# Patient Record
Sex: FEMALE | Race: WHITE | Employment: OTHER | URBAN - METROPOLITAN AREA
[De-identification: names, ages, dates, MRNs, and addresses within clinical notes are randomized per-mention and may not be internally consistent; named-entity substitution may affect disease eponyms.]

---

## 2017-01-06 ENCOUNTER — TELEPHONE (OUTPATIENT)
Dept: FAMILY MEDICINE CLINIC | Age: 77
End: 2017-01-06

## 2017-01-06 ENCOUNTER — OFFICE VISIT (OUTPATIENT)
Dept: FAMILY MEDICINE CLINIC | Age: 77
End: 2017-01-06

## 2017-01-06 VITALS
RESPIRATION RATE: 16 BRPM | HEIGHT: 66 IN | SYSTOLIC BLOOD PRESSURE: 124 MMHG | TEMPERATURE: 98 F | BODY MASS INDEX: 26.68 KG/M2 | DIASTOLIC BLOOD PRESSURE: 70 MMHG | WEIGHT: 166 LBS

## 2017-01-06 DIAGNOSIS — J01.90 ACUTE SINUSITIS, RECURRENCE NOT SPECIFIED, UNSPECIFIED LOCATION: ICD-10-CM

## 2017-01-06 DIAGNOSIS — J04.0 LARYNGITIS: ICD-10-CM

## 2017-01-06 DIAGNOSIS — E11.9 TYPE 2 DIABETES MELLITUS WITHOUT COMPLICATION, WITHOUT LONG-TERM CURRENT USE OF INSULIN (HCC): ICD-10-CM

## 2017-01-06 DIAGNOSIS — M17.12 PRIMARY OSTEOARTHRITIS OF LEFT KNEE: ICD-10-CM

## 2017-01-06 DIAGNOSIS — E11.9 TYPE 2 DIABETES MELLITUS WITHOUT COMPLICATION, WITHOUT LONG-TERM CURRENT USE OF INSULIN (HCC): Primary | ICD-10-CM

## 2017-01-06 LAB
ANION GAP SERPL CALCULATED.3IONS-SCNC: 18 MMOL/L (ref 3–16)
BUN BLDV-MCNC: 26 MG/DL (ref 7–20)
CALCIUM SERPL-MCNC: 10.2 MG/DL (ref 8.3–10.6)
CHLORIDE BLD-SCNC: 101 MMOL/L (ref 99–110)
CO2: 26 MMOL/L (ref 21–32)
CREAT SERPL-MCNC: 0.9 MG/DL (ref 0.6–1.2)
GFR AFRICAN AMERICAN: >60
GFR NON-AFRICAN AMERICAN: >60
GLUCOSE BLD-MCNC: 128 MG/DL (ref 70–99)
POTASSIUM SERPL-SCNC: 4.3 MMOL/L (ref 3.5–5.1)
SODIUM BLD-SCNC: 145 MMOL/L (ref 136–145)

## 2017-01-06 PROCEDURE — 99214 OFFICE O/P EST MOD 30 MIN: CPT | Performed by: FAMILY MEDICINE

## 2017-01-06 RX ORDER — PREDNISONE 20 MG/1
20 TABLET ORAL 2 TIMES DAILY
Qty: 10 TABLET | Refills: 0 | Status: SHIPPED | OUTPATIENT
Start: 2017-01-06 | End: 2017-01-11

## 2017-01-06 RX ORDER — AZITHROMYCIN 250 MG/1
TABLET, FILM COATED ORAL
Qty: 1 PACKET | Refills: 0 | Status: SHIPPED | OUTPATIENT
Start: 2017-01-06 | End: 2017-01-11

## 2017-01-07 LAB
ESTIMATED AVERAGE GLUCOSE: 151.3 MG/DL
HBA1C MFR BLD: 6.9 %

## 2017-01-11 RX ORDER — ATORVASTATIN CALCIUM 80 MG/1
80 TABLET, FILM COATED ORAL DAILY
Qty: 90 TABLET | Refills: 1 | Status: SHIPPED | OUTPATIENT
Start: 2017-01-11 | End: 2017-07-15 | Stop reason: SDUPTHER

## 2017-01-20 RX ORDER — METFORMIN HYDROCHLORIDE 500 MG/1
500 TABLET, EXTENDED RELEASE ORAL 2 TIMES DAILY
Qty: 180 TABLET | Refills: 0 | Status: SHIPPED | OUTPATIENT
Start: 2017-01-20 | End: 2017-04-14 | Stop reason: SDUPTHER

## 2017-02-22 ENCOUNTER — OFFICE VISIT (OUTPATIENT)
Dept: ENT CLINIC | Age: 77
End: 2017-02-22

## 2017-02-22 DIAGNOSIS — H91.90 HEARING LOSS, UNSPECIFIED LATERALITY: Primary | ICD-10-CM

## 2017-03-27 ENCOUNTER — PATIENT MESSAGE (OUTPATIENT)
Dept: FAMILY MEDICINE CLINIC | Age: 77
End: 2017-03-27

## 2017-03-28 ENCOUNTER — PATIENT MESSAGE (OUTPATIENT)
Dept: FAMILY MEDICINE CLINIC | Age: 77
End: 2017-03-28

## 2017-03-28 DIAGNOSIS — N39.3 STRESS INCONTINENCE IN FEMALE: ICD-10-CM

## 2017-03-28 RX ORDER — SOLIFENACIN SUCCINATE 10 MG/1
10 TABLET, FILM COATED ORAL DAILY
Qty: 90 TABLET | Refills: 3 | Status: SHIPPED | OUTPATIENT
Start: 2017-03-28 | End: 2017-07-15 | Stop reason: SDUPTHER

## 2017-04-05 ENCOUNTER — HOSPITAL ENCOUNTER (OUTPATIENT)
Dept: WOMENS IMAGING | Age: 77
Discharge: HOME | End: 2017-04-05
Attending: FAMILY MEDICINE | Admitting: FAMILY MEDICINE

## 2017-04-05 DIAGNOSIS — Z12.31 VISIT FOR SCREENING MAMMOGRAM: ICD-10-CM

## 2017-04-10 DIAGNOSIS — R92.8 ABNORMAL MAMMOGRAM OF LEFT BREAST: Primary | ICD-10-CM

## 2017-04-11 ENCOUNTER — HOSPITAL ENCOUNTER (OUTPATIENT)
Dept: ULTRASOUND IMAGING | Age: 77
Discharge: HOME | End: 2017-04-11
Attending: FAMILY MEDICINE | Admitting: FAMILY MEDICINE

## 2017-04-11 DIAGNOSIS — R92.8 OTHER ABNORMAL AND INCONCLUSIVE FINDINGS ON DIAGNOSTIC IMAGING OF BREAST: ICD-10-CM

## 2017-04-11 DIAGNOSIS — R92.8 ABNORMAL MAMMOGRAM: ICD-10-CM

## 2017-04-15 RX ORDER — METFORMIN HYDROCHLORIDE 500 MG/1
TABLET, EXTENDED RELEASE ORAL
Qty: 180 TABLET | Refills: 0 | Status: SHIPPED | OUTPATIENT
Start: 2017-04-15 | End: 2017-08-03 | Stop reason: SDUPTHER

## 2017-04-19 ENCOUNTER — OFFICE VISIT (OUTPATIENT)
Dept: PULMONOLOGY | Age: 77
End: 2017-04-19

## 2017-04-19 VITALS
TEMPERATURE: 98.3 F | SYSTOLIC BLOOD PRESSURE: 120 MMHG | HEART RATE: 63 BPM | BODY MASS INDEX: 26.52 KG/M2 | OXYGEN SATURATION: 97 % | WEIGHT: 165 LBS | HEIGHT: 66 IN | DIASTOLIC BLOOD PRESSURE: 70 MMHG | RESPIRATION RATE: 16 BRPM

## 2017-04-19 DIAGNOSIS — Z99.89 OSA ON CPAP: Primary | ICD-10-CM

## 2017-04-19 DIAGNOSIS — G47.33 OSA ON CPAP: Primary | ICD-10-CM

## 2017-04-19 PROCEDURE — 99214 OFFICE O/P EST MOD 30 MIN: CPT | Performed by: INTERNAL MEDICINE

## 2017-04-19 ASSESSMENT — SLEEP AND FATIGUE QUESTIONNAIRES
ESS TOTAL SCORE: 4
HOW LIKELY ARE YOU TO NOD OFF OR FALL ASLEEP WHILE SITTING INACTIVE IN A PUBLIC PLACE: 0
HOW LIKELY ARE YOU TO NOD OFF OR FALL ASLEEP WHILE WATCHING TV: 1
HOW LIKELY ARE YOU TO NOD OFF OR FALL ASLEEP IN A CAR, WHILE STOPPED FOR A FEW MINUTES IN TRAFFIC: 0
HOW LIKELY ARE YOU TO NOD OFF OR FALL ASLEEP WHILE LYING DOWN TO REST IN THE AFTERNOON WHEN CIRCUMSTANCES PERMIT: 1
HOW LIKELY ARE YOU TO NOD OFF OR FALL ASLEEP WHILE SITTING AND READING: 2
HOW LIKELY ARE YOU TO NOD OFF OR FALL ASLEEP WHILE SITTING AND TALKING TO SOMEONE: 0
HOW LIKELY ARE YOU TO NOD OFF OR FALL ASLEEP WHILE SITTING QUIETLY AFTER LUNCH WITHOUT ALCOHOL: 0
HOW LIKELY ARE YOU TO NOD OFF OR FALL ASLEEP WHEN YOU ARE A PASSENGER IN A CAR FOR AN HOUR WITHOUT A BREAK: 0

## 2017-05-17 ENCOUNTER — OFFICE VISIT (OUTPATIENT)
Dept: FAMILY MEDICINE CLINIC | Age: 77
End: 2017-05-17

## 2017-05-17 VITALS
HEART RATE: 54 BPM | DIASTOLIC BLOOD PRESSURE: 70 MMHG | BODY MASS INDEX: 26.36 KG/M2 | HEIGHT: 66 IN | TEMPERATURE: 98.6 F | WEIGHT: 164 LBS | SYSTOLIC BLOOD PRESSURE: 120 MMHG | RESPIRATION RATE: 16 BRPM

## 2017-05-17 DIAGNOSIS — N30.00 ACUTE CYSTITIS WITHOUT HEMATURIA: ICD-10-CM

## 2017-05-17 DIAGNOSIS — M17.12 PRIMARY OSTEOARTHRITIS OF LEFT KNEE: Primary | ICD-10-CM

## 2017-05-17 DIAGNOSIS — E11.9 TYPE 2 DIABETES MELLITUS WITHOUT COMPLICATION, WITHOUT LONG-TERM CURRENT USE OF INSULIN (HCC): ICD-10-CM

## 2017-05-17 LAB
BILIRUBIN, POC: NEGATIVE
BLOOD URINE, POC: NEGATIVE
CLARITY, POC: CLEAR
COLOR, POC: YELLOW
GLUCOSE URINE, POC: NEGATIVE
HBA1C MFR BLD: 6.4 %
KETONES, POC: ABNORMAL
LEUKOCYTE EST, POC: ABNORMAL
NITRITE, POC: NEGATIVE
PH, POC: 5.5
PROTEIN, POC: NEGATIVE
SPECIFIC GRAVITY, POC: 1.02
UROBILINOGEN, POC: ABNORMAL

## 2017-05-17 PROCEDURE — 81002 URINALYSIS NONAUTO W/O SCOPE: CPT | Performed by: FAMILY MEDICINE

## 2017-05-17 PROCEDURE — 99214 OFFICE O/P EST MOD 30 MIN: CPT | Performed by: FAMILY MEDICINE

## 2017-05-17 PROCEDURE — 83036 HEMOGLOBIN GLYCOSYLATED A1C: CPT | Performed by: FAMILY MEDICINE

## 2017-05-17 RX ORDER — SULFAMETHOXAZOLE AND TRIMETHOPRIM 800; 160 MG/1; MG/1
1 TABLET ORAL 2 TIMES DAILY
Qty: 10 TABLET | Refills: 0 | Status: SHIPPED | OUTPATIENT
Start: 2017-05-17 | End: 2017-05-22

## 2017-05-20 LAB
ORGANISM: ABNORMAL
URINE CULTURE, ROUTINE: ABNORMAL
URINE CULTURE, ROUTINE: ABNORMAL

## 2017-05-22 DIAGNOSIS — N30.00 ACUTE CYSTITIS WITHOUT HEMATURIA: Primary | ICD-10-CM

## 2017-05-22 RX ORDER — CIPROFLOXACIN 500 MG/1
500 TABLET, FILM COATED ORAL 2 TIMES DAILY
Qty: 20 TABLET | Refills: 0 | Status: SHIPPED | OUTPATIENT
Start: 2017-05-22 | End: 2017-06-01

## 2017-07-15 DIAGNOSIS — N39.3 STRESS INCONTINENCE IN FEMALE: ICD-10-CM

## 2017-07-15 RX ORDER — ATORVASTATIN CALCIUM 80 MG/1
80 TABLET, FILM COATED ORAL DAILY
Qty: 90 TABLET | Refills: 1 | Status: SHIPPED | OUTPATIENT
Start: 2017-07-15 | End: 2018-01-09 | Stop reason: SDUPTHER

## 2017-07-15 RX ORDER — SOLIFENACIN SUCCINATE 10 MG/1
10 TABLET, FILM COATED ORAL DAILY
Qty: 90 TABLET | Refills: 3 | Status: SHIPPED | OUTPATIENT
Start: 2017-07-15 | End: 2018-01-09 | Stop reason: SDUPTHER

## 2017-08-03 RX ORDER — METFORMIN HYDROCHLORIDE 500 MG/1
500 TABLET, EXTENDED RELEASE ORAL 2 TIMES DAILY
Qty: 180 TABLET | Refills: 0 | Status: SHIPPED | OUTPATIENT
Start: 2017-08-03 | End: 2017-10-30 | Stop reason: SDUPTHER

## 2017-08-23 ENCOUNTER — OFFICE VISIT (OUTPATIENT)
Dept: FAMILY MEDICINE CLINIC | Age: 77
End: 2017-08-23

## 2017-08-23 VITALS
TEMPERATURE: 98.2 F | BODY MASS INDEX: 25.07 KG/M2 | RESPIRATION RATE: 16 BRPM | SYSTOLIC BLOOD PRESSURE: 124 MMHG | HEART RATE: 68 BPM | WEIGHT: 156 LBS | HEIGHT: 66 IN | DIASTOLIC BLOOD PRESSURE: 78 MMHG

## 2017-08-23 DIAGNOSIS — E11.9 TYPE 2 DIABETES MELLITUS WITHOUT COMPLICATION, WITHOUT LONG-TERM CURRENT USE OF INSULIN (HCC): Primary | ICD-10-CM

## 2017-08-23 DIAGNOSIS — E78.2 MIXED HYPERLIPIDEMIA: ICD-10-CM

## 2017-08-23 LAB — HBA1C MFR BLD: 6.8 %

## 2017-08-23 PROCEDURE — 83036 HEMOGLOBIN GLYCOSYLATED A1C: CPT | Performed by: FAMILY MEDICINE

## 2017-08-23 PROCEDURE — 99213 OFFICE O/P EST LOW 20 MIN: CPT | Performed by: FAMILY MEDICINE

## 2017-08-23 ASSESSMENT — PATIENT HEALTH QUESTIONNAIRE - PHQ9
SUM OF ALL RESPONSES TO PHQ9 QUESTIONS 1 & 2: 0
1. LITTLE INTEREST OR PLEASURE IN DOING THINGS: 0
2. FEELING DOWN, DEPRESSED OR HOPELESS: 0
SUM OF ALL RESPONSES TO PHQ QUESTIONS 1-9: 0

## 2017-10-12 ENCOUNTER — TELEPHONE (OUTPATIENT)
Dept: PULMONOLOGY | Age: 77
End: 2017-10-12

## 2017-10-30 RX ORDER — METFORMIN HYDROCHLORIDE 500 MG/1
500 TABLET, EXTENDED RELEASE ORAL 2 TIMES DAILY
Qty: 180 TABLET | Refills: 0 | Status: SHIPPED | OUTPATIENT
Start: 2017-10-30 | End: 2018-01-29 | Stop reason: SDUPTHER

## 2017-10-30 NOTE — TELEPHONE ENCOUNTER
From: Lynn Abebe  Sent: 10/29/2017 1:26 PM EDT  Subject: Medication Renewal Request    [place] Anast[place].  Jose Byrd would like a refill of the following medications:  metFORMIN (GLUCOPHAGE-XR) 500 MG extended release tablet Ramirez Merlos MD]    Preferred pharmacy: [facility] [address], [address] [place] [address] [phone] - F [phone]    Comment:

## 2017-12-29 ENCOUNTER — OFFICE VISIT (OUTPATIENT)
Dept: FAMILY MEDICINE CLINIC | Age: 77
End: 2017-12-29

## 2017-12-29 VITALS
HEIGHT: 66 IN | RESPIRATION RATE: 16 BRPM | DIASTOLIC BLOOD PRESSURE: 70 MMHG | WEIGHT: 157 LBS | HEART RATE: 80 BPM | TEMPERATURE: 97.9 F | BODY MASS INDEX: 25.23 KG/M2 | SYSTOLIC BLOOD PRESSURE: 116 MMHG

## 2017-12-29 DIAGNOSIS — E78.2 MIXED HYPERLIPIDEMIA: ICD-10-CM

## 2017-12-29 DIAGNOSIS — Z00.00 MEDICARE ANNUAL WELLNESS VISIT, SUBSEQUENT: Primary | ICD-10-CM

## 2017-12-29 DIAGNOSIS — E11.9 TYPE 2 DIABETES MELLITUS WITHOUT COMPLICATION, WITHOUT LONG-TERM CURRENT USE OF INSULIN (HCC): ICD-10-CM

## 2017-12-29 DIAGNOSIS — M15.9 PRIMARY OSTEOARTHRITIS INVOLVING MULTIPLE JOINTS: ICD-10-CM

## 2017-12-29 LAB
A/G RATIO: 1.9 (ref 1.1–2.2)
ALBUMIN SERPL-MCNC: 4.2 G/DL (ref 3.4–5)
ALP BLD-CCNC: 112 U/L (ref 40–129)
ALT SERPL-CCNC: 22 U/L (ref 10–40)
ANION GAP SERPL CALCULATED.3IONS-SCNC: 13 MMOL/L (ref 3–16)
AST SERPL-CCNC: 18 U/L (ref 15–37)
BILIRUB SERPL-MCNC: 1.3 MG/DL (ref 0–1)
BUN BLDV-MCNC: 23 MG/DL (ref 7–20)
CALCIUM SERPL-MCNC: 9.4 MG/DL (ref 8.3–10.6)
CHLORIDE BLD-SCNC: 104 MMOL/L (ref 99–110)
CHOLESTEROL, TOTAL: 197 MG/DL (ref 0–199)
CO2: 26 MMOL/L (ref 21–32)
CREAT SERPL-MCNC: 0.7 MG/DL (ref 0.6–1.2)
GFR AFRICAN AMERICAN: >60
GFR NON-AFRICAN AMERICAN: >60
GLOBULIN: 2.2 G/DL
GLUCOSE BLD-MCNC: 156 MG/DL (ref 70–99)
HDLC SERPL-MCNC: 106 MG/DL (ref 40–60)
LDL CHOLESTEROL CALCULATED: 72 MG/DL
POTASSIUM SERPL-SCNC: 4.1 MMOL/L (ref 3.5–5.1)
SODIUM BLD-SCNC: 143 MMOL/L (ref 136–145)
TOTAL PROTEIN: 6.4 G/DL (ref 6.4–8.2)
TRIGL SERPL-MCNC: 93 MG/DL (ref 0–150)
VLDLC SERPL CALC-MCNC: 19 MG/DL

## 2017-12-29 PROCEDURE — G0439 PPPS, SUBSEQ VISIT: HCPCS | Performed by: FAMILY MEDICINE

## 2017-12-29 PROCEDURE — 99214 OFFICE O/P EST MOD 30 MIN: CPT | Performed by: FAMILY MEDICINE

## 2017-12-29 ASSESSMENT — ANXIETY QUESTIONNAIRES: GAD7 TOTAL SCORE: 0

## 2017-12-29 ASSESSMENT — LIFESTYLE VARIABLES
HOW MANY STANDARD DRINKS CONTAINING ALCOHOL DO YOU HAVE ON A TYPICAL DAY: 0
HAS A RELATIVE, FRIEND, DOCTOR, OR ANOTHER HEALTH PROFESSIONAL EXPRESSED CONCERN ABOUT YOUR DRINKING OR SUGGESTED YOU CUT DOWN: 0
HOW OFTEN DO YOU HAVE A DRINK CONTAINING ALCOHOL: 4
HOW OFTEN DO YOU HAVE SIX OR MORE DRINKS ON ONE OCCASION: 0
HOW OFTEN DURING THE LAST YEAR HAVE YOU NEEDED AN ALCOHOLIC DRINK FIRST THING IN THE MORNING TO GET YOURSELF GOING AFTER A NIGHT OF HEAVY DRINKING: 0
AUDIT TOTAL SCORE: 4
HOW OFTEN DURING THE LAST YEAR HAVE YOU FOUND THAT YOU WERE NOT ABLE TO STOP DRINKING ONCE YOU HAD STARTED: 0
HOW OFTEN DURING THE LAST YEAR HAVE YOU FAILED TO DO WHAT WAS NORMALLY EXPECTED FROM YOU BECAUSE OF DRINKING: 0
AUDIT-C TOTAL SCORE: 4
HOW OFTEN DURING THE LAST YEAR HAVE YOU BEEN UNABLE TO REMEMBER WHAT HAPPENED THE NIGHT BEFORE BECAUSE YOU HAD BEEN DRINKING: 0
HAVE YOU OR SOMEONE ELSE BEEN INJURED AS A RESULT OF YOUR DRINKING: 0
HOW OFTEN DURING THE LAST YEAR HAVE YOU HAD A FEELING OF GUILT OR REMORSE AFTER DRINKING: 0

## 2017-12-29 ASSESSMENT — PATIENT HEALTH QUESTIONNAIRE - PHQ9: SUM OF ALL RESPONSES TO PHQ QUESTIONS 1-9: 0

## 2017-12-29 NOTE — PROGRESS NOTES
Medicare Annual Wellness Visit  Name: Elizabeth Santo  YOB: 1940  Age: 65 y.o. Sex: female  MRN: [id number]     Date of Service:  12/29/2017    Patient Active Problem List   Diagnosis    Diabetes mellitus, type II ([address]    Hyperlipidemia    Insomnia    Osteopenia- DXA nl 6/12    Ganglion of joint    Stress incontinence in female    Osteoarthritis    Allergic rhinitis    Spondylosis    Sleep apnea-CPAP    Colon cancer screening- neg colon 2/2010    Screening mammogram, encounter for    Screening for cardiovascular condition- 5/11 mild left carotid art dis, nl TOPHER    Elbow pain, left    Hip pain, right    Lumbar disc disease with L 5 radiculopathy    Retrolisthesis L5-S1    GERD (gastroesophageal reflux disease)    Medicare annual wellness visit, subsequent    Hematoma dorsal right foot 8/2013    Neuropathy right ankle S/P surgery    Tear of meniscus of knee    Chondromalacia patella, left    Nephrolithiasis    Lumbosacral radiculopathy at L5 right    S/P lumbar spinal fusion    Actinic dermatitis    Primary osteoarthritis of left knee     Health Maintenance   Topic Date Due    Diabetic foot exam  09/22/2017    Lipid screen  09/22/2017    Diabetic hemoglobin A1C test  02/23/2018    Diabetic retinal exam  12/21/2018    DTaP/Tdap/Td vaccine (3 - Td) 06/11/2026    Zostavax vaccine  Addressed    DEXA (modify frequency per FRAX score)  Completed    Flu vaccine  Completed    Pneumococcal low/med risk  Completed      Chief Complaint:   Elizabeth Santo is a 65 y.o. female who presents for Medicare Annual Wellness Visit and check-up for:       Type 2 diabetes mellitus without complication, without long-term current use of insulin (HCC)     Mixed hyperlipidemia     Primary osteoarthritis involving multiple joints      HPI  Had spine injections 2 weeks ago, ?  Help but going [address] ROS: negative for - fever or night sweats  Ophthalmic ROS: negative for - eye pain or loss of vision  ENT ROS: negative for - hearing change or sore throat  Hematological and Lymphatic ROS: negative for - bruising or weight loss  Endocrine ROS: negative for - malaise/lethargy or unexpected weight changes  Respiratory ROS: no cough, shortness of breath, or wheezing  Cardiovascular ROS: no chest pain or dyspnea on exertion  Gastrointestinal ROS: no abdominal pain, change in bowel habits, or black or bloody stools  Genito-Urinary ROS: no dysuria, trouble voiding, or hematuria  Dermatological ROS: negative for - rash or skin lesion changes    Screenings for behavioral, psychosocial and functional/safety risks, and cognitive dysfunction are all negative except as indicated below. These results, as well as other patient data from the [address] form, are documented in Flowsheets linked to this Encounter. Allergies   Allergen Reactions    Simvastatin       Prior to Visit Medications    Medication Sig Taking? Authorizing Provider   metFORMIN (GLUCOPHAGE-XR) 500 MG extended release tablet Take 1 tablet by mouth 2 times daily Yes Lily Reagan MD   atorvastatin (LIPITOR) 80 MG tablet Take 1 tablet by mouth daily Yes Valarie Cleary MD   solifenacin (VESICARE) 10 MG tablet Take 1 tablet by mouth daily Yes Valarie Cleary MD   naproxen sodium (ALEVE) 220 MG tablet Take 550 mg by mouth 2 times daily (with meals) Yes Historical Provider, MD   CPAP Machine MISC by Does not apply route Yes Historical Provider, MD   glucose blood VI test strips (ONE TOUCH ULTRA TEST) strip Daily. DM 2   250.00 Yes Valarie Cleary MD   aspirin 81 MG EC tablet Take 81 mg by mouth daily. Yes Historical Provider, MD   solifenacin (VESICARE) 10 MG tablet Take 1 tablet by mouth daily.   Valarie Cleary MD     Past Medical History:   Diagnosis Date    Cataract     Chronic kidney disease     Chronic SI joint pain     Complication of anesthesia     oversedation    Depression · Home safety tips provided  Depression Interventions:  · None indicated  Anxiety Interventions:  · None indicated  Cognitive Impairment Interventions:  · None indicated     Substance Abuse Interventions:  · None indicated  Social History     Social History    Marital status:      Spouse name: Taj Fishman Number of children: N/A    Years of education: N/A     Social History Main Topics    Smoking status: Never Smoker    Smokeless tobacco: Never Used      Comment: Advised not to start.  Alcohol use 0.0 oz/week      Comment: light    Drug use: No    Sexual activity: Yes     Partners: Male      Comment:      Other Topics Concern    None     Social History Narrative    Self-breast exams: yes. Exercise: [employer] sometimes, walk sometimes. Seatbelt use: Always. Living will: yes, copy requested.        Health Risk Assessment:   Please see Screenings under MA note  General Health Risk Interventions:  · None indicated     Wt Readings from Last 5 Encounters:   12/29/17 157 lb (71.2 kg)   08/23/17 156 lb (70.8 kg)   05/17/17 164 lb (74.4 kg)   04/19/17 165 lb (74.8 kg)   01/06/17 166 lb (75.3 kg)      Body mass index is 25.34 kg/m².    Health Habits/Nutrition Interventions:  · Inadequate physical activity:  patient agrees to exercise for at least 150 minutes/week     Hearing/Vision Interventions:  · Hearing concerns:  new hearing aides on way     Safety Interventions:  · Home safety tips provided     ADL Interventions:  · None indicated    Personalized Preventive Plan   Current Health Maintenance Status  Immunization History   Administered Date(s) Administered    Influenza Virus Vaccine 01/28/2010    Influenza, High Dose 10/28/2014, 10/19/2015, 08/22/2016, 10/30/2017    Pneumococcal 13-valent Conjugate ([id number]) 10/19/2015    Pneumococcal Conjugate 7-valent 09/13/2007    Pneumococcal Polysaccharide ([id number]) 09/13/2007    Tdap (Boostrix, Adacel) 11/17/2005, 06/11/2016     Health Maintenance   Topic Date Due    Diabetic foot exam  09/22/2017    Lipid screen  09/22/2017    Diabetic hemoglobin A1C test  02/23/2018    Diabetic retinal exam  12/21/2018    DTaP/Tdap/Td vaccine (3 - Td) 06/11/2026    Zostavax vaccine  Addressed    DEXA (modify frequency per FRAX score)  Completed    Flu vaccine  Completed    Pneumococcal low/med risk  Completed     Recommendations for Preventive Services Due: see orders.   Recommended screening schedule for the next 5-10 years is provided to the patient in written form: see Patient Instructions/AVS.    LAST LABS   Lab Results   Component Value Date    GLUCOSE 128 (H) 01/06/2017     Lab Results   Component Value Date     01/06/2017    K 4.3 01/06/2017    CREATININE 0.9 01/06/2017     Cholesterol, Total   Date Value Ref Range Status   09/22/2016 193 0 - 199 mg/dL Final     LDL Calculated   Date Value Ref Range Status   09/22/2016 83 <100 mg/dL Final     HDL   Date Value Ref Range Status   09/22/2016 96 (H) 40 - 60 mg/dL Final   10/19/2011 72 (H) 40 - 60 mg/dl Final     Triglycerides   Date Value Ref Range Status   09/22/2016 69 0 - 150 mg/dL Final     Lab Results   Component Value Date    ALT 9 (L) 09/22/2016    AST 12 (L) 09/22/2016    ALKPHOS 102 09/22/2016    BILITOT 0.7 09/22/2016      Lab Results   Component Value Date    WBC 5.7 10/16/2015    HGB 12.0 10/16/2015    HCT 36.3 10/16/2015    MCV 92.7 10/16/2015     10/16/2015     TSH (uIU/ml)   Date Value   01/30/2012 1.13     Lab Results   Component Value Date    LABA1C 6.8 08/23/2017     The 10-year ASCVD risk score (Anny Calero et al., 2013) is: 34.7%    Values used to calculate the score:      Age: 65 years      Sex: Female      Is Non- : No      Diabetic: Yes      Tobacco smoker: No      Systolic Blood Pressure: 049 mmHg      Is BP treated: No      HDL Cholesterol: 96 mg/dL      Total Cholesterol: 193 mg/dL  PHYSICAL EXAM:  VITALS:  Blood pressure is Excellent. BP Readings from Last 5 Encounters:   12/29/17 116/70   08/23/17 124/78   05/17/17 120/70   04/19/17 120/70   01/06/17 124/70     Weight is unchanged. Wt Readings from Last 5 Encounters:   12/29/17 157 lb (71.2 kg)   08/23/17 156 lb (70.8 kg)   05/17/17 164 lb (74.4 kg)   04/19/17 165 lb (74.8 kg)   01/06/17 166 lb (75.3 kg)      Vitals:    12/29/17 0924   BP: 116/70   Site: Right Arm   Position: Sitting   Cuff Size: Large Adult   Pulse: 80   Resp: 16   Temp: 97.9 °F (36.6 °C)   TempSrc: Oral   Weight: 157 lb (71.2 kg)   Height: 5' 6\" (1.676 m)     Body mass index is 25.34 kg/m². GENERAL: well-developed, well-nourished, alert, no distress, calm   EYES: negative findings: lids and lashes normal and conjunctivae and sclerae normal  ENT: normal TM's and external ear canals both ears  · External nose and ears appear normal  · Pharynx: normal. Exudates: None  · Lips, mucosa, and tongue normal and teeth normal  · Hearing grossly normal.     NECK: No adenopathy, supple, symmetrical, trachea midline  · Thyroid not enlarged, symmetric, no tenderness/mass/nodules  · no cervical nodes, no supraclavicular nodes  LUNGS:  Breathing unlabored  · clear to auscultation bilaterally and good air movement  CARDIOVASC: regular rate and rhythm, S1, S2 normal, no murmur, click, rub or gallop  LEGS:  Lower extremity edema: none    · No carotid bruits  ABDOMEN: Soft, non-tender, no masses  · No hepatosplenomegaly  · No hernias noted. Exam limited by N/A  SKIN: warm and dry  · No rashes or suspicious lesions  PSYCH:  Alert and oriented  · Normal reasoning, insight good  · Facial expressions full, mood appropriate  · No memory disturbance noted  MUSCULOSKEL:  No significant finger or nail findings  · Spine symmetric, no deformities, no kyphosis   GAIT: UP and Go test: <30 seconds with gait: normal.  Speed Normal.  No significant balance checks. No extra steps on turn around.  Assistive device: none      Assessment and Plan: 1. Medicare annual wellness visit, subsequent     2. Type 2 diabetes mellitus without complication, without long-term current use of insulin (HCC)  well controlled, no significant medication side effects noted. Medication: no change. A1c today. Hemoglobin A1C    Comprehensive Metabolic Panel     DIABETES FOOT EXAM   3. Mixed hyperlipidemia  Last test showed control to be well controlled. No significant medication side effects noted. Continue current therapy. Labs as below. Lipid Panel    Comprehensive Metabolic Panel   4. Primary osteoarthritis involving multiple joints  Stable with current medications. Diagnosis Risk: Moderate Risk    FYI: While Medicare provides you with a FREE ANNUAL PREVENTIVE PHYSICAL, this visit does NOT include management of chronic medical problems or physical examination. Dr. Yoel Webber usually does a combination visit if you have other medical problems so you don't have to come back for another visit. However, this means that there will be a co-pay. INSTRUCTIONS  NEXT APPOINTMENT: Please schedule check-up in 3 months. PLEASE GET BLOODWORK DRAWN TODAY ON FIRST FLOOR in 170. Take orders with you. RESULTS- most blood tests back in couple days. We will call you if any problems. If bloodwork good, you will get letter in mail or notified thru [address] (if signed up) within 2 weeks. If you do not, please call office.      Personalized Preventive Plan  Health Maintenance Due   Topic Date Due    Diabetic foot exam  09/22/2017    Lipid screen  09/22/2017     Other Preventive Recommendations:  · A preventive eye exam performed by an eye specialist is recommended every 1-2 years to screen for glaucoma; cataracts, macular degeneration, and other eye disorders  · A preventive dental visit is recommended every 6 months  · Try to get at least 150 minutes of exercise per week or 10,000 steps per day on a pedometer  · Order FREE \"Exercise and Physical Activity\" book from the [facility]

## 2017-12-29 NOTE — PATIENT INSTRUCTIONS
FYI: While Medicare provides you with a FREE ANNUAL PREVENTIVE PHYSICAL, this visit does NOT include management of chronic medical problems or physical examination. Dr. Jillian Tran usually does a combination visit if you have other medical problems so you don't have to come back for another visit. However, this means that there will be a co-pay. INSTRUCTIONS  NEXT APPOINTMENT: Please schedule check-up in 3 months. PLEASE GET BLOODWORK DRAWN TODAY ON FIRST FLOOR in 170. Take orders with you. RESULTS- most blood tests back in couple days. We will call you if any problems. If bloodwork good, you will get letter in mail or notified thru [address] (if signed up) within 2 weeks. If you do not, please call office.      Patient Education       Personalized Preventive Plan  Health Maintenance Due   Topic Date Due    Diabetic foot exam  2017    Lipid screen  2017     Other Preventive Recommendations:  · A preventive eye exam performed by an eye specialist is recommended every 1-2 years to screen for glaucoma; cataracts, macular degeneration, and other eye disorders  · A preventive dental visit is recommended every 6 months  · Try to get at least 150 minutes of exercise per week or 10,000 steps per day on a pedometer  · Order FREE \"Exercise and Physical Activity\" book from the [facility] on Agin1-709.103.4421 or https://order.julisa.nih.gov/health/publication/order/  · You need 8936-6680 mg of calcium and 6951-4665 IU of vitamin D per day- it is possible to meet your calcium requirement with diet alone, but a vitamin D supplement is usually necessary to meet this goal  · When exposed to the sun, use a sunscreen that protects against both UVA and UVB radiation with an SPF of 30 or greater- reapply every 2 to 3 hours or after sweating, drying off with a towel, or swimming  · Always wear a seat belt when traveling in a car, and a helmet when riding a bicycle or motorcycle    EXERCISE AND again.    When should I call my doctor? If your muscles or joints are sore the day after exercising, you may have done too much. Next time, exercise at a lower intensity. If the pain or discomfort persists, you should talk to your doctor. You should also talk to your doctor if you have any of the following symptoms while exercising:  Chest pain or pressure   Trouble breathing or excessive shortness of breath   Light-headedness or dizziness   Difficulty with balance   Nausea    What are some specific exercises I can do? The following page shows some simple strength exercises that you can do at home. Each exercise should be done 8 to 10 times for 2 sets. Remember to:  Complete all movements in a slow, controlled fashion. Don't hold your breath. Stop if you feel pain. Stretch each muscle after your workout. Disclaimer: The drawings and animations of exercises displayed below are provided only to illustrate the exercises     Wall Pushups  Place hands flat against the wall. Slowly lower body to the wall. Push body away from wall to return to starting position. Chair Squats  Begin by sitting in the chair. Lean slightly forward and stand up from the chair. Try not to favor one side or use your hands to help you. Bicep Curl  Hold a weight in each hand with your arms at your sides. Bending your arms at the elbows, lift the weights to your shoulders and then lower them to your sides. Shoulder Shrugs  Hold a weight in each hand with your arms at your side. Shrug your shoulders up toward your ears and then lower them back down. FALLS:  HOW TO LOWER YOUR RISK     Who is at high risk of falling? Anyone can fall, although the risk is higher in older people. This increased risk of falling may be the result of changes that come with aging, and certain medical conditions, such as arthritis, cataracts or hip problems. What can I do to lower my risk of falling?   Most falls happen in the of the bed for a few minutes before standing up. Your blood pressure takes some time to adjust when you sit up. It may be too low if you get up quickly. This can make you dizzy, and you might lose your balance and fall. Home Safety: How Well Does Your Home Meet Your Needs? Steps/Stairways/Walkways YesNo    Are they in good shape? Do they have a smooth, safe surface? Are there handrails on both sides of the stairway? How about light switches at the top and bottom of the stairs? Is there grasping space for both knuckles and fingers on railings? Are the stair treads deep enough for your whole foot? Would a ramp be feasible in any of these areas if it became necessary? Floor Surfaces YesNo    Is the surface safe? [place]? Any throw rugs or doormats that might slip underfoot? Is carpeting loose or torn? Are there changes in floor levels? If so, are they obvious or well marked? Do you have to step over any electric, telephone, or extension cords? Carolyne and Beth Quezada    Is there always space to park? Is it convenient to the entrance? Does the garage door open automatically? Windows & Doors [place]    Are windows and doors easy to open and close? Are locks sturdy and easy to operate? Do doorways accommodate a walker or wheelchair? Can you walk through the doorways easily? Is there space to maneuver while opening and closing doors? Does the front door have a view panel or peephole at the right height? Appliances/Kitchen/Bath [place]    Is the room arranged safely and conveniently? Do the oven and fridge open easily? Are stove controls clearly marked and easy to use? Is the counter the right height and depth? Can you work sitting down? Are cabinet doorknobs easy to use? Are faucets easy to use? Do you have a hand-held shower head? Are the items you use often on high shelves? Do you have a step stool with handles?     Can you

## 2017-12-30 LAB
ESTIMATED AVERAGE GLUCOSE: 180 MG/DL
HBA1C MFR BLD: 7.9 %

## 2018-01-09 DIAGNOSIS — N39.3 STRESS INCONTINENCE IN FEMALE: ICD-10-CM

## 2018-01-10 RX ORDER — ATORVASTATIN CALCIUM 80 MG/1
80 TABLET, FILM COATED ORAL DAILY
Qty: 90 TABLET | Refills: 1 | Status: SHIPPED | OUTPATIENT
Start: 2018-01-10 | End: 2018-07-16 | Stop reason: SDUPTHER

## 2018-01-10 RX ORDER — SOLIFENACIN SUCCINATE 10 MG/1
10 TABLET, FILM COATED ORAL DAILY
Qty: 90 TABLET | Refills: 3 | Status: SHIPPED | OUTPATIENT
Start: 2018-01-10 | End: 2018-12-10

## 2018-01-29 RX ORDER — METFORMIN HYDROCHLORIDE 500 MG/1
500 TABLET, EXTENDED RELEASE ORAL 2 TIMES DAILY
Qty: 180 TABLET | Refills: 0 | Status: SHIPPED | OUTPATIENT
Start: 2018-01-29 | End: 2018-05-07 | Stop reason: SDUPTHER

## 2018-01-29 NOTE — TELEPHONE ENCOUNTER
From: Fransisco Martin  Sent: 1/29/2018 10:13 AM EST  Subject: Medication Renewal Request    Cheri Fine.  Phyllis Prasad would like a refill of the following medications:  metFORMIN (GLUCOPHAGE-XR) 500 MG extended release tablet Rogelio Reagan MD]    Preferred pharmacy: [facility] [address], [address] [place] [address] -  [phone]    Comment:

## 2018-03-05 ENCOUNTER — PATIENT MESSAGE (OUTPATIENT)
Dept: FAMILY MEDICINE CLINIC | Age: 78
End: 2018-03-05

## 2018-03-13 ENCOUNTER — TELEPHONE (OUTPATIENT)
Dept: FAMILY MEDICINE CLINIC | Age: 78
End: 2018-03-13

## 2018-03-13 DIAGNOSIS — L82.0 INFLAMED SEBORRHEIC KERATOSIS: ICD-10-CM

## 2018-03-13 DIAGNOSIS — D23.5 DYSPLASTIC NEVUS OF TRUNK: Primary | ICD-10-CM

## 2018-03-14 PROBLEM — L82.0 INFLAMED SEBORRHEIC KERATOSIS: Status: ACTIVE | Noted: 2018-03-14

## 2018-03-14 PROBLEM — D23.5 DYSPLASTIC NEVUS OF TRUNK: Status: ACTIVE | Noted: 2018-03-14

## 2018-05-03 ENCOUNTER — TELEPHONE (OUTPATIENT)
Dept: FAMILY MEDICINE CLINIC | Age: 78
End: 2018-05-03

## 2018-05-03 DIAGNOSIS — E11.9 TYPE 2 DIABETES MELLITUS WITHOUT COMPLICATION, WITHOUT LONG-TERM CURRENT USE OF INSULIN (HCC): Primary | ICD-10-CM

## 2018-05-03 RX ORDER — MELOXICAM 15 MG/1
15 TABLET ORAL DAILY
COMMUNITY
Start: 2018-04-11 | End: 2018-08-22 | Stop reason: ALTCHOICE

## 2018-05-03 RX ORDER — FERROUS SULFATE 325(65) MG
325 TABLET ORAL
COMMUNITY
End: 2019-03-07

## 2018-05-04 ENCOUNTER — HOSPITAL ENCOUNTER (OUTPATIENT)
Dept: WOMENS IMAGING | Age: 78
Discharge: HOME | End: 2018-05-04
Attending: FAMILY MEDICINE | Admitting: FAMILY MEDICINE

## 2018-05-04 DIAGNOSIS — Z12.31 VISIT FOR SCREENING MAMMOGRAM: ICD-10-CM

## 2018-05-07 ENCOUNTER — OFFICE VISIT (OUTPATIENT)
Dept: FAMILY MEDICINE CLINIC | Age: 78
End: 2018-05-07

## 2018-05-07 VITALS
RESPIRATION RATE: 16 BRPM | HEIGHT: 66 IN | HEART RATE: 68 BPM | SYSTOLIC BLOOD PRESSURE: 120 MMHG | DIASTOLIC BLOOD PRESSURE: 70 MMHG | WEIGHT: 163 LBS | TEMPERATURE: 98.2 F | BODY MASS INDEX: 26.2 KG/M2

## 2018-05-07 DIAGNOSIS — N39.3 STRESS INCONTINENCE IN FEMALE: ICD-10-CM

## 2018-05-07 DIAGNOSIS — E11.9 TYPE 2 DIABETES MELLITUS WITHOUT COMPLICATION, WITHOUT LONG-TERM CURRENT USE OF INSULIN (HCC): Primary | ICD-10-CM

## 2018-05-07 DIAGNOSIS — E78.2 MIXED HYPERLIPIDEMIA: ICD-10-CM

## 2018-05-07 LAB — HBA1C MFR BLD: 7.2 %

## 2018-05-07 PROCEDURE — 83036 HEMOGLOBIN GLYCOSYLATED A1C: CPT | Performed by: FAMILY MEDICINE

## 2018-05-07 PROCEDURE — 99214 OFFICE O/P EST MOD 30 MIN: CPT | Performed by: FAMILY MEDICINE

## 2018-05-07 RX ORDER — METFORMIN HYDROCHLORIDE 500 MG/1
500 TABLET, EXTENDED RELEASE ORAL 2 TIMES DAILY
Qty: 180 TABLET | Refills: 0 | Status: SHIPPED | OUTPATIENT
Start: 2018-05-07 | End: 2018-05-07 | Stop reason: SDUPTHER

## 2018-05-07 RX ORDER — METFORMIN HYDROCHLORIDE 500 MG/1
500 TABLET, EXTENDED RELEASE ORAL 2 TIMES DAILY
Qty: 180 TABLET | Refills: 0 | Status: SHIPPED | OUTPATIENT
Start: 2018-05-07 | End: 2018-07-16 | Stop reason: SDUPTHER

## 2018-07-16 DIAGNOSIS — E11.9 TYPE 2 DIABETES MELLITUS WITHOUT COMPLICATION, WITHOUT LONG-TERM CURRENT USE OF INSULIN (HCC): ICD-10-CM

## 2018-07-17 RX ORDER — ATORVASTATIN CALCIUM 80 MG/1
80 TABLET, FILM COATED ORAL DAILY
Qty: 90 TABLET | Refills: 1 | Status: SHIPPED | OUTPATIENT
Start: 2018-07-17 | End: 2019-01-08 | Stop reason: SDUPTHER

## 2018-07-17 RX ORDER — METFORMIN HYDROCHLORIDE 500 MG/1
500 TABLET, EXTENDED RELEASE ORAL 2 TIMES DAILY
Qty: 180 TABLET | Refills: 0 | Status: SHIPPED | OUTPATIENT
Start: 2018-07-17 | End: 2018-11-01 | Stop reason: SDUPTHER

## 2018-08-22 ENCOUNTER — OFFICE VISIT (OUTPATIENT)
Dept: PULMONOLOGY | Age: 78
End: 2018-08-22

## 2018-08-22 VITALS
DIASTOLIC BLOOD PRESSURE: 68 MMHG | WEIGHT: 167 LBS | RESPIRATION RATE: 16 BRPM | HEART RATE: 74 BPM | TEMPERATURE: 98 F | HEIGHT: 66 IN | BODY MASS INDEX: 26.84 KG/M2 | SYSTOLIC BLOOD PRESSURE: 150 MMHG | OXYGEN SATURATION: 98 %

## 2018-08-22 DIAGNOSIS — G47.33 OSA ON CPAP: Primary | ICD-10-CM

## 2018-08-22 DIAGNOSIS — Z99.89 OSA ON CPAP: Primary | ICD-10-CM

## 2018-08-22 PROCEDURE — 99213 OFFICE O/P EST LOW 20 MIN: CPT | Performed by: INTERNAL MEDICINE

## 2018-08-22 ASSESSMENT — SLEEP AND FATIGUE QUESTIONNAIRES
HOW LIKELY ARE YOU TO NOD OFF OR FALL ASLEEP WHILE SITTING AND TALKING TO SOMEONE: 0
HOW LIKELY ARE YOU TO NOD OFF OR FALL ASLEEP WHILE LYING DOWN TO REST IN THE AFTERNOON WHEN CIRCUMSTANCES PERMIT: 1
ESS TOTAL SCORE: 4
HOW LIKELY ARE YOU TO NOD OFF OR FALL ASLEEP WHILE SITTING QUIETLY AFTER LUNCH WITHOUT ALCOHOL: 0
HOW LIKELY ARE YOU TO NOD OFF OR FALL ASLEEP WHEN YOU ARE A PASSENGER IN A CAR FOR AN HOUR WITHOUT A BREAK: 0
HOW LIKELY ARE YOU TO NOD OFF OR FALL ASLEEP WHILE SITTING AND READING: 2
HOW LIKELY ARE YOU TO NOD OFF OR FALL ASLEEP WHILE WATCHING TV: 1
HOW LIKELY ARE YOU TO NOD OFF OR FALL ASLEEP WHILE SITTING INACTIVE IN A PUBLIC PLACE: 0
HOW LIKELY ARE YOU TO NOD OFF OR FALL ASLEEP IN A CAR, WHILE STOPPED FOR A FEW MINUTES IN TRAFFIC: 0
NECK CIRCUMFERENCE (INCHES): 13.75

## 2018-08-24 ENCOUNTER — OFFICE VISIT (OUTPATIENT)
Dept: FAMILY MEDICINE CLINIC | Age: 78
End: 2018-08-24

## 2018-08-24 VITALS
SYSTOLIC BLOOD PRESSURE: 152 MMHG | BODY MASS INDEX: 26.52 KG/M2 | HEART RATE: 68 BPM | WEIGHT: 165 LBS | RESPIRATION RATE: 16 BRPM | TEMPERATURE: 98.1 F | DIASTOLIC BLOOD PRESSURE: 70 MMHG | HEIGHT: 66 IN

## 2018-08-24 DIAGNOSIS — R03.0 ELEVATED BP WITHOUT DIAGNOSIS OF HYPERTENSION: ICD-10-CM

## 2018-08-24 DIAGNOSIS — Z23 NEED FOR SHINGLES VACCINE: ICD-10-CM

## 2018-08-24 DIAGNOSIS — E11.9 TYPE 2 DIABETES MELLITUS WITHOUT COMPLICATION, WITHOUT LONG-TERM CURRENT USE OF INSULIN (HCC): ICD-10-CM

## 2018-08-24 DIAGNOSIS — J30.9 ALLERGIC RHINITIS, UNSPECIFIED SEASONALITY, UNSPECIFIED TRIGGER: ICD-10-CM

## 2018-08-24 DIAGNOSIS — M15.9 PRIMARY OSTEOARTHRITIS INVOLVING MULTIPLE JOINTS: ICD-10-CM

## 2018-08-24 DIAGNOSIS — G47.33 OBSTRUCTIVE SLEEP APNEA SYNDROME: ICD-10-CM

## 2018-08-24 DIAGNOSIS — K21.9 GASTROESOPHAGEAL REFLUX DISEASE WITHOUT ESOPHAGITIS: ICD-10-CM

## 2018-08-24 DIAGNOSIS — E11.9 TYPE 2 DIABETES MELLITUS WITHOUT COMPLICATION, WITHOUT LONG-TERM CURRENT USE OF INSULIN (HCC): Primary | ICD-10-CM

## 2018-08-24 DIAGNOSIS — E78.2 MIXED HYPERLIPIDEMIA: ICD-10-CM

## 2018-08-24 LAB
ANION GAP SERPL CALCULATED.3IONS-SCNC: 17 MMOL/L (ref 3–16)
BUN BLDV-MCNC: 15 MG/DL (ref 7–20)
CALCIUM SERPL-MCNC: 10 MG/DL (ref 8.3–10.6)
CHLORIDE BLD-SCNC: 103 MMOL/L (ref 99–110)
CO2: 23 MMOL/L (ref 21–32)
CREAT SERPL-MCNC: 0.7 MG/DL (ref 0.6–1.2)
GFR AFRICAN AMERICAN: >60
GFR NON-AFRICAN AMERICAN: >60
GLUCOSE BLD-MCNC: 160 MG/DL (ref 70–99)
HBA1C MFR BLD: 6.8 %
POTASSIUM SERPL-SCNC: 4.6 MMOL/L (ref 3.5–5.1)
SODIUM BLD-SCNC: 143 MMOL/L (ref 136–145)

## 2018-08-24 PROCEDURE — 83036 HEMOGLOBIN GLYCOSYLATED A1C: CPT | Performed by: FAMILY MEDICINE

## 2018-08-24 PROCEDURE — 99214 OFFICE O/P EST MOD 30 MIN: CPT | Performed by: FAMILY MEDICINE

## 2018-08-24 NOTE — PATIENT INSTRUCTIONS
MA: Please recheck blood pressure. INSTRUCTIONS  · NEXT APPOINTMENT:Please schedule check-up in 3 months. · PLEASE TAKE THIS FORM TO CHECK-OUT WINDOW TO SCHEDULE NEXT VISIT. · PLEASE GET BLOODWORK DRAWN TODAY ON FIRST FLOOR in 170. Take orders with you. RESULTS- most blood tests back in couple days. We will call you if any problems. If bloodwork good, you will get letter in mail or notified thru [address] (if signed up) within 2 weeks. If you do not, please call office. · Please get flu vaccine when available in fall. Can get either at this office or at stores such as [email or web address]. · Please get annual dilated eye exam to screen for diabetic retinopathy which can lead to vision loss. Ask for report to be faxed to [phone]. · Medicare part D patients:  Please take Rx for Shingrix to pharmacy (such as [facility] or [facility]) to get shingles vaccine. Need second dose in 2-6 months. · Please drop in to see medical assistant to repeat blood pressure in 2 weeks. May need to start lisinopril HCT  10/12. 5.

## 2018-08-24 NOTE — PROGRESS NOTES
current medications. POCT glycosylated hemoglobin (Hb A1C)    Basic Metabolic Panel   3. Mixed hyperlipidemia  Stable with current medications. 4. Need for shingles vaccine  zoster recombinant adjuvanted vaccine (SHINGRIX) 50 MCG SUSR injection   5. Obstructive sleep apnea syndrome  Good with CPAP   6. Primary osteoarthritis involving multiple joints  stable   7. Allergic rhinitis, unspecified seasonality, unspecified trigger  syable   8. Gastroesophageal reflux disease without esophagitis  Stable with current medications. 9. Elevated BP without diagnosis of hypertension  NEW  Basic Metabolic Panel   Previous labs reviewed as above.     MA: Please recheck blood pressure. INSTRUCTIONS  NEXT APPOINTMENT: Please schedule check-up in 3 months. · PLEASE TAKE THIS FORM TO CHECK-OUT WINDOW TO SCHEDULE NEXT VISIT. · PLEASE GET BLOODWORK DRAWN TODAY ON FIRST FLOOR in 170. Take orders with you. RESULTS- most blood tests back in couple days. We will call you if any problems. If bloodwork good, you will get letter in mail or notified thru [address] (if signed up) within 2 weeks. If you do not, please call office. · Please get flu vaccine when available in fall. Can get either at this office or at stores such as [employer]. · Please get annual dilated eye exam to screen for diabetic retinopathy which can lead to vision loss. Ask for report to be faxed to [phone]. · Medicare part D patients:  Please take Rx for Shingrix to pharmacy (such as [facility] or [facility]) to get shingles vaccine. Need second dose in 2-6 months. · Please drop in to see medical assistant to repeat blood pressure in 2 weeks. May need to start lisinopril HCT  10/12. 5.

## 2018-09-07 ENCOUNTER — NURSE ONLY (OUTPATIENT)
Dept: FAMILY MEDICINE CLINIC | Age: 78
End: 2018-09-07

## 2018-09-07 VITALS — SYSTOLIC BLOOD PRESSURE: 122 MMHG | DIASTOLIC BLOOD PRESSURE: 64 MMHG

## 2018-09-07 DIAGNOSIS — Z01.30 BLOOD PRESSURE CHECK: Primary | ICD-10-CM

## 2018-10-05 ENCOUNTER — OFFICE VISIT (OUTPATIENT)
Dept: FAMILY MEDICINE CLINIC | Age: 78
End: 2018-10-05
Payer: MEDICARE

## 2018-10-05 VITALS
HEIGHT: 66 IN | SYSTOLIC BLOOD PRESSURE: 124 MMHG | HEART RATE: 66 BPM | RESPIRATION RATE: 16 BRPM | WEIGHT: 166 LBS | BODY MASS INDEX: 26.68 KG/M2 | DIASTOLIC BLOOD PRESSURE: 80 MMHG | TEMPERATURE: 98.7 F

## 2018-10-05 DIAGNOSIS — R20.2 PARESTHESIA OF SKIN: Primary | ICD-10-CM

## 2018-10-05 PROCEDURE — 99213 OFFICE O/P EST LOW 20 MIN: CPT | Performed by: FAMILY MEDICINE

## 2018-10-05 RX ORDER — GABAPENTIN 100 MG/1
100 CAPSULE ORAL 3 TIMES DAILY
COMMUNITY
End: 2019-03-07

## 2018-10-05 ASSESSMENT — PATIENT HEALTH QUESTIONNAIRE - PHQ9
SUM OF ALL RESPONSES TO PHQ9 QUESTIONS 1 & 2: 0
2. FEELING DOWN, DEPRESSED OR HOPELESS: 0
1. LITTLE INTEREST OR PLEASURE IN DOING THINGS: 0
SUM OF ALL RESPONSES TO PHQ QUESTIONS 1-9: 0
SUM OF ALL RESPONSES TO PHQ QUESTIONS 1-9: 0

## 2018-10-05 NOTE — PROGRESS NOTES
Scribe documentation: Sheryle Setter , am scribing for Tati Fajardo MD. Electronically signed by Jeremy Castle  on 10/5/2018 at 10:07 AM  MD Attestation: Yuliya Salinasine,  personally performed the services described in this documentation, as scribed by the user listed above, and it is both accurate and complete. CHART REVIEW  Health Maintenance   Topic Date Due    Shingles Vaccine (1 of 2 - 2 Dose Series) 03/29/1989    Diabetic retinal exam  12/21/2018    Diabetic foot exam  12/29/2018    Lipid screen  12/29/2018    A1C test (Diabetic or Prediabetic)  02/24/2019    DTaP/Tdap/Td vaccine (3 - Td) 06/11/2026    DEXA (modify frequency per FRAX score)  Completed    Flu vaccine  Completed    Pneumococcal low/med risk  Completed     Social History     Social History    Marital status:      Spouse name: Lanny Viera Number of children: N/A    Years of education: N/A     Social History Main Topics    Smoking status: Never Smoker    Smokeless tobacco: Never Used      Comment: Advised not to start.  Alcohol use 0.0 oz/week      Comment: light    Drug use: No    Sexual activity: Yes     Partners: Male      Comment:      Other Topics Concern    None     Social History Narrative    Self-breast exams: yes. Exercise: [employer] sometimes, walk sometimes. Seatbelt use: Always. Living will: yes, copy requested.        The ASCVD Risk score ([place]., et al., 2013) failed to calculate for the following reasons: The valid HDL cholesterol range is 20 to 100 mg/dL  Prior to Visit Medications    Medication Sig Taking? Authorizing Provider   gabapentin (NEURONTIN) 100 MG capsule Take 100 mg by mouth 3 times daily. . Yes Historical Provider, MD   atorvastatin (LIPITOR) 80 MG tablet Take 1 tablet by mouth daily Yes Ayah Venegas MD   metFORMIN (GLUCOPHAGE-XR) 500 MG extended release tablet Take 1 tablet by mouth 2 times daily Yes Ayah Venegas MD   ferrous sulfate 325 (65 Fe) MG tablet Take 325 LABA1C 6.8 08/24/2018     Current Outpatient Prescriptions   Medication Sig Dispense Refill    gabapentin (NEURONTIN) 100 MG capsule Take 100 mg by mouth 3 times daily. Cammie Mauro atorvastatin (LIPITOR) 80 MG tablet Take 1 tablet by mouth daily 90 tablet 1    metFORMIN (GLUCOPHAGE-XR) 500 MG extended release tablet Take 1 tablet by mouth 2 times daily 180 tablet 0    ferrous sulfate 325 (65 Fe) MG tablet Take 325 mg by mouth daily (with breakfast) 5/03/18--Bought OTC recently when blood counts came [facility]      solifenacin (VESICARE) 10 MG tablet Take 1 tablet by mouth daily 90 tablet 3    naproxen sodium (ALEVE) 220 MG tablet Take 550 mg by mouth 2 times daily (with meals)      CPAP Machine MISC by Does not apply route      glucose blood VI test strips (ONE TOUCH ULTRA TEST) strip Daily. DM 2   250.00 100 strip 3    aspirin 81 MG EC tablet Take 81 mg by mouth daily. No current facility-administered medications for this visit. Review of Systems   General ROS: fever? No,    Night sweats? No  Endocrine ROS:malaise/lethargy? No   Unexpected weight changes? No  Respiratory ROS: cough? No   Shortness of breath? No  Cardiovascular ROS:chest pain? No   Shortness of breath with exertion? No  Gastrointestinal ROS: abdominal pain? No   Change in stools? No  Genito-Urinary ROS: painful urination? No   Trouble voiding? No  Neurological ROS: TIA or stroke symptoms? No   Numbness/tingling in feet? No     Objective:    PHYSICAL EXAM   /80 (Site: Left Upper Arm, Position: Sitting, Cuff Size: Large Adult)   Pulse 66   Temp 98.7 °F (37.1 °C) (Oral)   Resp 16   Ht 5' 6\" (1.676 m)   Wt 166 lb (75.3 kg)   BMI 26.79 kg/m²   Blood pressure is Good. BP Readings from Last 5 Encounters:   10/05/18 124/80   09/07/18 122/64   08/24/18 (!) 152/70   08/22/18 (!) 150/68   05/07/18 120/70     Weight is unchanged.    Wt Readings from Last 5 Encounters:   10/05/18 166 lb (75.3 kg)

## 2018-10-06 ENCOUNTER — PATIENT MESSAGE (OUTPATIENT)
Dept: FAMILY MEDICINE CLINIC | Age: 78
End: 2018-10-06

## 2018-10-09 RX ORDER — TOLTERODINE 4 MG/1
4 CAPSULE, EXTENDED RELEASE ORAL DAILY
Qty: 30 CAPSULE | Refills: 3 | Status: SHIPPED | OUTPATIENT
Start: 2018-10-09 | End: 2019-02-04 | Stop reason: SDUPTHER

## 2018-11-01 DIAGNOSIS — E11.9 TYPE 2 DIABETES MELLITUS WITHOUT COMPLICATION, WITHOUT LONG-TERM CURRENT USE OF INSULIN (HCC): ICD-10-CM

## 2018-11-01 RX ORDER — METFORMIN HYDROCHLORIDE 500 MG/1
500 TABLET, EXTENDED RELEASE ORAL 2 TIMES DAILY
Qty: 180 TABLET | Refills: 0 | Status: SHIPPED | OUTPATIENT
Start: 2018-11-01 | End: 2019-02-04 | Stop reason: SDUPTHER

## 2018-12-10 ENCOUNTER — OFFICE VISIT (OUTPATIENT)
Dept: FAMILY MEDICINE CLINIC | Age: 78
End: 2018-12-10
Payer: MEDICARE

## 2018-12-10 VITALS
DIASTOLIC BLOOD PRESSURE: 82 MMHG | BODY MASS INDEX: 27.64 KG/M2 | HEIGHT: 66 IN | SYSTOLIC BLOOD PRESSURE: 136 MMHG | OXYGEN SATURATION: 97 % | HEART RATE: 65 BPM | WEIGHT: 172 LBS

## 2018-12-10 DIAGNOSIS — E78.2 MIXED HYPERLIPIDEMIA: ICD-10-CM

## 2018-12-10 DIAGNOSIS — N39.3 STRESS INCONTINENCE IN FEMALE: ICD-10-CM

## 2018-12-10 DIAGNOSIS — E11.9 TYPE 2 DIABETES MELLITUS WITHOUT COMPLICATION, WITHOUT LONG-TERM CURRENT USE OF INSULIN (HCC): Primary | ICD-10-CM

## 2018-12-10 DIAGNOSIS — E11.9 TYPE 2 DIABETES MELLITUS WITHOUT COMPLICATION, WITHOUT LONG-TERM CURRENT USE OF INSULIN (HCC): ICD-10-CM

## 2018-12-10 LAB
A/G RATIO: 1.9 (ref 1.1–2.2)
ALBUMIN SERPL-MCNC: 4.3 G/DL (ref 3.4–5)
ALP BLD-CCNC: 131 U/L (ref 40–129)
ALT SERPL-CCNC: 23 U/L (ref 10–40)
ANION GAP SERPL CALCULATED.3IONS-SCNC: 12 MMOL/L (ref 3–16)
AST SERPL-CCNC: 22 U/L (ref 15–37)
BILIRUB SERPL-MCNC: 1.3 MG/DL (ref 0–1)
BUN BLDV-MCNC: 22 MG/DL (ref 7–20)
CALCIUM SERPL-MCNC: 9.5 MG/DL (ref 8.3–10.6)
CHLORIDE BLD-SCNC: 106 MMOL/L (ref 99–110)
CHOLESTEROL, TOTAL: 181 MG/DL (ref 0–199)
CO2: 25 MMOL/L (ref 21–32)
CREAT SERPL-MCNC: 0.7 MG/DL (ref 0.6–1.2)
GFR AFRICAN AMERICAN: >60
GFR NON-AFRICAN AMERICAN: >60
GLOBULIN: 2.3 G/DL
GLUCOSE BLD-MCNC: 168 MG/DL (ref 70–99)
HBA1C MFR BLD: 7.1 %
HDLC SERPL-MCNC: 90 MG/DL (ref 40–60)
LDL CHOLESTEROL CALCULATED: 75 MG/DL
POTASSIUM SERPL-SCNC: 4.5 MMOL/L (ref 3.5–5.1)
SODIUM BLD-SCNC: 143 MMOL/L (ref 136–145)
TOTAL PROTEIN: 6.6 G/DL (ref 6.4–8.2)
TRIGL SERPL-MCNC: 81 MG/DL (ref 0–150)
VLDLC SERPL CALC-MCNC: 16 MG/DL

## 2018-12-10 PROCEDURE — 99214 OFFICE O/P EST MOD 30 MIN: CPT | Performed by: FAMILY MEDICINE

## 2018-12-10 PROCEDURE — 83036 HEMOGLOBIN GLYCOSYLATED A1C: CPT | Performed by: FAMILY MEDICINE

## 2018-12-10 NOTE — PROGRESS NOTES
smokeless tobacco.    Review of Systems   General ROS: fever? No,    Night sweats? No  Ophthalmic ROS: change in vision? No  Endocrine ROS: fatigue? No   Unexpected weight changes? No  Respiratory ROS: shortness of breath? No  Cardiovascular ROS: chest pain? No  Gastrointestinal ROS: abdominal pain? No  Genito-Urinary ROS: painful urination? No   Trouble urinating? No  Neurological ROS: TIA or stroke symptoms? No   Numbness/tingling? No  Dermatological ROS: rash or sores on feet? No     HISTORY:  Patient's medications, allergies, past medical, and social histories were reviewed and updated as appropriate (See above). Objective:   PHYSICAL EXAM   /82 (Site: Right Upper Arm, Position: Sitting, Cuff Size: Large Adult)   Pulse 65   Ht 5' 6\" (1.676 m)   Wt 172 lb (78 kg)   SpO2 97%   BMI 27.76 kg/m²   Blood pressure is fair. BP Readings from Last 5 Encounters:   12/10/18 136/82   10/05/18 124/80   09/07/18 122/64   08/24/18 (!) 152/70   08/22/18 (!) 150/68     Weight is increased. Wt Readings from Last 5 Encounters:   12/10/18 172 lb (78 kg)   10/05/18 166 lb (75.3 kg)   08/24/18 165 lb (74.8 kg)   08/22/18 167 lb (75.8 kg)   05/07/18 163 lb (73.9 kg)      GENERAL:   · well-developed, well-nourished, alert, no distress.      EYES: glasses  · R upper lid pink with mild edema, no lumps  LUNGS:    · Breathing unlabored  · clear to auscultation bilaterally and good air movement  CARDIOVASC:   · regular rate and rhythm, S1, S2 normal. No murmur, click, rub or gallop  · Apical impulse normal  · LEGS:  Lower extremity edema: none    SKIN: warm and dry  PSYCH:    · Alert and oriented  · Normal reasoning, insight good  · Facial expressions full, mood appropriate  · No memory disturbance noted  MUSCULOSKEL:    · No significant finger or nail findings  NEURO:   · CN 2-12 intact  Diabetic Foot Exam  · Foot exam reveals normal cap refill  · Feet normal skin color, no callouses, no ulcers, no venous stasis  · Feet- no deformities  · sensation grossly normal to light touch in feet. FEET:Monofilament Sensation:  normal      Assessment and Plan:      Diagnosis Orders   1. Type 2 diabetes mellitus without complication, without long-term current use of insulin ([facility])  well controlled, no significant medication side effects noted. Medication: no change. A1c today.  DIABETES FOOT EXAM    Comprehensive Metabolic Panel    POCT glycosylated hemoglobin (Hb A1C)   2. Mixed hyperlipidemia  Last test showed control to be well controlled. No significant medication side effects noted. Continue current therapy. Labs as below. Comprehensive Metabolic Panel    Lipid Panel   3. Stress incontinence in female  Stable with current medications. Previous labs reviewed as above.     INSTRUCTIONS  NEXT APPOINTMENT: Please schedule annual complete physical (30 minutes) in 3 months. · PLEASE TAKE THIS FORM TO CHECK-OUT WINDOW TO SCHEDULE NEXT VISIT. · PLEASE GET BLOODWORK DRAWN TODAY ON FIRST FLOOR in 170. Take orders with you. RESULTS- most blood tests back in couple days. We will call you if any problems. If bloodwork good, you will get letter in mail or notified thru [address] (if signed up) within 2 weeks. If you do not, please call office. · Make better food choices.

## 2019-01-08 RX ORDER — ATORVASTATIN CALCIUM 80 MG/1
80 TABLET, FILM COATED ORAL DAILY
Qty: 90 TABLET | Refills: 1 | Status: SHIPPED | OUTPATIENT
Start: 2019-01-08 | End: 2019-05-01 | Stop reason: SDUPTHER

## 2019-02-04 DIAGNOSIS — E11.9 TYPE 2 DIABETES MELLITUS WITHOUT COMPLICATION, WITHOUT LONG-TERM CURRENT USE OF INSULIN (HCC): ICD-10-CM

## 2019-02-04 RX ORDER — TOLTERODINE 4 MG/1
4 CAPSULE, EXTENDED RELEASE ORAL DAILY
Qty: 30 CAPSULE | Refills: 2 | Status: SHIPPED | OUTPATIENT
Start: 2019-02-04 | End: 2019-05-01 | Stop reason: SDUPTHER

## 2019-02-04 RX ORDER — METFORMIN HYDROCHLORIDE 500 MG/1
500 TABLET, EXTENDED RELEASE ORAL 2 TIMES DAILY
Qty: 180 TABLET | Refills: 0 | Status: SHIPPED | OUTPATIENT
Start: 2019-02-04 | End: 2019-03-07 | Stop reason: SDUPTHER

## 2019-03-07 ENCOUNTER — OFFICE VISIT (OUTPATIENT)
Dept: FAMILY MEDICINE CLINIC | Age: 79
End: 2019-03-07
Payer: MEDICARE

## 2019-03-07 VITALS
DIASTOLIC BLOOD PRESSURE: 70 MMHG | HEART RATE: 67 BPM | HEIGHT: 66 IN | WEIGHT: 174 LBS | SYSTOLIC BLOOD PRESSURE: 118 MMHG | BODY MASS INDEX: 27.97 KG/M2 | TEMPERATURE: 97.6 F | RESPIRATION RATE: 16 BRPM

## 2019-03-07 DIAGNOSIS — R39.15 URINARY URGENCY: Primary | ICD-10-CM

## 2019-03-07 DIAGNOSIS — E11.00 TYPE 2 DIABETES MELLITUS WITH HYPEROSMOLARITY WITHOUT COMA, WITHOUT LONG-TERM CURRENT USE OF INSULIN (HCC): ICD-10-CM

## 2019-03-07 DIAGNOSIS — E11.9 TYPE 2 DIABETES MELLITUS WITHOUT COMPLICATION, WITHOUT LONG-TERM CURRENT USE OF INSULIN (HCC): ICD-10-CM

## 2019-03-07 LAB
BILIRUBIN, POC: NEGATIVE
BLOOD URINE, POC: NEGATIVE
CLARITY, POC: CLEAR
COLOR, POC: ABNORMAL
GLUCOSE BLD-MCNC: 277 MG/DL
GLUCOSE URINE, POC: ABNORMAL
HBA1C MFR BLD: 7.2 %
KETONES, POC: NEGATIVE
LEUKOCYTE EST, POC: ABNORMAL
NITRITE, POC: NEGATIVE
PH, POC: 6
PROTEIN, POC: ABNORMAL
SPECIFIC GRAVITY, POC: 1.02
UROBILINOGEN, POC: ABNORMAL

## 2019-03-07 PROCEDURE — 82962 GLUCOSE BLOOD TEST: CPT | Performed by: FAMILY MEDICINE

## 2019-03-07 PROCEDURE — 99213 OFFICE O/P EST LOW 20 MIN: CPT | Performed by: FAMILY MEDICINE

## 2019-03-07 PROCEDURE — 83036 HEMOGLOBIN GLYCOSYLATED A1C: CPT | Performed by: FAMILY MEDICINE

## 2019-03-07 PROCEDURE — 81002 URINALYSIS NONAUTO W/O SCOPE: CPT | Performed by: FAMILY MEDICINE

## 2019-03-07 RX ORDER — PHENAZOPYRIDINE HYDROCHLORIDE 100 MG/1
100 TABLET, FILM COATED ORAL 3 TIMES DAILY PRN
Qty: 9 TABLET | Refills: 0 | Status: SHIPPED | OUTPATIENT
Start: 2019-03-07 | End: 2019-03-10

## 2019-03-07 RX ORDER — METFORMIN HYDROCHLORIDE 500 MG/1
1000 TABLET, EXTENDED RELEASE ORAL 2 TIMES DAILY
Qty: 360 TABLET | Refills: 0 | Status: SHIPPED | OUTPATIENT
Start: 2019-03-07 | End: 2019-05-01 | Stop reason: SDUPTHER

## 2019-03-07 RX ORDER — SULFAMETHOXAZOLE AND TRIMETHOPRIM 800; 160 MG/1; MG/1
1 TABLET ORAL 2 TIMES DAILY
Qty: 10 TABLET | Refills: 0 | Status: SHIPPED | OUTPATIENT
Start: 2019-03-07 | End: 2019-03-12

## 2019-03-07 ASSESSMENT — PATIENT HEALTH QUESTIONNAIRE - PHQ9
SUM OF ALL RESPONSES TO PHQ QUESTIONS 1-9: 0
1. LITTLE INTEREST OR PLEASURE IN DOING THINGS: 0
SUM OF ALL RESPONSES TO PHQ QUESTIONS 1-9: 0
2. FEELING DOWN, DEPRESSED OR HOPELESS: 0
SUM OF ALL RESPONSES TO PHQ9 QUESTIONS 1 & 2: 0

## 2019-03-10 LAB
ORGANISM: ABNORMAL
URINE CULTURE, ROUTINE: ABNORMAL
URINE CULTURE, ROUTINE: ABNORMAL

## 2019-03-11 ENCOUNTER — TELEPHONE (OUTPATIENT)
Dept: FAMILY MEDICINE CLINIC | Age: 79
End: 2019-03-11

## 2019-03-14 ENCOUNTER — TELEPHONE (OUTPATIENT)
Dept: FAMILY MEDICINE CLINIC | Age: 79
End: 2019-03-14

## 2019-05-01 ENCOUNTER — OFFICE VISIT (OUTPATIENT)
Dept: FAMILY MEDICINE CLINIC | Age: 79
End: 2019-05-01
Payer: MEDICARE

## 2019-05-01 VITALS
BODY MASS INDEX: 26.84 KG/M2 | HEART RATE: 67 BPM | RESPIRATION RATE: 18 BRPM | DIASTOLIC BLOOD PRESSURE: 74 MMHG | WEIGHT: 167 LBS | HEIGHT: 66 IN | SYSTOLIC BLOOD PRESSURE: 126 MMHG | TEMPERATURE: 98.5 F

## 2019-05-01 DIAGNOSIS — Z96.652 HX OF TOTAL KNEE REPLACEMENT, LEFT: ICD-10-CM

## 2019-05-01 DIAGNOSIS — E78.5 HYPERLIPIDEMIA LDL GOAL <100: ICD-10-CM

## 2019-05-01 DIAGNOSIS — E11.9 TYPE 2 DIABETES MELLITUS WITHOUT COMPLICATION, WITHOUT LONG-TERM CURRENT USE OF INSULIN (HCC): ICD-10-CM

## 2019-05-01 DIAGNOSIS — M15.9 GENERALIZED OSTEOARTHRITIS: ICD-10-CM

## 2019-05-01 DIAGNOSIS — Z00.00 MEDICARE ANNUAL WELLNESS VISIT, SUBSEQUENT: Primary | ICD-10-CM

## 2019-05-01 DIAGNOSIS — N39.3 STRESS INCONTINENCE IN FEMALE: ICD-10-CM

## 2019-05-01 DIAGNOSIS — G47.33 OBSTRUCTIVE SLEEP APNEA SYNDROME: ICD-10-CM

## 2019-05-01 PROBLEM — M17.12 PRIMARY OSTEOARTHRITIS OF LEFT KNEE: Status: RESOLVED | Noted: 2017-01-06 | Resolved: 2019-05-01

## 2019-05-01 PROBLEM — L82.0 INFLAMED SEBORRHEIC KERATOSIS: Status: RESOLVED | Noted: 2018-03-14 | Resolved: 2019-05-01

## 2019-05-01 PROBLEM — R03.0 ELEVATED BP WITHOUT DIAGNOSIS OF HYPERTENSION: Status: RESOLVED | Noted: 2018-08-24 | Resolved: 2019-05-01

## 2019-05-01 LAB — HBA1C MFR BLD: 6.9 %

## 2019-05-01 PROCEDURE — G0439 PPPS, SUBSEQ VISIT: HCPCS | Performed by: FAMILY MEDICINE

## 2019-05-01 PROCEDURE — 83036 HEMOGLOBIN GLYCOSYLATED A1C: CPT | Performed by: FAMILY MEDICINE

## 2019-05-01 RX ORDER — ATORVASTATIN CALCIUM 80 MG/1
80 TABLET, FILM COATED ORAL DAILY
Qty: 90 TABLET | Refills: 3 | Status: SHIPPED | OUTPATIENT
Start: 2019-05-01 | End: 2019-07-09 | Stop reason: SDUPTHER

## 2019-05-01 RX ORDER — METFORMIN HYDROCHLORIDE 500 MG/1
1000 TABLET, EXTENDED RELEASE ORAL 2 TIMES DAILY
Qty: 360 TABLET | Refills: 0 | Status: SHIPPED | OUTPATIENT
Start: 2019-05-01 | End: 2019-07-09 | Stop reason: SDUPTHER

## 2019-05-01 RX ORDER — TOLTERODINE 4 MG/1
4 CAPSULE, EXTENDED RELEASE ORAL DAILY
Qty: 90 CAPSULE | Refills: 3 | Status: SHIPPED | OUTPATIENT
Start: 2019-05-01 | End: 2019-09-25

## 2019-05-01 ASSESSMENT — LIFESTYLE VARIABLES
HOW OFTEN DURING THE LAST YEAR HAVE YOU NEEDED AN ALCOHOLIC DRINK FIRST THING IN THE MORNING TO GET YOURSELF GOING AFTER A NIGHT OF HEAVY DRINKING: 0
HOW OFTEN DURING THE LAST YEAR HAVE YOU HAD A FEELING OF GUILT OR REMORSE AFTER DRINKING: 0
AUDIT TOTAL SCORE: 5
HOW OFTEN DO YOU HAVE SIX OR MORE DRINKS ON ONE OCCASION: 1
HOW OFTEN DURING THE LAST YEAR HAVE YOU BEEN UNABLE TO REMEMBER WHAT HAPPENED THE NIGHT BEFORE BECAUSE YOU HAD BEEN DRINKING: 0
HOW MANY STANDARD DRINKS CONTAINING ALCOHOL DO YOU HAVE ON A TYPICAL DAY: 0
HOW OFTEN DO YOU HAVE A DRINK CONTAINING ALCOHOL: 4
HAVE YOU OR SOMEONE ELSE BEEN INJURED AS A RESULT OF YOUR DRINKING: 0
HAS A RELATIVE, FRIEND, DOCTOR, OR ANOTHER HEALTH PROFESSIONAL EXPRESSED CONCERN ABOUT YOUR DRINKING OR SUGGESTED YOU CUT DOWN: 0
HOW OFTEN DURING THE LAST YEAR HAVE YOU FOUND THAT YOU WERE NOT ABLE TO STOP DRINKING ONCE YOU HAD STARTED: 0
HOW OFTEN DURING THE LAST YEAR HAVE YOU FAILED TO DO WHAT WAS NORMALLY EXPECTED FROM YOU BECAUSE OF DRINKING: 0
AUDIT-C TOTAL SCORE: 5

## 2019-05-01 ASSESSMENT — ANXIETY QUESTIONNAIRES: GAD7 TOTAL SCORE: 0

## 2019-05-01 ASSESSMENT — PATIENT HEALTH QUESTIONNAIRE - PHQ9
SUM OF ALL RESPONSES TO PHQ QUESTIONS 1-9: 0

## 2019-05-01 NOTE — PROGRESS NOTES
Medicare Annual Wellness Visit  Name: Milind Candelario  YOB: 1940  Age: [de-identified] y.o. Sex: female  MRN: [id number]     Date of Service:  5/1/2019    Chief Complaint:   Milind Candelario is a [de-identified] y.o. female who presents for Medicare Annual Wellness Visit and check-up for:  1. Medicare annual wellness visit, subsequent    2. Type 2 diabetes mellitus without complication, without long-term current use of insulin (HCC)    3. Stress incontinence in female - Stable with current medications. 4. Hyperlipidemia LDL goal <100    5. Generalized osteoarthritis    6. Hx of total knee replacement, left    7. Sleep apnea- doping good with CPAP  HPI    Chief Complaint   Patient presents with    Medicare AWV   Complaints: should she continue Asprin, she seems to bleed more than usual, and she has woke up a couple mornings a week ago and was nauseous and once woke up and had bad runs and isn't sure what's going on. Review of Systems   General ROS: fever? No,    Night sweats? No  Ophthalmic ROS: change in vision? No  Endocrine ROS: fatigue? No   Unexpected weight changes? No  Hematological and Lymphatic ROS: easy bruising? No   Swollen lymph nodes? No  ENT ROS: headaches? No   Sore throat? No  Respiratory ROS: cough? No   Wheezing? No  Cardiovascular ROS: chest pain? No   Shortness of breath? No  Gastrointestinal ROS: abdominal pain? No   Change in stools? No  Genito-Urinary ROS: painful urination? No   Trouble urinating? No  Musculoskeletal ROS: trouble walking? Yes old problems    Joint pain? No  Neurological ROS: TIA or stroke symptoms? No   Numbness/tingling? Yes old problems   Dermatological ROS: rash? No   Changes in skin spots? No    * Documentation provided by medical assistant reviewed and updated by provider. HISTORY:  Patient's medications, allergies, past medical, and social histories were reviewed and updated as appropriate.      CHART REVIEW  Health Maintenance   Topic Date Due    Shingles Vaccine (2 of 2) 05/01/2020 (Originally 5/1/2019)    A1C test (Diabetic or Prediabetic)  09/07/2019    Diabetic retinal exam  12/03/2019    Diabetic foot exam  12/10/2019    Lipid screen  12/10/2019    DTaP/Tdap/Td vaccine (3 - Td) 06/11/2026    DEXA (modify frequency per FRAX score)  Completed    Flu vaccine  Completed    Pneumococcal 65+ years Vaccine  Completed     BLEEDING RISK  Hypertension (1 point)   Abnormal liver function (1 point)   Abnormal renal function (1 point)   Stroke (1 point)   Bleeding tendency or predisposition (1 point)   Labile INRs in patients taking warfarin (1 point)   Elderly: age greater than 72 years (1 point)   Drugs: Concomitant antiplatelet (1 point)   Drugs: Concomitant excess alcohol use (1 point)   0 points: 11.3% bleeds per 10 patient-years (0.45% cranial)  1 point:  10.2% bleeds per 10 patient-years (0.41% cranial)  2 points: 18.8% bleeds per 10 patient-years (0.75% cranial)  3 points: 37.4% bleeds per 10 patient-years (1.5% cranial)  4 points: 87.0% bleeds per 10 patient-years (3.5% cranial)    The ASCVD Risk score (Gilelina Rice., et al., 2013) failed to calculate for the following reasons: The 2013 ASCVD risk score is only valid for ages 36 to 78  Prior to Visit Medications    Medication Sig Taking? Authorizing Provider   metFORMIN (GLUCOPHAGE-XR) 500 MG extended release tablet Take 2 tablets by mouth 2 times daily Yes Will Powers MD   tolterodine (DETROL LA) 4 MG extended release capsule Take 1 capsule by mouth daily Yes Will Powers MD   atorvastatin (LIPITOR) 80 MG tablet Take 1 tablet by mouth daily Yes Will Powers MD   naproxen sodium (ALEVE) 220 MG tablet Take 550 mg by mouth 2 times daily (with meals) Yes Historical Provider, MD   CPAP Machine MISC by Does not apply route Yes Historical Provider, MD   glucose blood VI test strips (ONE TOUCH ULTRA TEST) strip Daily.        DM 2   250.00 Yes Will Powers MD   solifenacin (VESICARE) 10 MG tablet Take 1 tablet by mouth daily.  Colin Bonner MD      Family History   Problem Relation Age of Onset    Diabetes Mother     Cancer Mother         SCC, breast, colon polyp    Heart Disease Father     Heart Disease Brother     Cancer Brother         lung, liver     Social History     Tobacco Use    Smoking status: Never Smoker    Smokeless tobacco: Never Used    Tobacco comment: Advised not to start. Substance Use Topics    Alcohol use:  Yes     Alcohol/week: 0.0 oz     Comment: light    Drug use: No      Immunization History   Administered Date(s) Administered    Influenza Virus Vaccine 01/28/2010    Influenza, High Dose (Fluzone 65 yrs and older) 10/28/2014, 10/19/2015, 08/22/2016, 10/30/2017, 09/28/2018    Pneumococcal 13-valent Conjugate ([id number]) 10/19/2015    Pneumococcal Conjugate 7-valent 09/13/2007    Pneumococcal Polysaccharide ([id number]) 09/13/2007    Tdap (Boostrix, Adacel) 11/17/2005, 06/11/2016    Zoster Subunit (Shingrix) 03/06/2019     LAST LABS  Cholesterol, Total   Date Value Ref Range Status   12/10/2018 181 0 - 199 mg/dL Final     LDL Calculated   Date Value Ref Range Status   12/10/2018 75 <100 mg/dL Final     HDL   Date Value Ref Range Status   12/10/2018 90 (H) 40 - 60 mg/dL Final   10/19/2011 72 (H) 40 - 60 mg/dl Final     Triglycerides   Date Value Ref Range Status   12/10/2018 81 0 - 150 mg/dL Final     Lab Results   Component Value Date    GLUCOSE 277 03/07/2019     Lab Results   Component Value Date     12/10/2018    K 4.5 12/10/2018    CREATININE 0.7 12/10/2018     Lab Results   Component Value Date    WBC 5.7 10/16/2015    HGB 12.0 10/16/2015    HCT 36.3 10/16/2015    MCV 92.7 10/16/2015     10/16/2015     Lab Results   Component Value Date    ALT 23 12/10/2018    AST 22 12/10/2018    ALKPHOS 131 (H) 12/10/2018    BILITOT 1.3 (H) 12/10/2018     TSH (uIU/ml)   Date Value   01/30/2012 1.13     Lab Results   Component Value Date    LABA1C 7.2 03/07/2019      CareTeam (Including outside providers/suppliers regularly involved in providing care):   Patient Care Team:  Rachana Nash MD as PCP - General (Family Medicine)  Rachana Nash MD as PCP - [facility] Attributed Provider  Dion Avitia MD as Consulting Physician (Orthopedic Surgery)  Mela Young MD as Consulting Physician (Orthopedic Surgery)  Franki Meyer DO as Consulting Physician (Pulmonology)  Fouzia Park MD as Consulting Physician (Urology)  Ashwin Akers (Chiropractic Medicine)    The following problems were reviewed today and where indicated follow up appointments were made and/or referrals ordered. Positive Risk Factor Screenings with Interventions:     No Positive Risk Factors identified today. Current Health Maintenance Status  Recommendations for Preventive Services Due: see orders. Recommended screening schedule for the next 5-10 years is provided to the patient in written form: see Patient Instructions/AVS.    PHYSICAL EXAM:  VITALS:  /74 (Site: Left Upper Arm, Position: Sitting, Cuff Size: Large Adult)   Pulse 67   Temp 98.5 °F (36.9 °C) (Oral)   Resp 18   Ht 5' 6\" (1.676 m)   Wt 167 lb (75.8 kg)   BMI 26.95 kg/m²   BP Readings from Last 5 Encounters:   05/01/19 126/74   03/07/19 118/70   12/10/18 136/82   10/05/18 124/80   09/07/18 122/64     Wt Readings from Last 5 Encounters:   05/01/19 167 lb (75.8 kg)   03/07/19 174 lb (78.9 kg)   12/10/18 172 lb (78 kg)   10/05/18 166 lb (75.3 kg)   08/24/18 165 lb (74.8 kg)   Body mass index is 26.95 kg/m².   GENERAL: well-developed, well-nourished, alert, no distress, calm   EYES: negative findings: lids and lashes normal and conjunctivae and sclerae normal  ENT: normal TM's and external ear canals both ears  · External nose and ears appear normal  · Pharynx: normal. Exudates: None  · Lips, mucosa, and tongue normal  · Hearing grossly normal.    NECK: No adenopathy, supple, symmetrical, trachea midline  · Thyroid not enlarged, symmetric, no tenderness/mass/nodules  · no cervical nodes, no supraclavicular nodes  LUNGS:  Breathing unlabored  · clear to auscultation bilaterally and good air movement  CARDIOVASC: regular rate and rhythm, S1, S2 normal   LEGS:  Lower extremity edema: none     No carotid bruits  ABDOMEN: Soft, non-tender, no masses  · No hepatosplenomegaly  · No hernias noted. Exam limited by N/A  SKIN: warm and dry  · No rashes or suspicious lesions  PSYCH:  Alert and oriented  · Normal reasoning, insight good  · Facial expressions full, mood appropriate  · No memory disturbance noted  MUSCULOSKEL:  No significant finger or nail findings  · Spine symmetric, no deformities, no kyphosis   GAIT: UP and Go test: <30 seconds with gait: normal.  Speed Normal.  No significant balance checks. No extra steps on turn around. Assistive device: none        Assessment and Plan:      Diagnosis Orders   1. Medicare annual wellness visit, subsequent     2. Type 2 diabetes mellitus without complication, without long-term current use of insulin (HCC)  metFORMIN (GLUCOPHAGE-XR) 500 MG extended release tablet    POCT glycosylated hemoglobin (Hb A1C)   3. Stress incontinence in female  tolterodine (DETROL LA) 4 MG extended release capsule   4. Hyperlipidemia LDL goal <100  atorvastatin (LIPITOR) 80 MG tablet   5. Generalized osteoarthritis     6. Hx of total knee replacement, left     7. Obstructive sleep apnea syndrome     Stable. Plan as above and below. FYI: While Medicare provides you with a FREE ANNUAL PREVENTIVE PHYSICAL, this visit does NOT include management of chronic medical problems or physical examination. Dr. Jorgito Hardwick usually does a combination visit if you have other medical problems so you don't have to come back for another visit. However, this means that there will be a co-pay. INSTRUCTIONS  NEXT APPOINTMENT: Please schedule check-up in 3 months. · PLEASE TAKE THIS FORM TO CHECK-OUT WINDOW TO SCHEDULE NEXT VISIT.

## 2019-05-01 NOTE — PATIENT INSTRUCTIONS
INSTRUCTIONS  NEXT APPOINTMENT: Please schedule check-up in 3 months. · PLEASE TAKE THIS FORM TO CHECK-OUT WINDOW TO SCHEDULE NEXT VISIT. · Please get flu vaccine when available in fall. Can get either at this office or at stores such as [employer]. · STOP aspirin at this time. Patient Education   Personalized Preventive Plan for Klever Corea - 5/1/2019  Medicare offers a range of preventive health benefits. Some of the tests and screenings are paid in full while other may be subject to a deductible, co-insurance, and/or copay. Some of these benefits include a comprehensive review of your medical history including lifestyle, illnesses that may run in your family, and various assessments and screenings as appropriate. After reviewing your medical record and screening and assessments performed today your provider may have ordered immunizations, labs, imaging, and/or referrals for you. A list of these orders (if applicable) as well as your Preventive Care list are included within your After Visit Summary for your review. Other Preventive Recommendations:    · A preventive eye exam performed by an eye specialist is recommended every 1-2 years to screen for glaucoma; cataracts, macular degeneration, and other eye disorders. · A preventive dental visit is recommended every 6 months. · Try to get at least 150 minutes of exercise per week or 10,000 steps per day on a pedometer . · Order or download the FREE \"Exercise & Physical Activity: Your Everyday Guide\" from [employer]. Call 7-134.731.2793 or search [employer] online. · You need 9117-0433 mg of calcium and 8664-0090 IU of vitamin D per day.  It is possible to meet your calcium requirement with diet alone, but a vitamin D supplement is usually necessary to meet this goal.  · When exposed to the sun, use a sunscreen that protects against both UVA and UVB radiation with an SPF of 30 or

## 2019-06-04 RX ORDER — PREDNISONE 20 MG/1
20 TABLET ORAL 2 TIMES DAILY
Qty: 10 TABLET | Refills: 0 | Status: SHIPPED | OUTPATIENT
Start: 2019-06-04 | End: 2019-11-08 | Stop reason: SDUPTHER

## 2019-06-04 RX ORDER — TIZANIDINE 4 MG/1
4 TABLET ORAL 4 TIMES DAILY PRN
Qty: 40 TABLET | Refills: 0 | Status: SHIPPED | OUTPATIENT
Start: 2019-06-04 | End: 2019-11-08 | Stop reason: SDUPTHER

## 2019-06-06 ENCOUNTER — OFFICE VISIT (OUTPATIENT)
Dept: FAMILY MEDICINE CLINIC | Age: 79
End: 2019-06-06
Payer: MEDICARE

## 2019-06-06 VITALS
SYSTOLIC BLOOD PRESSURE: 136 MMHG | WEIGHT: 168 LBS | OXYGEN SATURATION: 98 % | HEART RATE: 64 BPM | RESPIRATION RATE: 14 BRPM | BODY MASS INDEX: 27.12 KG/M2 | DIASTOLIC BLOOD PRESSURE: 74 MMHG

## 2019-06-06 DIAGNOSIS — M54.32 SCIATICA OF LEFT SIDE: Primary | ICD-10-CM

## 2019-06-06 PROCEDURE — 99213 OFFICE O/P EST LOW 20 MIN: CPT | Performed by: FAMILY MEDICINE

## 2019-06-06 NOTE — PATIENT INSTRUCTIONS
INSTRUCTIONS  · NEXT APPOINTMENT: Please schedule check-up in 2 months. · PLEASE TAKE THIS FORM TO CHECK-OUT WINDOW TO SCHEDULE NEXT VISIT. · Finish prednisone  · See ortho as planned later this month. Patient Education               Piriformis Syndrome Rehabilitation Exercises     You may do all of these exercises right away. Piriformis stretch: Lying on your back with both knees bent, rest the ankle of your injured leg over the knee of your uninjured leg. Grasp the thigh of your uninjured leg and pull that knee toward your chest. You will feel a stretch along the buttocks and possibly along the outside of your hip on the injured side. Hold this for 15 to 30 seconds. Repeat 3 times. Standing hamstring stretch: Place the heel of your leg on a stool about 15 inches high. Keep your knee straight. Lean forward, bending at the hips until you feel a mild stretch in the back of your thigh. Make sure you do not roll your shoulders and bend at the waist when doing this or you will stretch your lower back instead. Hold the stretch for 15 to 30 seconds. Repeat 3 times. Pelvic tilt: Lie on your back with your knees bent and your feet flat on the floor. Tighten your abdominal muscles and push your lower back into the floor. Hold this position for 5 seconds, then relax. Do 3 sets of 10. Partial curl: Lie on your back with your knees bent and your feet flat on the floor. Tighten your stomach muscles and flatten your back against the floor. Tuck your chin to your chest. With your hands stretched out in front of you, curl your upper body forward until your shoulders clear the floor. Hold this position for 3 seconds. Don't hold your breath. It helps to breathe out as you lift your shoulders up. Relax. Repeat 10 times. Build to 3 sets of 10. To challenge yourself, clasp your hands behind your head and keep your elbows out to the side. Prone hip extension: Lie on your stomach with your legs straight out behind you.

## 2019-06-06 NOTE — PROGRESS NOTES
Wt 168 lb (76.2 kg)   SpO2 98%   BMI 27.12 kg/m²   BP Readings from Last 5 Encounters:   06/06/19 136/74   05/01/19 126/74   03/07/19 118/70   12/10/18 136/82   10/05/18 124/80     Wt Readings from Last 5 Encounters:   06/06/19 168 lb (76.2 kg)   05/01/19 167 lb (75.8 kg)   03/07/19 174 lb (78.9 kg)   12/10/18 172 lb (78 kg)   10/05/18 166 lb (75.3 kg)      GENERAL:   · well-developed, well-nourished, alert, no distress. MUSCULOSKEL:    · Gait normal, assistive device: none  · No significant finger or nail findings  · Spine symmetric, no deformities, kyphosis mild  · Normal ROM, non-tender to palpation, strength normal, no instability noted in left knee, left hip, spine     Assessment and Plan:      Diagnosis Orders   1. Sciatica of left side     Plan below. INSTRUCTIONS  · NEXT APPOINTMENT: Please schedule check-up in 2 months. · PLEASE TAKE THIS FORM TO CHECK-OUT WINDOW TO SCHEDULE NEXT VISIT. · Finish prednisone  · See ortho as planned later this month.

## 2019-07-09 DIAGNOSIS — E11.9 TYPE 2 DIABETES MELLITUS WITHOUT COMPLICATION, WITHOUT LONG-TERM CURRENT USE OF INSULIN (HCC): ICD-10-CM

## 2019-07-09 DIAGNOSIS — E78.5 HYPERLIPIDEMIA LDL GOAL <100: ICD-10-CM

## 2019-07-11 RX ORDER — ATORVASTATIN CALCIUM 80 MG/1
80 TABLET, FILM COATED ORAL DAILY
Qty: 90 TABLET | Refills: 3 | Status: SHIPPED | OUTPATIENT
Start: 2019-07-11 | End: 2019-12-27 | Stop reason: SDUPTHER

## 2019-07-11 RX ORDER — METFORMIN HYDROCHLORIDE 500 MG/1
1000 TABLET, EXTENDED RELEASE ORAL 2 TIMES DAILY
Qty: 360 TABLET | Refills: 0 | Status: SHIPPED | OUTPATIENT
Start: 2019-07-11 | End: 2019-10-10 | Stop reason: SDUPTHER

## 2019-09-25 ENCOUNTER — OFFICE VISIT (OUTPATIENT)
Dept: FAMILY MEDICINE CLINIC | Age: 79
End: 2019-09-25
Payer: MEDICARE

## 2019-09-25 VITALS
WEIGHT: 167 LBS | DIASTOLIC BLOOD PRESSURE: 74 MMHG | HEART RATE: 71 BPM | BODY MASS INDEX: 26.84 KG/M2 | HEIGHT: 66 IN | RESPIRATION RATE: 16 BRPM | SYSTOLIC BLOOD PRESSURE: 128 MMHG

## 2019-09-25 DIAGNOSIS — R19.7 DIARRHEA, UNSPECIFIED TYPE: ICD-10-CM

## 2019-09-25 DIAGNOSIS — E11.9 TYPE 2 DIABETES MELLITUS WITHOUT COMPLICATION, WITHOUT LONG-TERM CURRENT USE OF INSULIN (HCC): Primary | Chronic | ICD-10-CM

## 2019-09-25 DIAGNOSIS — Z23 NEEDS FLU SHOT: ICD-10-CM

## 2019-09-25 DIAGNOSIS — E78.5 HYPERLIPIDEMIA LDL GOAL <100: Chronic | ICD-10-CM

## 2019-09-25 LAB
A/G RATIO: 2 (ref 1.1–2.2)
ALBUMIN SERPL-MCNC: 4.5 G/DL (ref 3.4–5)
ALP BLD-CCNC: 122 U/L (ref 40–129)
ALT SERPL-CCNC: 14 U/L (ref 10–40)
ANION GAP SERPL CALCULATED.3IONS-SCNC: 15 MMOL/L (ref 3–16)
AST SERPL-CCNC: 19 U/L (ref 15–37)
BASOPHILS ABSOLUTE: 0.1 K/UL (ref 0–0.2)
BASOPHILS RELATIVE PERCENT: 0.9 %
BILIRUB SERPL-MCNC: 0.8 MG/DL (ref 0–1)
BUN BLDV-MCNC: 17 MG/DL (ref 7–20)
CALCIUM SERPL-MCNC: 9.9 MG/DL (ref 8.3–10.6)
CHLORIDE BLD-SCNC: 105 MMOL/L (ref 99–110)
CO2: 24 MMOL/L (ref 21–32)
CREAT SERPL-MCNC: 0.6 MG/DL (ref 0.6–1.2)
EOSINOPHILS ABSOLUTE: 0.3 K/UL (ref 0–0.6)
EOSINOPHILS RELATIVE PERCENT: 5.2 %
GFR AFRICAN AMERICAN: >60
GFR NON-AFRICAN AMERICAN: >60
GLOBULIN: 2.2 G/DL
GLUCOSE BLD-MCNC: 133 MG/DL (ref 70–99)
HBA1C MFR BLD: 7.1 %
HCT VFR BLD CALC: 35.6 % (ref 36–48)
HEMOGLOBIN: 11.6 G/DL (ref 12–16)
LIPASE: 57 U/L (ref 13–60)
LYMPHOCYTES ABSOLUTE: 0.9 K/UL (ref 1–5.1)
LYMPHOCYTES RELATIVE PERCENT: 16.1 %
MCH RBC QN AUTO: 27.4 PG (ref 26–34)
MCHC RBC AUTO-ENTMCNC: 32.5 G/DL (ref 31–36)
MCV RBC AUTO: 84.4 FL (ref 80–100)
MONOCYTES ABSOLUTE: 0.4 K/UL (ref 0–1.3)
MONOCYTES RELATIVE PERCENT: 7.4 %
NEUTROPHILS ABSOLUTE: 3.8 K/UL (ref 1.7–7.7)
NEUTROPHILS RELATIVE PERCENT: 70.4 %
PDW BLD-RTO: 15.3 % (ref 12.4–15.4)
PLATELET # BLD: 318 K/UL (ref 135–450)
PMV BLD AUTO: 8.7 FL (ref 5–10.5)
POTASSIUM SERPL-SCNC: 4.4 MMOL/L (ref 3.5–5.1)
RBC # BLD: 4.21 M/UL (ref 4–5.2)
SODIUM BLD-SCNC: 144 MMOL/L (ref 136–145)
TOTAL PROTEIN: 6.7 G/DL (ref 6.4–8.2)
WBC # BLD: 5.4 K/UL (ref 4–11)

## 2019-09-25 PROCEDURE — G0008 ADMIN INFLUENZA VIRUS VAC: HCPCS | Performed by: FAMILY MEDICINE

## 2019-09-25 PROCEDURE — 83036 HEMOGLOBIN GLYCOSYLATED A1C: CPT | Performed by: FAMILY MEDICINE

## 2019-09-25 PROCEDURE — 90653 IIV ADJUVANT VACCINE IM: CPT | Performed by: FAMILY MEDICINE

## 2019-09-25 PROCEDURE — 99999 PR OFFICE/OUTPT VISIT,PROCEDURE ONLY: CPT | Performed by: FAMILY MEDICINE

## 2019-09-25 NOTE — PROGRESS NOTES
03/07/2019    LABA1C 7.1 12/10/2018     LDL Calculated   Date Value Ref Range Status   12/10/2018 75 <100 mg/dL Final     Lab Results   Component Value Date    HDL 90 (H) 12/10/2018     Lab Results   Component Value Date    TRIG 81 12/10/2018     Lab Results   Component Value Date    GLUCOSE 277 03/07/2019     Lab Results   Component Value Date     12/10/2018    K 4.5 12/10/2018    CREATININE 0.7 12/10/2018     Lab Results   Component Value Date    WBC 5.7 10/16/2015    HGB 12.0 10/16/2015     10/16/2015     Lab Results   Component Value Date    ALT 23 12/10/2018    AST 22 12/10/2018    ALKPHOS 131 (H) 12/10/2018    BILITOT 1.3 (H) 12/10/2018     TSH (uIU/ml)   Date Value   01/30/2012 1.13     HISTORY:  Patient's medications, allergies, past medical, and social histories were reviewed and updated as appropriate. CHART REVIEW  Health Maintenance Due   Topic Date Due    Flu vaccine (1) 09/01/2019     Social History     Tobacco Use    Smoking status: Never Smoker    Smokeless tobacco: Never Used    Tobacco comment: Advised not to start. Substance Use Topics    Alcohol use: Yes     Alcohol/week: 0.0 standard drinks     Comment: light    Drug use: No      The ASCVD Risk score (Judy Ambrose., et al., 2013) failed to calculate for the following reasons: The 2013 ASCVD risk score is only valid for ages 36 to 78  Prior to Visit Medications    Medication Sig Taking?  Authorizing Provider   metFORMIN (GLUCOPHAGE-XR) 500 MG extended release tablet Take 2 tablets by mouth 2 times daily Yes Barry Nelson MD   atorvastatin (LIPITOR) 80 MG tablet Take 1 tablet by mouth daily Yes Barry Nelson MD   tiZANidine (ZANAFLEX) 4 MG tablet Take 1 tablet by mouth 4 times daily as needed (muscle spasms) Watch for sedation Yes Barry Nelson MD   naproxen sodium (ALEVE) 220 MG tablet Take 550 mg by mouth 2 times daily (with meals) Yes Historical Provider, MD   CPAP Machine MISC by Does not apply route Yes Historical Provider, MD   glucose blood VI test strips (ONE TOUCH ULTRA TEST) strip Daily. DM 2   250.00 Yes Eula Gómez MD   solifenacin (VESICARE) 10 MG tablet Take 1 tablet by mouth daily. Eula Gómez MD      Family History   Problem Relation Age of Onset    Diabetes Mother     Cancer Mother         SCC, breast, colon polyp    Heart Disease Father     Heart Disease Brother     Cancer Brother         lung, liver     Objective:   PHYSICAL EXAM   /74 (Site: Left Upper Arm, Position: Sitting, Cuff Size: Large Adult)   Pulse 71   Resp 16   Ht 5' 6\" (1.676 m)   Wt 167 lb (75.8 kg)   BMI 26.95 kg/m²   BP Readings from Last 5 Encounters:   09/25/19 128/74   06/06/19 136/74   05/01/19 126/74   03/07/19 118/70   12/10/18 136/82     Wt Readings from Last 5 Encounters:   09/25/19 167 lb (75.8 kg)   06/06/19 168 lb (76.2 kg)   05/01/19 167 lb (75.8 kg)   03/07/19 174 lb (78.9 kg)   12/10/18 172 lb (78 kg)      GENERAL:   · well-developed, well-nourished, alert, no distress. EYES:   · External findings: lids and lashes normal and conjunctivae and sclerae normal  LUNGS:    · Breathing unlabored  · clear to auscultation bilaterally and good air movement  CARDIOVASC:   · regular rate and rhythm  · LEGS:  Lower extremity edema: none    SKIN: warm and dry  · No suspicious lesion on felipe  PSYCH:    · Alert and oriented  · Normal reasoning, insight good  · Facial expressions full, mood appropriate  · No memory disturbance noted  MUSCULOSKEL:    · No significant finger or nail findings  NEURO:   · CN 2-12 intact  ABDOMEN:   · Soft, non-tender, no masses  · No hepatosplenomegaly  · No hernias noted. Assessment and Plan:      Diagnosis Orders   1. Type 2 diabetes mellitus without complication, without long-term current use of insulin (HCC)  POCT glycosylated hemoglobin (Hb A1C)   2. Needs flu shot  INFLUENZA, QUADV, 3 YRS AND OLDER, IM PF, PREFILL SYR OR SDV, 0.5ML (AFLURIA QUADV, PF)   3.  Hyperlipidemia LDL goal <100     4. Diarrhea, unspecified type  Comprehensive Metabolic Panel    CBC Auto Differential    Lipase   Stable. Continue current Tx plan. Any changes marked below. INSTRUCTIONS  NEXT APPOINTMENT: Please schedule check-up in 3 months. · PLEASE TAKE THIS FORM TO CHECK-OUT WINDOW TO SCHEDULE NEXT VISIT. · PLEASE GET BLOODWORK DRAWN TODAY ON FIRST FLOOR in 170. Take orders with you. RESULTS- most blood tests back in couple days. We will call you if any problems. If bloodwork good, you will get letter in mail or notified thru [address] (if signed up) within 2 weeks. If you do not, please call office. · OTC Culturelle/acidophilus/probiotic for a week or so to restore bacterial balance. · Call for fever or blood in stool. · For mild diarrhea, may take Kaopectate. For severe diarrhea, may take immodium.   · Will refer to GI if persists

## 2019-10-10 DIAGNOSIS — E11.9 TYPE 2 DIABETES MELLITUS WITHOUT COMPLICATION, WITHOUT LONG-TERM CURRENT USE OF INSULIN (HCC): ICD-10-CM

## 2019-10-11 RX ORDER — METFORMIN HYDROCHLORIDE 500 MG/1
1000 TABLET, EXTENDED RELEASE ORAL 2 TIMES DAILY
Qty: 360 TABLET | Refills: 0 | Status: SHIPPED | OUTPATIENT
Start: 2019-10-11 | End: 2020-01-10 | Stop reason: SDUPTHER

## 2019-11-08 ENCOUNTER — OFFICE VISIT (OUTPATIENT)
Dept: FAMILY MEDICINE CLINIC | Age: 79
End: 2019-11-08
Payer: MEDICARE

## 2019-11-08 VITALS
BODY MASS INDEX: 27.76 KG/M2 | SYSTOLIC BLOOD PRESSURE: 136 MMHG | WEIGHT: 172 LBS | RESPIRATION RATE: 12 BRPM | DIASTOLIC BLOOD PRESSURE: 94 MMHG | OXYGEN SATURATION: 98 % | HEART RATE: 76 BPM

## 2019-11-08 DIAGNOSIS — M54.50 CHRONIC BILATERAL LOW BACK PAIN WITHOUT SCIATICA: Primary | ICD-10-CM

## 2019-11-08 DIAGNOSIS — G89.29 CHRONIC BILATERAL LOW BACK PAIN WITHOUT SCIATICA: Primary | ICD-10-CM

## 2019-11-08 PROCEDURE — 99213 OFFICE O/P EST LOW 20 MIN: CPT | Performed by: NURSE PRACTITIONER

## 2019-11-08 RX ORDER — TIZANIDINE 4 MG/1
4 TABLET ORAL 4 TIMES DAILY PRN
Qty: 40 TABLET | Refills: 0 | Status: SHIPPED | OUTPATIENT
Start: 2019-11-08 | End: 2019-12-27

## 2019-11-08 RX ORDER — PREDNISONE 20 MG/1
20 TABLET ORAL 2 TIMES DAILY
Qty: 10 TABLET | Refills: 0 | Status: SHIPPED | OUTPATIENT
Start: 2019-11-08 | End: 2019-11-13

## 2019-11-08 RX ORDER — ACETAMINOPHEN 500 MG
1000 TABLET ORAL DAILY
COMMUNITY
End: 2020-08-03

## 2019-11-08 ASSESSMENT — ENCOUNTER SYMPTOMS: BACK PAIN: 1

## 2019-11-15 ENCOUNTER — TELEPHONE (OUTPATIENT)
Dept: FAMILY MEDICINE CLINIC | Age: 79
End: 2019-11-15

## 2019-11-15 RX ORDER — PREDNISONE 10 MG/1
10 TABLET ORAL 2 TIMES DAILY
Qty: 10 TABLET | Refills: 0 | Status: SHIPPED | OUTPATIENT
Start: 2019-11-15 | End: 2019-11-20

## 2019-11-18 ENCOUNTER — TELEPHONE (OUTPATIENT)
Dept: FAMILY MEDICINE CLINIC | Age: 79
End: 2019-11-18

## 2019-11-19 ENCOUNTER — OFFICE VISIT (OUTPATIENT)
Dept: FAMILY MEDICINE CLINIC | Age: 79
End: 2019-11-19
Payer: MEDICARE

## 2019-11-19 VITALS
OXYGEN SATURATION: 98 % | BODY MASS INDEX: 27.6 KG/M2 | WEIGHT: 171 LBS | SYSTOLIC BLOOD PRESSURE: 138 MMHG | HEART RATE: 70 BPM | DIASTOLIC BLOOD PRESSURE: 70 MMHG

## 2019-11-19 DIAGNOSIS — M54.42 CHRONIC BILATERAL LOW BACK PAIN WITH LEFT-SIDED SCIATICA: Primary | ICD-10-CM

## 2019-11-19 DIAGNOSIS — G89.29 CHRONIC BILATERAL LOW BACK PAIN WITH LEFT-SIDED SCIATICA: Primary | ICD-10-CM

## 2019-11-19 PROCEDURE — 99213 OFFICE O/P EST LOW 20 MIN: CPT | Performed by: NURSE PRACTITIONER

## 2019-11-19 RX ORDER — GABAPENTIN 300 MG/1
300 CAPSULE ORAL NIGHTLY
Qty: 30 CAPSULE | Refills: 0 | Status: SHIPPED | OUTPATIENT
Start: 2019-11-19 | End: 2019-12-27 | Stop reason: SDUPTHER

## 2019-11-19 ASSESSMENT — ENCOUNTER SYMPTOMS: BACK PAIN: 1

## 2019-12-27 ENCOUNTER — OFFICE VISIT (OUTPATIENT)
Dept: FAMILY MEDICINE CLINIC | Age: 79
End: 2019-12-27
Payer: MEDICARE

## 2019-12-27 VITALS
BODY MASS INDEX: 27.32 KG/M2 | SYSTOLIC BLOOD PRESSURE: 122 MMHG | RESPIRATION RATE: 16 BRPM | HEIGHT: 66 IN | WEIGHT: 170 LBS | HEART RATE: 71 BPM | DIASTOLIC BLOOD PRESSURE: 84 MMHG

## 2019-12-27 DIAGNOSIS — R51.9 UNILATERAL HEADACHE: ICD-10-CM

## 2019-12-27 DIAGNOSIS — N39.3 STRESS INCONTINENCE IN FEMALE: ICD-10-CM

## 2019-12-27 DIAGNOSIS — E78.5 HYPERLIPIDEMIA LDL GOAL <100: ICD-10-CM

## 2019-12-27 DIAGNOSIS — G89.29 CHRONIC BILATERAL LOW BACK PAIN WITH LEFT-SIDED SCIATICA: ICD-10-CM

## 2019-12-27 DIAGNOSIS — M54.42 CHRONIC BILATERAL LOW BACK PAIN WITH LEFT-SIDED SCIATICA: ICD-10-CM

## 2019-12-27 DIAGNOSIS — E11.9 TYPE 2 DIABETES MELLITUS WITHOUT COMPLICATION, WITHOUT LONG-TERM CURRENT USE OF INSULIN (HCC): Primary | Chronic | ICD-10-CM

## 2019-12-27 LAB — HBA1C MFR BLD: 7.8 %

## 2019-12-27 PROCEDURE — 83036 HEMOGLOBIN GLYCOSYLATED A1C: CPT | Performed by: FAMILY MEDICINE

## 2019-12-27 PROCEDURE — 99214 OFFICE O/P EST MOD 30 MIN: CPT | Performed by: FAMILY MEDICINE

## 2019-12-27 RX ORDER — SOLIFENACIN SUCCINATE 10 MG/1
10 TABLET, FILM COATED ORAL DAILY
Qty: 30 TABLET | Refills: 5 | Status: SHIPPED | OUTPATIENT
Start: 2019-12-27 | End: 2020-02-05

## 2019-12-27 RX ORDER — ATORVASTATIN CALCIUM 80 MG/1
80 TABLET, FILM COATED ORAL DAILY
Qty: 90 TABLET | Refills: 3 | Status: SHIPPED | OUTPATIENT
Start: 2019-12-27 | End: 2020-12-30 | Stop reason: SDUPTHER

## 2019-12-27 RX ORDER — GABAPENTIN 300 MG/1
300 CAPSULE ORAL NIGHTLY
Qty: 30 CAPSULE | Refills: 5 | Status: SHIPPED | OUTPATIENT
Start: 2019-12-27 | End: 2020-05-07 | Stop reason: ALTCHOICE

## 2019-12-30 DIAGNOSIS — E11.9 TYPE 2 DIABETES MELLITUS WITHOUT COMPLICATION, WITHOUT LONG-TERM CURRENT USE OF INSULIN (HCC): Chronic | ICD-10-CM

## 2019-12-30 DIAGNOSIS — E78.5 HYPERLIPIDEMIA LDL GOAL <100: ICD-10-CM

## 2019-12-30 LAB
A/G RATIO: 2.1 (ref 1.1–2.2)
ALBUMIN SERPL-MCNC: 4.1 G/DL (ref 3.4–5)
ALP BLD-CCNC: 93 U/L (ref 40–129)
ALT SERPL-CCNC: 15 U/L (ref 10–40)
ANION GAP SERPL CALCULATED.3IONS-SCNC: 16 MMOL/L (ref 3–16)
AST SERPL-CCNC: 16 U/L (ref 15–37)
BILIRUB SERPL-MCNC: 1 MG/DL (ref 0–1)
BUN BLDV-MCNC: 21 MG/DL (ref 7–20)
CALCIUM SERPL-MCNC: 9.7 MG/DL (ref 8.3–10.6)
CHLORIDE BLD-SCNC: 105 MMOL/L (ref 99–110)
CHOLESTEROL, TOTAL: 156 MG/DL (ref 0–199)
CO2: 23 MMOL/L (ref 21–32)
CREAT SERPL-MCNC: 0.7 MG/DL (ref 0.6–1.2)
GFR AFRICAN AMERICAN: >60
GFR NON-AFRICAN AMERICAN: >60
GLOBULIN: 2 G/DL
GLUCOSE BLD-MCNC: 150 MG/DL (ref 70–99)
HDLC SERPL-MCNC: 90 MG/DL (ref 40–60)
LDL CHOLESTEROL CALCULATED: 54 MG/DL
POTASSIUM SERPL-SCNC: 4.5 MMOL/L (ref 3.5–5.1)
SODIUM BLD-SCNC: 144 MMOL/L (ref 136–145)
TOTAL PROTEIN: 6.1 G/DL (ref 6.4–8.2)
TRIGL SERPL-MCNC: 58 MG/DL (ref 0–150)
VLDLC SERPL CALC-MCNC: 12 MG/DL

## 2020-01-10 RX ORDER — METFORMIN HYDROCHLORIDE 500 MG/1
1000 TABLET, EXTENDED RELEASE ORAL 2 TIMES DAILY
Qty: 360 TABLET | Refills: 0 | Status: SHIPPED | OUTPATIENT
Start: 2020-01-10 | End: 2020-04-08 | Stop reason: SDUPTHER

## 2020-02-03 ENCOUNTER — PATIENT MESSAGE (OUTPATIENT)
Dept: FAMILY MEDICINE CLINIC | Age: 80
End: 2020-02-03

## 2020-02-04 NOTE — TELEPHONE ENCOUNTER
From: Evan Pionn  To: Jayant Braun MD  Sent: 2/3/2020 6:23 PM EST  Subject: Prescription Question    [employer] notified me that solifenacin is not on their formulary BUT can try oxybutynin ir-er /Tovias/myrbetriq/vesicare/trospium ir/er. Hope I copied that right.

## 2020-02-05 RX ORDER — FESOTERODINE FUMARATE 8 MG/1
8 TABLET, EXTENDED RELEASE ORAL DAILY
Qty: 30 TABLET | Refills: 5 | Status: SHIPPED | OUTPATIENT
Start: 2020-02-05 | End: 2020-09-11 | Stop reason: SDUPTHER

## 2020-04-08 RX ORDER — METFORMIN HYDROCHLORIDE 500 MG/1
1000 TABLET, EXTENDED RELEASE ORAL 2 TIMES DAILY
Qty: 360 TABLET | Refills: 0 | Status: SHIPPED | OUTPATIENT
Start: 2020-04-08 | End: 2020-07-05 | Stop reason: SDUPTHER

## 2020-05-04 ENCOUNTER — VIRTUAL VISIT (OUTPATIENT)
Dept: PULMONOLOGY | Age: 80
End: 2020-05-04
Payer: MEDICARE

## 2020-05-04 PROCEDURE — 99213 OFFICE O/P EST LOW 20 MIN: CPT | Performed by: INTERNAL MEDICINE

## 2020-05-04 ASSESSMENT — SLEEP AND FATIGUE QUESTIONNAIRES
ESS TOTAL SCORE: 3
HOW LIKELY ARE YOU TO NOD OFF OR FALL ASLEEP IN A CAR, WHILE STOPPED FOR A FEW MINUTES IN TRAFFIC: 0
HOW LIKELY ARE YOU TO NOD OFF OR FALL ASLEEP WHILE SITTING AND READING: 2
HOW LIKELY ARE YOU TO NOD OFF OR FALL ASLEEP WHILE LYING DOWN TO REST IN THE AFTERNOON WHEN CIRCUMSTANCES PERMIT: 1
HOW LIKELY ARE YOU TO NOD OFF OR FALL ASLEEP WHILE SITTING QUIETLY AFTER LUNCH WITHOUT ALCOHOL: 0
HOW LIKELY ARE YOU TO NOD OFF OR FALL ASLEEP WHEN YOU ARE A PASSENGER IN A CAR FOR AN HOUR WITHOUT A BREAK: 0
HOW LIKELY ARE YOU TO NOD OFF OR FALL ASLEEP WHILE SITTING AND TALKING TO SOMEONE: 0
HOW LIKELY ARE YOU TO NOD OFF OR FALL ASLEEP WHILE WATCHING TV: 0
HOW LIKELY ARE YOU TO NOD OFF OR FALL ASLEEP WHILE SITTING INACTIVE IN A PUBLIC PLACE: 0

## 2020-07-06 RX ORDER — METFORMIN HYDROCHLORIDE 500 MG/1
1000 TABLET, EXTENDED RELEASE ORAL 2 TIMES DAILY
Qty: 360 TABLET | Refills: 0 | Status: SHIPPED | OUTPATIENT
Start: 2020-07-06 | End: 2020-10-05 | Stop reason: SDUPTHER

## 2020-08-03 ENCOUNTER — HOSPITAL ENCOUNTER (OUTPATIENT)
Dept: GENERAL RADIOLOGY | Age: 80
Discharge: HOME | End: 2020-08-03
Payer: MEDICARE

## 2020-08-03 ENCOUNTER — HOSPITAL ENCOUNTER (OUTPATIENT)
Age: 80
Discharge: HOME | End: 2020-08-03
Payer: MEDICARE

## 2020-08-03 ENCOUNTER — OFFICE VISIT (OUTPATIENT)
Dept: FAMILY MEDICINE CLINIC | Age: 80
End: 2020-08-03
Payer: MEDICARE

## 2020-08-03 VITALS
WEIGHT: 164 LBS | RESPIRATION RATE: 16 BRPM | HEIGHT: 66 IN | SYSTOLIC BLOOD PRESSURE: 120 MMHG | OXYGEN SATURATION: 98 % | DIASTOLIC BLOOD PRESSURE: 64 MMHG | BODY MASS INDEX: 26.36 KG/M2 | HEART RATE: 82 BPM | TEMPERATURE: 97.2 F

## 2020-08-03 LAB
ANION GAP SERPL CALCULATED.3IONS-SCNC: 17 MMOL/L (ref 3–16)
BASOPHILS ABSOLUTE: 0 K/UL (ref 0–0.2)
BASOPHILS RELATIVE PERCENT: 0.7 %
BUN BLDV-MCNC: 14 MG/DL (ref 7–20)
CALCIUM SERPL-MCNC: 9.4 MG/DL (ref 8.3–10.6)
CHLORIDE BLD-SCNC: 107 MMOL/L (ref 99–110)
CO2: 23 MMOL/L (ref 21–32)
CREAT SERPL-MCNC: 0.6 MG/DL (ref 0.6–1.2)
EOSINOPHILS ABSOLUTE: 0.2 K/UL (ref 0–0.6)
EOSINOPHILS RELATIVE PERCENT: 3.3 %
GFR AFRICAN AMERICAN: >60
GFR NON-AFRICAN AMERICAN: >60
GLUCOSE BLD-MCNC: 182 MG/DL (ref 70–99)
HCT VFR BLD CALC: 30.3 % (ref 36–48)
HEMOGLOBIN: 9.7 G/DL (ref 12–16)
LYMPHOCYTES ABSOLUTE: 0.7 K/UL (ref 1–5.1)
LYMPHOCYTES RELATIVE PERCENT: 15.4 %
MCH RBC QN AUTO: 26.5 PG (ref 26–34)
MCHC RBC AUTO-ENTMCNC: 32 G/DL (ref 31–36)
MCV RBC AUTO: 83 FL (ref 80–100)
MONOCYTES ABSOLUTE: 0.3 K/UL (ref 0–1.3)
MONOCYTES RELATIVE PERCENT: 7.5 %
NEUTROPHILS ABSOLUTE: 3.3 K/UL (ref 1.7–7.7)
NEUTROPHILS RELATIVE PERCENT: 73.1 %
PDW BLD-RTO: 18.7 % (ref 12.4–15.4)
PLATELET # BLD: 354 K/UL (ref 135–450)
PMV BLD AUTO: 7.7 FL (ref 5–10.5)
POTASSIUM SERPL-SCNC: 4.4 MMOL/L (ref 3.5–5.1)
RBC # BLD: 3.65 M/UL (ref 4–5.2)
SODIUM BLD-SCNC: 147 MMOL/L (ref 136–145)
TSH SERPL DL<=0.05 MIU/L-ACNC: 1.12 UIU/ML (ref 0.27–4.2)
WBC # BLD: 4.6 K/UL (ref 4–11)

## 2020-08-03 PROCEDURE — G8510 SCR DEP NEG, NO PLAN REQD: HCPCS | Performed by: FAMILY MEDICINE

## 2020-08-03 PROCEDURE — 93000 ELECTROCARDIOGRAM COMPLETE: CPT | Performed by: FAMILY MEDICINE

## 2020-08-03 PROCEDURE — 71046 X-RAY EXAM CHEST 2 VIEWS: CPT

## 2020-08-03 PROCEDURE — 99214 OFFICE O/P EST MOD 30 MIN: CPT | Performed by: FAMILY MEDICINE

## 2020-08-03 PROCEDURE — 3288F FALL RISK ASSESSMENT DOCD: CPT | Performed by: FAMILY MEDICINE

## 2020-08-04 LAB
ESTIMATED AVERAGE GLUCOSE: 148.5 MG/DL
HBA1C MFR BLD: 6.8 %

## 2020-08-11 DIAGNOSIS — D64.9 ANEMIA, UNSPECIFIED TYPE: ICD-10-CM

## 2020-08-11 LAB
FERRITIN: 7.9 NG/ML (ref 15–150)
FOLATE: 19.47 NG/ML (ref 4.78–24.2)
HCT VFR BLD CALC: 29.6 % (ref 36–48)
HEMOGLOBIN: 9.4 G/DL (ref 12–16)
IMMATURE RETIC FRACT: 0.47 (ref 0.21–0.37)
IRON SATURATION: 7 % (ref 15–50)
IRON: 28 UG/DL (ref 37–145)
RETICULOCYTE ABSOLUTE COUNT: 0.06 M/UL (ref 0.02–0.1)
RETICULOCYTE COUNT PCT: 1.57 % (ref 0.5–2.18)
TOTAL IRON BINDING CAPACITY: 421 UG/DL (ref 260–445)
VITAMIN B-12: 252 PG/ML (ref 211–911)

## 2020-08-12 ENCOUNTER — PATIENT MESSAGE (OUTPATIENT)
Dept: FAMILY MEDICINE CLINIC | Age: 80
End: 2020-08-12

## 2020-08-12 NOTE — TELEPHONE ENCOUNTER
From: Norma Moore  To: Ranulfo Tony MD  Sent: 8/12/2020 1:52 PM EDT  Subject: Non-Urgent Medical Question    In the past week or so I have noticed several dry brown patches on my torso both front and back. It has been some time since I saw a dermatologist and even then, each time was a different doctor. Do you haveed a suggestions? Also, what do the new blood test results mean?

## 2020-08-13 RX ORDER — PREDNISONE 20 MG/1
20 TABLET ORAL 2 TIMES DAILY
Qty: 10 TABLET | Refills: 0 | Status: SHIPPED | OUTPATIENT
Start: 2020-08-13 | End: 2020-08-18

## 2020-08-13 RX ORDER — FERROUS SULFATE 325(65) MG
325 TABLET ORAL 2 TIMES DAILY
Qty: 180 TABLET | Refills: 1 | Status: SHIPPED | OUTPATIENT
Start: 2020-08-13 | End: 2020-09-06 | Stop reason: SDUPTHER

## 2020-08-19 ENCOUNTER — PATIENT MESSAGE (OUTPATIENT)
Dept: FAMILY MEDICINE CLINIC | Age: 80
End: 2020-08-19

## 2020-08-19 NOTE — TELEPHONE ENCOUNTER
From: Anil Garcia  To: Ben Evans MD  Sent: 8/19/2020 10:36 AM EDT  Subject: Prescription Question    forgot to ask what the prednisone is for.

## 2020-09-04 ENCOUNTER — PATIENT MESSAGE (OUTPATIENT)
Dept: FAMILY MEDICINE CLINIC | Age: 80
End: 2020-09-04

## 2020-09-04 NOTE — TELEPHONE ENCOUNTER
From: Dino Officer  To: Dong Armas MD  Sent: 9/4/2020 9:19 AM EDT  Subject: Test Results Question    Since results from Dr. Sandra Pearce were normal including the biopsy what is our next step? Why OJ with the iron pills? I keep forgetting to take the second one!

## 2020-09-08 RX ORDER — FERROUS SULFATE 325(65) MG
325 TABLET ORAL 2 TIMES DAILY
Qty: 180 TABLET | Refills: 1 | Status: SHIPPED | OUTPATIENT
Start: 2020-09-08 | End: 2020-10-21

## 2020-09-22 ENCOUNTER — PATIENT MESSAGE (OUTPATIENT)
Dept: FAMILY MEDICINE CLINIC | Age: 80
End: 2020-09-22

## 2020-10-05 RX ORDER — METFORMIN HYDROCHLORIDE 500 MG/1
1000 TABLET, EXTENDED RELEASE ORAL 2 TIMES DAILY
Qty: 360 TABLET | Refills: 0 | Status: SHIPPED | OUTPATIENT
Start: 2020-10-05 | End: 2021-01-03 | Stop reason: SDUPTHER

## 2020-10-13 ENCOUNTER — PATIENT MESSAGE (OUTPATIENT)
Dept: FAMILY MEDICINE CLINIC | Age: 80
End: 2020-10-13

## 2020-10-13 NOTE — TELEPHONE ENCOUNTER
From: Reuben Kawasaki  To: Michelle Toure MD  Sent: 10/13/2020 11:34 AM EDT  Subject: Visit Follow-Up Question    I am so sorry I forgot yesterday's appointment. I had a really bad night Sunday and a problem to solve Monday morning. Since the main purpose of the visit was a follow up on the anemia can we do this. ...get a new blood draw from the lab and after viewing the results decide on a plan? I have been taking the iron prescribed (much stronger than the former OTC I had) with OJ in the morning and just water at night.

## 2020-10-15 DIAGNOSIS — D50.9 IRON DEFICIENCY ANEMIA, UNSPECIFIED IRON DEFICIENCY ANEMIA TYPE: ICD-10-CM

## 2020-10-15 LAB
BASOPHILS ABSOLUTE: 0 K/UL (ref 0–0.2)
BASOPHILS RELATIVE PERCENT: 0.8 %
EOSINOPHILS ABSOLUTE: 0.1 K/UL (ref 0–0.6)
EOSINOPHILS RELATIVE PERCENT: 2.6 %
HCT VFR BLD CALC: 39.3 % (ref 36–48)
HEMOGLOBIN: 12.6 G/DL (ref 12–16)
LYMPHOCYTES ABSOLUTE: 0.9 K/UL (ref 1–5.1)
LYMPHOCYTES RELATIVE PERCENT: 21 %
MCH RBC QN AUTO: 27.5 PG (ref 26–34)
MCHC RBC AUTO-ENTMCNC: 32 G/DL (ref 31–36)
MCV RBC AUTO: 85.7 FL (ref 80–100)
MONOCYTES ABSOLUTE: 0.3 K/UL (ref 0–1.3)
MONOCYTES RELATIVE PERCENT: 6.6 %
NEUTROPHILS ABSOLUTE: 3.1 K/UL (ref 1.7–7.7)
NEUTROPHILS RELATIVE PERCENT: 69 %
PDW BLD-RTO: 21.4 % (ref 12.4–15.4)
PLATELET # BLD: 260 K/UL (ref 135–450)
PMV BLD AUTO: 8.1 FL (ref 5–10.5)
RBC # BLD: 4.58 M/UL (ref 4–5.2)
WBC # BLD: 4.5 K/UL (ref 4–11)

## 2020-10-21 RX ORDER — FERROUS SULFATE 325(65) MG
325 TABLET ORAL
Qty: 180 TABLET | Refills: 1
Start: 2020-10-21 | End: 2021-10-25 | Stop reason: SDUPTHER

## 2020-11-07 ENCOUNTER — PATIENT MESSAGE (OUTPATIENT)
Dept: FAMILY MEDICINE CLINIC | Age: 80
End: 2020-11-07

## 2020-11-09 ENCOUNTER — VIRTUAL VISIT (OUTPATIENT)
Dept: FAMILY MEDICINE CLINIC | Age: 80
End: 2020-11-09
Payer: MEDICARE

## 2020-11-09 PROCEDURE — 99213 OFFICE O/P EST LOW 20 MIN: CPT | Performed by: FAMILY MEDICINE

## 2020-11-09 NOTE — PROGRESS NOTES
TELEHEALTH EVALUATION -- Audio/Visual (During XFODH-72 public health emergency)  VIDEO VISIT- patient and provider not at same location  Also present:none. PROBLEM VISIT NOTE     Subjective:     Chief Complaint   Patient presents with    Diarrhea     Eulogio Jones is a 79 y.o. female   who presents diarrhea, pt states last December she had this issue you said she should start using a probiotic,  It's off and on and she can go a couple of months and be fine but she said when it comes it is really really ugly and has no forewarning and it is completely out of control. No cramps nothing   Duration a while   Tried kaopectate     Been having these episodes every few months, no blood, no fever. No BM x 4 d. No pain. Had colon couple months ago. Patient's medications, allergies, past medical, and social histories were reviewed and updated as appropriate (see below). Review of Systems   General ROS: fever? No,    Night sweats? No  Endocrine ROS:malaise/lethargy? No   Unexpected weight changes? No  Respiratory ROS: cough? No   Shortness of breath? No  Cardiovascular ROS:chest pain? No   Shortness of breath with exertion? No  Gastrointestinal ROS: abdominal pain? No   Change in stools? Yes   Genito-Urinary ROS: painful urination? No   Trouble voiding? No  Neurological ROS: TIA or stroke symptoms? No   Numbness/tingling in feet?  No  Health Maintenance Due   Topic Date Due    Annual Wellness Visit (AWV)  05/29/2019     *Chief complaint, HPI, History and ROS provided by the medical assistant has been reviewed and verified by provider- Oscar Feliciano MD    CHART REVIEW  Health Maintenance   Topic Date Due    Annual Wellness Visit (AWV)  05/29/2019    Diabetic foot exam  12/27/2020    Lipid screen  12/30/2020    A1C test (Diabetic or Prediabetic)  02/03/2021    Diabetic retinal exam  07/08/2021    DTaP/Tdap/Td vaccine (3 - Td) 06/11/2026    DEXA (modify frequency per FRAX score)  Completed    Flu vaccine Completed    Shingles Vaccine  Completed    Pneumococcal 65+ years Vaccine  Completed    Hepatitis A vaccine  Aged Out    Hib vaccine  Aged Out    Meningococcal (ACWY) vaccine  Aged Out     The ASCVD Risk score (Mercedez Grimes., et al., 2013) failed to calculate for the following reasons: The 2013 ASCVD risk score is only valid for ages 36 to 78  Prior to Visit Medications    Medication Sig Taking? Authorizing Provider   ferrous sulfate (IRON 325) 325 (65 Fe) MG tablet Take 1 tablet by mouth daily (with breakfast) Yes Aureliano Mcmahan MD   metFORMIN (GLUCOPHAGE-XR) 500 MG extended release tablet Take 2 tablets by mouth 2 times daily Yes Aureliano Mcmahan MD   Fesoterodine Fumarate ER 8 MG TB24 Take 8 mg by mouth daily Yes Aureliano Mcmahan MD   atorvastatin (LIPITOR) 80 MG tablet Take 1 tablet by mouth daily Yes Aureliano Mcmahan MD   naproxen sodium (ALEVE) 220 MG tablet Take 550 mg by mouth 2 times daily (with meals) Yes Historical Provider, MD   CPAP Machine MISC by Does not apply route Yes Historical Provider, MD   solifenacin (VESICARE) 10 MG tablet Take 1 tablet by mouth daily. Aureliano Mcmahan MD      Family History   Problem Relation Age of Onset    Diabetes Mother     Cancer Mother         SCC, breast, colon polyp    Heart Disease Father     Heart Disease Brother     Cancer Brother         lung, liver     Social History     Tobacco Use    Smoking status: Never Smoker    Smokeless tobacco: Never Used    Tobacco comment: Advised not to start. Substance Use Topics    Alcohol use:  Yes     Alcohol/week: 0.0 standard drinks     Comment: light    Drug use: No      LAST LABS  Cholesterol, Total   Date Value Ref Range Status   12/30/2019 156 0 - 199 mg/dL Final     LDL Calculated   Date Value Ref Range Status   12/30/2019 54 <100 mg/dL Final     HDL   Date Value Ref Range Status   12/30/2019 90 (H) 40 - 60 mg/dL Final   10/19/2011 72 (H) 40 - 60 mg/dl Final     Triglycerides   Date Value Ref Range Status   12/30/2019 58 0 - 150 mg/dL Final     Lab Results   Component Value Date    GLUCOSE 182 (H) 08/03/2020     Lab Results   Component Value Date     (H) 08/03/2020    K 4.4 08/03/2020    CREATININE 0.6 08/03/2020     Lab Results   Component Value Date    WBC 4.5 10/15/2020    HGB 12.6 10/15/2020    HCT 39.3 10/15/2020    MCV 85.7 10/15/2020     10/15/2020     Lab Results   Component Value Date    ALT 15 12/30/2019    AST 16 12/30/2019    ALKPHOS 93 12/30/2019    BILITOT 1.0 12/30/2019     TSH (uIU/mL)   Date Value   08/03/2020 1.12     Lab Results   Component Value Date    LABA1C 6.8 08/03/2020      Objective:   PHYSICAL EXAM:  There were no vitals taken for this visit. BP Readings from Last 5 Encounters:   08/03/20 120/64   12/27/19 122/84   11/19/19 138/70   11/08/19 (!) 136/94   09/25/19 128/74     Wt Readings from Last 5 Encounters:   08/03/20 164 lb (74.4 kg)   12/27/19 170 lb (77.1 kg)   11/19/19 171 lb (77.6 kg)   11/08/19 172 lb (78 kg)   09/25/19 167 lb (75.8 kg)      GENERAL:   · well-developed, well-nourished, alert, no distress. EYES:   · External findings: lids and lashes normal and conjunctivae and sclerae normal  · Eyes: no periorbital cellulitis. HENT:   · Normocephalic, atraumatic  · External nose and ears appear normal  · Mucous membranes are moist  · Hearing grossly normal.     NECK: No visible masses  LUNGS:    · Respiratory effort normal.  · No visualized signs of difficulty breathing or respiratory distress  SKIN: warm and dry  · No significant exanthematous lesions or discoloration noted on facial skin  PSYCH:    · Alert and oriented, able to follow commands  · Normal reasoning, insight good  · Normal affect  · No memory disturbance noted  NEURO:   No Facial Asymmetry (Cranial nerve 7 motor function) (limited exam to video visit)      No gaze palsy      Assessment and Plan:      Diagnosis Orders   1. Diarrhea, unspecified type     Plan below.   INSTRUCTIONS  · NEXT APPOINTMENT: Please

## 2020-11-09 NOTE — TELEPHONE ENCOUNTER
From: Nakul Davis  To: Balta Mendoza MD  Sent: 11/7/2020 12:50 PM EST  Subject: Non-Urgent Medical Question    I had another awful night of totally uncontrolable diahrea Thursday night necessitating multiple changes of underwear and PJ bottoms, a sower and laundry at [address] and a lost Friday because I was so sleep deprived and wiped out, afraid to eat. It was as bad as the prescribed stuff prior to the colonoscopy! I am taking an OTC probiotic every day and cannot pinpoint anything I ate or drank. Something is definitely going on as this has happened before. It also makes me wonder what Maverick Merlos found that required a biopsy even though it was \"normal\".

## 2020-12-23 ENCOUNTER — OFFICE VISIT (OUTPATIENT)
Dept: FAMILY MEDICINE CLINIC | Age: 80
End: 2020-12-23
Payer: MEDICARE

## 2020-12-23 VITALS
TEMPERATURE: 97 F | BODY MASS INDEX: 26.84 KG/M2 | HEIGHT: 66 IN | RESPIRATION RATE: 16 BRPM | WEIGHT: 167 LBS | SYSTOLIC BLOOD PRESSURE: 104 MMHG | OXYGEN SATURATION: 98 % | HEART RATE: 93 BPM | DIASTOLIC BLOOD PRESSURE: 68 MMHG

## 2020-12-23 PROCEDURE — 99214 OFFICE O/P EST MOD 30 MIN: CPT | Performed by: FAMILY MEDICINE

## 2020-12-23 NOTE — PATIENT INSTRUCTIONS
MA: Please recheck blood pressure. Let me know if > 140/90 BEFORE they leave. Thanks! INSTRUCTIONS  NEXT APPOINTMENT: Please schedule annual complete physical (30 minutes) in 3 months. · PLEASE TAKE THIS FORM TO CHECK-OUT WINDOW TO SCHEDULE NEXT VISIT. PLEASE GET FASTING BLOODWORK DRAWN SOON. Lab is on first floor in [address].  Hours Monday to Friday 7 AM to 5 PM.

## 2020-12-29 DIAGNOSIS — E11.9 TYPE 2 DIABETES MELLITUS WITHOUT COMPLICATION, WITHOUT LONG-TERM CURRENT USE OF INSULIN (HCC): Chronic | ICD-10-CM

## 2020-12-29 DIAGNOSIS — E78.5 HYPERLIPIDEMIA LDL GOAL <100: Chronic | ICD-10-CM

## 2020-12-29 LAB
A/G RATIO: 2.3 (ref 1.1–2.2)
ALBUMIN SERPL-MCNC: 4.3 G/DL (ref 3.4–5)
ALP BLD-CCNC: 101 U/L (ref 40–129)
ALT SERPL-CCNC: 14 U/L (ref 10–40)
ANION GAP SERPL CALCULATED.3IONS-SCNC: 10 MMOL/L (ref 3–16)
AST SERPL-CCNC: 16 U/L (ref 15–37)
BILIRUB SERPL-MCNC: 0.9 MG/DL (ref 0–1)
BUN BLDV-MCNC: 17 MG/DL (ref 7–20)
CALCIUM SERPL-MCNC: 9.5 MG/DL (ref 8.3–10.6)
CHLORIDE BLD-SCNC: 105 MMOL/L (ref 99–110)
CHOLESTEROL, TOTAL: 147 MG/DL (ref 0–199)
CO2: 26 MMOL/L (ref 21–32)
CREAT SERPL-MCNC: 0.7 MG/DL (ref 0.6–1.2)
GFR AFRICAN AMERICAN: >60
GFR NON-AFRICAN AMERICAN: >60
GLOBULIN: 1.9 G/DL
GLUCOSE BLD-MCNC: 164 MG/DL (ref 70–99)
HDLC SERPL-MCNC: 73 MG/DL (ref 40–60)
LDL CHOLESTEROL CALCULATED: 58 MG/DL
POTASSIUM SERPL-SCNC: 4.2 MMOL/L (ref 3.5–5.1)
SODIUM BLD-SCNC: 141 MMOL/L (ref 136–145)
TOTAL PROTEIN: 6.2 G/DL (ref 6.4–8.2)
TRIGL SERPL-MCNC: 80 MG/DL (ref 0–150)
VLDLC SERPL CALC-MCNC: 16 MG/DL

## 2020-12-30 LAB
ESTIMATED AVERAGE GLUCOSE: 157.1 MG/DL
HBA1C MFR BLD: 7.1 %

## 2020-12-30 RX ORDER — ATORVASTATIN CALCIUM 80 MG/1
80 TABLET, FILM COATED ORAL DAILY
Qty: 90 TABLET | Refills: 1 | Status: SHIPPED | OUTPATIENT
Start: 2020-12-30 | End: 2021-07-23 | Stop reason: SDUPTHER

## 2021-01-03 DIAGNOSIS — E11.9 TYPE 2 DIABETES MELLITUS WITHOUT COMPLICATION, WITHOUT LONG-TERM CURRENT USE OF INSULIN (HCC): ICD-10-CM

## 2021-01-04 RX ORDER — METFORMIN HYDROCHLORIDE 500 MG/1
1000 TABLET, EXTENDED RELEASE ORAL 2 TIMES DAILY
Qty: 360 TABLET | Refills: 0 | Status: SHIPPED | OUTPATIENT
Start: 2021-01-04 | End: 2021-04-07 | Stop reason: SDUPTHER

## 2021-01-20 ENCOUNTER — PATIENT MESSAGE (OUTPATIENT)
Dept: FAMILY MEDICINE CLINIC | Age: 81
End: 2021-01-20

## 2021-01-20 ENCOUNTER — IMMUNIZATION (OUTPATIENT)
Dept: PRIMARY CARE CLINIC | Age: 81
End: 2021-01-20
Payer: MEDICARE

## 2021-01-20 PROCEDURE — 91300 COVID-19, PFIZER VACCINE 30MCG/0.3ML DOSE: CPT | Performed by: FAMILY MEDICINE

## 2021-01-20 PROCEDURE — 0001A COVID-19, PFIZER VACCINE 30MCG/0.3ML DOSE: CPT | Performed by: FAMILY MEDICINE

## 2021-01-20 NOTE — TELEPHONE ENCOUNTER
From: Costa Julian  To: Andria Chau MD  Sent: 1/20/2021 11:47 AM EST  Subject: Non-Urgent Medical Question    Saw Latisha Taylormanny this morning. He suggested daily Kefir, a liquid yogurt-like found at [facility], and [place] every day. I will take these two new things instead of the OTC probiotics. I am to try these for several weeks and let him know of any changes. He thinks they may also help the acid reflux, etc. that has cropped up recently. The Rx ferrous sulfate is working well as I donated blood last week. Thanks for thee call to make sure I got on the schedule for Covid vaccination later today. Also make a note on my chart that I am no longer taking Toviaz. When the monthly cost went from $47 to over $300 I decided I could do nicely without it.

## 2021-02-10 ENCOUNTER — IMMUNIZATION (OUTPATIENT)
Dept: PRIMARY CARE CLINIC | Age: 81
End: 2021-02-10
Payer: MEDICARE

## 2021-02-10 PROCEDURE — 91300 COVID-19, PFIZER VACCINE 30MCG/0.3ML DOSE: CPT | Performed by: FAMILY MEDICINE

## 2021-02-10 PROCEDURE — 0002A COVID-19, PFIZER VACCINE 30MCG/0.3ML DOSE: CPT | Performed by: FAMILY MEDICINE

## 2021-03-24 ENCOUNTER — OFFICE VISIT (OUTPATIENT)
Dept: FAMILY MEDICINE CLINIC | Age: 81
End: 2021-03-24
Payer: MEDICARE

## 2021-03-24 VITALS
HEART RATE: 72 BPM | BODY MASS INDEX: 26.84 KG/M2 | HEIGHT: 66 IN | RESPIRATION RATE: 16 BRPM | DIASTOLIC BLOOD PRESSURE: 84 MMHG | SYSTOLIC BLOOD PRESSURE: 138 MMHG | WEIGHT: 167 LBS | OXYGEN SATURATION: 94 % | TEMPERATURE: 97.2 F

## 2021-03-24 DIAGNOSIS — E78.5 HYPERLIPIDEMIA LDL GOAL <100: Chronic | ICD-10-CM

## 2021-03-24 DIAGNOSIS — K58.0 IRRITABLE BOWEL SYNDROME WITH DIARRHEA: ICD-10-CM

## 2021-03-24 DIAGNOSIS — Z00.00 ROUTINE GENERAL MEDICAL EXAMINATION AT A HEALTH CARE FACILITY: Primary | ICD-10-CM

## 2021-03-24 DIAGNOSIS — E11.65 UNCONTROLLED TYPE 2 DIABETES MELLITUS WITH HYPERGLYCEMIA (HCC): ICD-10-CM

## 2021-03-24 DIAGNOSIS — D50.9 IRON DEFICIENCY ANEMIA, UNSPECIFIED IRON DEFICIENCY ANEMIA TYPE: ICD-10-CM

## 2021-03-24 DIAGNOSIS — E11.9 TYPE 2 DIABETES MELLITUS WITHOUT COMPLICATION, WITHOUT LONG-TERM CURRENT USE OF INSULIN (HCC): Chronic | ICD-10-CM

## 2021-03-24 LAB — HBA1C MFR BLD: 7.3 %

## 2021-03-24 PROCEDURE — G0439 PPPS, SUBSEQ VISIT: HCPCS | Performed by: FAMILY MEDICINE

## 2021-03-24 PROCEDURE — 99214 OFFICE O/P EST MOD 30 MIN: CPT | Performed by: FAMILY MEDICINE

## 2021-03-24 PROCEDURE — 83036 HEMOGLOBIN GLYCOSYLATED A1C: CPT | Performed by: FAMILY MEDICINE

## 2021-03-24 SDOH — ECONOMIC STABILITY: INCOME INSECURITY: HOW HARD IS IT FOR YOU TO PAY FOR THE VERY BASICS LIKE FOOD, HOUSING, MEDICAL CARE, AND HEATING?: NOT HARD AT ALL

## 2021-03-24 SDOH — ECONOMIC STABILITY: FOOD INSECURITY: WITHIN THE PAST 12 MONTHS, YOU WORRIED THAT YOUR FOOD WOULD RUN OUT BEFORE YOU GOT MONEY TO BUY MORE.: PATIENT DECLINED

## 2021-03-24 ASSESSMENT — PATIENT HEALTH QUESTIONNAIRE - PHQ9
2. FEELING DOWN, DEPRESSED OR HOPELESS: 0
1. LITTLE INTEREST OR PLEASURE IN DOING THINGS: 0
SUM OF ALL RESPONSES TO PHQ9 QUESTIONS 1 & 2: 0
SUM OF ALL RESPONSES TO PHQ QUESTIONS 1-9: 0

## 2021-03-24 ASSESSMENT — LIFESTYLE VARIABLES
HOW MANY STANDARD DRINKS CONTAINING ALCOHOL DO YOU HAVE ON A TYPICAL DAY: 0
HOW OFTEN DURING THE LAST YEAR HAVE YOU FAILED TO DO WHAT WAS NORMALLY EXPECTED FROM YOU BECAUSE OF DRINKING: 0
AUDIT-C TOTAL SCORE: 4
HOW OFTEN DO YOU HAVE SIX OR MORE DRINKS ON ONE OCCASION: 0
HOW OFTEN DURING THE LAST YEAR HAVE YOU FOUND THAT YOU WERE NOT ABLE TO STOP DRINKING ONCE YOU HAD STARTED: 0
HAS A RELATIVE, FRIEND, DOCTOR, OR ANOTHER HEALTH PROFESSIONAL EXPRESSED CONCERN ABOUT YOUR DRINKING OR SUGGESTED YOU CUT DOWN: 0
HAVE YOU OR SOMEONE ELSE BEEN INJURED AS A RESULT OF YOUR DRINKING: 0

## 2021-03-24 NOTE — PROGRESS NOTES
Medicare Annual Wellness Visit  Name: Dariusz Grimes  YOB: 1940  Age: 79 y.o. Sex: female  MRN: <[id number]>     Date of Service:  3/24/2021    Chief Complaint:   Dariusz Grimes is a 79 y.o. female who presents for Medicare Annual Wellness Visit and check-up for:  1. Routine general medical examination at a health care facility    2. Type 2 diabetes mellitus without complication, without long-term current use of insulin ([address]    3. Hyperlipidemia LDL goal <100    4. Iron deficiency anemia, unspecified iron deficiency anemia type    5. Irritable bowel syndrome with diarrhea      HPI    Chief Complaint   Patient presents with    Medicare AWV   Complaints: pt is beginning to have some circulation problems from the arthritis. She does have tingling in her left fingers from time to time. And her right foot swells up every night. Takes aleve occasionally. Bowels doing good with benefiber. No more diarrhea. Review of Systems   General ROS: fever? No,    Night sweats? No  Ophthalmic ROS: change in vision? No  Endocrine ROS: fatigue? No   Unexpected weight changes? No  Hematologic/Lymphatic: easy bruising? No   Swollen lymph nodes? No  ENT ROS: headaches? No   Sore throat? No  Respiratory ROS: cough? No   Wheezing? No  Cardiovascular ROS: chest pain? No   Shortness of breath? No  Gastrointestinal ROS: abdominal pain? No   Change in stools? No  Genito-Urinary ROS: painful urination? No   Trouble urinating? No  Musculoskeletal ROS: trouble walking? No   Joint pain? Yes  Neurological ROS: TIA or stroke symptoms? No   Numbness/tingling? Yes  Dermatological ROS: rash? No   Changes in skin spots?   No    Health Maintenance Due   Topic Date Due    Annual Wellness Visit (AWV)  Never done    Diabetic foot exam  12/27/2020     *Chief complaint, HPI, History and ROS provided by the medical assistant has been reviewed and verified by provider- David Herman MD    HISTORY: Patient's medications, allergies, past medical, and social histories were reviewed and updated as appropriate. CHART REVIEW  Health Maintenance   Topic Date Due    Annual Wellness Visit (AWV)  Never done    Diabetic foot exam  12/27/2020    A1C test (Diabetic or Prediabetic)  06/29/2021    Lipid screen  12/29/2021    Diabetic retinal exam  03/18/2022    DTaP/Tdap/Td vaccine (3 - Td) 06/11/2026    DEXA (modify frequency per FRAX score)  Completed    Flu vaccine  Completed    Shingles Vaccine  Completed    Pneumococcal 65+ years Vaccine  Completed    COVID-19 Vaccine  Completed    Hepatitis A vaccine  Aged Out    Hib vaccine  Aged Out    Meningococcal (ACWY) vaccine  Aged Out     The ASCVD Risk score (Jennifer Abdi, et al., 2013) failed to calculate for the following reasons: The 2013 ASCVD risk score is only valid for ages 36 to 78  Prior to Visit Medications    Medication Sig Taking? Authorizing Provider   metFORMIN (GLUCOPHAGE-XR) 500 MG extended release tablet Take 2 tablets by mouth 2 times daily Yes Payam Borden MD   atorvastatin (LIPITOR) 80 MG tablet Take 1 tablet by mouth daily Yes Payam Borden MD   ferrous sulfate (IRON 325) 325 (65 Fe) MG tablet Take 1 tablet by mouth daily (with breakfast) Yes Payam Borden MD   naproxen sodium (ALEVE) 220 MG tablet Take 550 mg by mouth 2 times daily (with meals) Yes Historical Provider, MD   CPAP Machine MISC by Does not apply route Yes Historical Provider, MD   solifenacin (VESICARE) 10 MG tablet Take 1 tablet by mouth daily. Payam Borden MD      Family History   Problem Relation Age of Onset    Diabetes Mother     Cancer Mother         SCC, breast, colon polyp    Heart Disease Father     Heart Disease Brother     Cancer Brother         lung, liver     Social History     Tobacco Use    Smoking status: Never Smoker    Smokeless tobacco: Never Used    Tobacco comment: Advised not to start. Substance Use Topics    Alcohol use:  Yes Alcohol/week: 0.0 standard drinks     Comment: light    Drug use: No      Immunization History   Administered Date(s) Administered    COVID-19, Pfizer, PF, 30mcg/0.3mL 01/20/2021, 02/10/2021    Influenza Virus Vaccine 01/28/2010    Influenza, High Dose (Fluzone 65 yrs and older) 10/28/2014, 10/19/2015, 08/22/2016, 10/30/2017, 09/28/2018    Influenza, Quadv, Recombinant, IM PF (Flublok 18 yrs and older) 09/21/2020    Influenza, Triv, inactivated, subunit, adjuvanted, IM (Fluad 65 yrs and older) 09/25/2019    Pneumococcal Conjugate 13-valent ([id number]) 10/19/2015    Pneumococcal Polysaccharide ([id number]) 09/13/2007    Tdap (Boostrix, Adacel) 11/17/2005, 06/11/2016    Zoster Recombinant (Shingrix) 03/06/2019, 05/28/2019     LAST LABS  Cholesterol, Total   Date Value Ref Range Status   12/29/2020 147 0 - 199 mg/dL Final     LDL Calculated   Date Value Ref Range Status   12/29/2020 58 <100 mg/dL Final     HDL   Date Value Ref Range Status   12/29/2020 73 (H) 40 - 60 mg/dL Final   10/19/2011 72 (H) 40 - 60 mg/dl Final     Triglycerides   Date Value Ref Range Status   12/29/2020 80 0 - 150 mg/dL Final     Lab Results   Component Value Date    GLUCOSE 164 (H) 12/29/2020     Lab Results   Component Value Date     12/29/2020    K 4.2 12/29/2020    CREATININE 0.7 12/29/2020     Lab Results   Component Value Date    WBC 4.5 10/15/2020    HGB 12.6 10/15/2020    HCT 39.3 10/15/2020    MCV 85.7 10/15/2020     10/15/2020     Lab Results   Component Value Date    ALT 14 12/29/2020    AST 16 12/29/2020    ALKPHOS 101 12/29/2020    BILITOT 0.9 12/29/2020     TSH (uIU/mL)   Date Value   08/03/2020 1.12     Lab Results   Component Value Date    LABA1C 7.1 12/29/2020      CareTeam (Including outside providers/suppliers regularly involved in providing care):   Patient Care Team:  Sonny Waite MD as PCP - General (Family Medicine)  Sonny Waite MD as PCP - [facility] Empaneled Provider  Iain Villalobos MD as Consulting Physician (Orthopedic Surgery)  Antonio Palma MD as Consulting Physician (Orthopedic Surgery)  Denver Lye, DO as Consulting Physician (Pulmonology)  Zahida Cordon MD as Consulting Physician (Urology)  Katy Ellis (Chiropractic Medicine)    The following problems were reviewed today and where indicated follow up appointments were made and/or referrals ordered. Positive Risk Factor Screenings with Interventions:      Health Habits/Nutrition:  Health Habits/Nutrition  Do you exercise for at least 20 minutes 2-3 times per week?: (!) No  Have you lost any weight without trying in the past 3 months?: No  Do you eat only one meal per day?: No  Have you seen the dentist within the past year?: Yes  Body mass index: (!) 26.95  Health Habits/Nutrition Interventions:  · Inadequate physical activity:  will look into [employer]  · Nutritional issues:  patient is not ready to address his/her nutritional/weight issues at this time    Hearing/Vision:  No exam data present  Hearing/Vision  Do you or your family notice any trouble with your hearing that hasn't been managed with hearing aids?: (!) Yes  Do you have difficulty driving, watching TV, or doing any of your daily activities because of your eyesight?: No  Have you had an eye exam within the past year?: Yes  Hearing/Vision Interventions:  · Hearing concerns:  patient declines any further evaluation/treatment for hearing issues, has aides    Current Health Maintenance Status  Recommendations for Preventive Services Due: see orders.   Recommended screening schedule for the next 5-10 years is provided to the patient in written form: see Patient Instructions/AVS.    PHYSICAL EXAM:  VITALS:  /84 (Site: Right Upper Arm, Position: Sitting, Cuff Size: Large Adult)   Pulse 72   Temp 97.2 °F (36.2 °C) (Temporal)   Resp 16   Ht 5' 6\" (1.676 m)   Wt 167 lb (75.8 kg)   SpO2 94%   BMI 26.95 kg/m²   BP Readings from Last 5 Encounters: 03/24/21 138/84   12/23/20 104/68   08/03/20 120/64   12/27/19 122/84   11/19/19 138/70     Wt Readings from Last 5 Encounters:   03/24/21 167 lb (75.8 kg)   12/23/20 167 lb (75.8 kg)   08/03/20 164 lb (74.4 kg)   12/27/19 170 lb (77.1 kg)   11/19/19 171 lb (77.6 kg)   Body mass index is 26.95 kg/m². GENERAL: well-developed, well-nourished, alert, no distress, calm   EYES: negative findings: lids and lashes normal and conjunctivae and sclerae normal  ENT: normal TM's and external ear canals both ears  · External nose and ears appear normal  · Pharynx: normal. Exudates: None  · Lips, mucosa, and tongue normal  · Hearing grossly decreased. NECK: No adenopathy, supple, symmetrical, trachea midline  · Thyroid not enlarged, symmetric, no tenderness/mass/nodules  · no cervical nodes, no supraclavicular nodes  LUNGS:  Breathing unlabored  · clear to auscultation bilaterally and good air movement  CARDIOVASC: regular rate and rhythm, S1, S2 normal   LEGS:  Lower extremity edema: none     No carotid bruits  ABDOMEN: Soft, non-tender, no masses  · No hepatosplenomegaly  · No hernias noted. Exam limited by N/A  SKIN: warm and dry  · No rashes or suspicious lesions  PSYCH:  Alert and oriented  · Normal reasoning, insight good  · Facial expressions full, mood appropriate  · No memory disturbance noted  MUSCULOSKEL:  No significant finger or nail findings  · Spine symmetric, no deformities, no kyphosis   GAIT: UP and Go test: <30 seconds with gait: normal.  Speed Normal.  No significant balance checks. No extra steps on turn around. Assistive device: none        Assessment and Plan:      Diagnosis Orders   1. Routine general medical examination at a health care facility     2. Uncontrolled Type 2 diabetes mellitus without complication, without long-term current use of insulin (HCC)  A1c 7.3 today Poor control. Will cut out candy. POCT glycosylated hemoglobin (Hb A1C)   3.  Hyperlipidemia LDL goal <100  Stable with current medications. No adjustments needed. Will continue to monitor. 4. Iron deficiency anemia, unspecified iron deficiency anemia type  Stable with current medications. No adjustments needed. Will continue to monitor. 5. Irritable bowel syndrome with diarrhea  Improved with fiber   Stable. Plan as above and below. FYI: While Medicare provides you with a FREE ANNUAL PREVENTIVE PHYSICAL, this visit does NOT include management of chronic medical problems or physical examination. Dr. Darius Koehler usually does a combination visit if you have other medical problems so you don't have to come back for another visit. However, this means that there will be a co-pay. INSTRUCTIONS  NEXT APPOINTMENT: Please schedule check-up in 3 months. · PLEASE TAKE THIS FORM TO CHECK-OUT WINDOW TO SCHEDULE NEXT VISIT. · Since you are taking a CONTROLLED MEDICATION, will need to be seen at least every 6 months AND will need to have a future appointment scheduled for any refills. Be sure to schedule on way out of office today to avoid denial of future refills. · Look into going to [facility] more  · I fA1c still over 7 next visit, will need to add another med. · No candy at home.

## 2021-03-24 NOTE — PATIENT INSTRUCTIONS
FYI: While Medicare provides you with a FREE ANNUAL PREVENTIVE PHYSICAL, this visit does NOT include management of chronic medical problems or physical examination. Dr. Ken Carcamo usually does a combination visit if you have other medical problems so you don't have to come back for another visit. However, this means that there will be a co-pay. INSTRUCTIONS  NEXT APPOINTMENT: Please schedule check-up in 3 months. · PLEASE TAKE THIS FORM TO CHECK-OUT WINDOW TO SCHEDULE NEXT VISIT. · Since you are taking a CONTROLLED MEDICATION, will need to be seen at least every 6 months AND will need to have a future appointment scheduled for any refills. Be sure to schedule on way out of office today to avoid denial of future refills. · Look into going to [facility] more  · I fA1c still over 7 next visit, will need to add another med. · No candy at home. Patient Education         Personalized Preventive Plan for Roxane Mathews - 3/24/2021  Medicare offers a range of preventive health benefits. Some of the tests and screenings are paid in full while other may be subject to a deductible, co-insurance, and/or copay. Some of these benefits include a comprehensive review of your medical history including lifestyle, illnesses that may run in your family, and various assessments and screenings as appropriate. After reviewing your medical record and screening and assessments performed today your provider may have ordered immunizations, labs, imaging, and/or referrals for you. A list of these orders (if applicable) as well as your Preventive Care list are included within your After Visit Summary for your review. Other Preventive Recommendations:    · A preventive eye exam performed by an eye specialist is recommended every 1-2 years to screen for glaucoma; cataracts, macular degeneration, and other eye disorders. · A preventive dental visit is recommended every 6 months.   · Try to get at least 150 minutes of exercise per

## 2021-04-07 DIAGNOSIS — E11.9 TYPE 2 DIABETES MELLITUS WITHOUT COMPLICATION, WITHOUT LONG-TERM CURRENT USE OF INSULIN (HCC): ICD-10-CM

## 2021-04-07 RX ORDER — METFORMIN HYDROCHLORIDE 500 MG/1
1000 TABLET, EXTENDED RELEASE ORAL 2 TIMES DAILY
Qty: 360 TABLET | Refills: 0 | Status: SHIPPED | OUTPATIENT
Start: 2021-04-07 | End: 2021-06-28 | Stop reason: SDUPTHER

## 2021-04-08 ENCOUNTER — OFFICE VISIT (OUTPATIENT)
Dept: PULMONOLOGY | Age: 81
End: 2021-04-08
Payer: MEDICARE

## 2021-04-08 VITALS
TEMPERATURE: 96 F | RESPIRATION RATE: 16 BRPM | WEIGHT: 169 LBS | DIASTOLIC BLOOD PRESSURE: 62 MMHG | HEIGHT: 66 IN | BODY MASS INDEX: 27.16 KG/M2 | SYSTOLIC BLOOD PRESSURE: 136 MMHG | HEART RATE: 62 BPM | OXYGEN SATURATION: 98 %

## 2021-04-08 DIAGNOSIS — G47.33 OSA ON CPAP: Primary | ICD-10-CM

## 2021-04-08 DIAGNOSIS — Z99.89 OSA ON CPAP: Primary | ICD-10-CM

## 2021-04-08 PROCEDURE — 99213 OFFICE O/P EST LOW 20 MIN: CPT | Performed by: INTERNAL MEDICINE

## 2021-04-08 ASSESSMENT — SLEEP AND FATIGUE QUESTIONNAIRES
HOW LIKELY ARE YOU TO NOD OFF OR FALL ASLEEP WHILE LYING DOWN TO REST IN THE AFTERNOON WHEN CIRCUMSTANCES PERMIT: 0
HOW LIKELY ARE YOU TO NOD OFF OR FALL ASLEEP IN A CAR, WHILE STOPPED FOR A FEW MINUTES IN TRAFFIC: 0
ESS TOTAL SCORE: 4
HOW LIKELY ARE YOU TO NOD OFF OR FALL ASLEEP WHILE SITTING AND READING: 2

## 2021-04-08 NOTE — PROGRESS NOTES
Chief complaint  This is a 79y.o. year old female  who comes to see me with a chief complaint of   Chief Complaint   Patient presents with    Sleep Apnea    . HPI  Follow up on Moderate MARILYN with RDI of 26    Machine:  Patient is using a CPAP machine. Average usage is 6 hrs 31 min 00 sec. She is meeting 4 or more hours per night 97% of the time. Average AHI is 0.8. Sleeps well when the machine is functioning     Says the heating plate is not working and making strange noise. She is not able to use the machine when making this noise.   Machine is nearly 10years old      Sleep Medicine 4/8/2021 5/4/2020 8/22/2018 4/19/2017 4/20/2016 12/21/2015 7/23/2015   Sitting and reading 2 2 2 2 2 2 2   Watching TV 1 0 1 1 2 1 1   Sitting, inactive in a public place (e.g. a theatre or a meeting) 0 0 0 0 0 0 0   As a passenger in a car for an hour without a break 1 0 0 0 0 0 0   Lying down to rest in the afternoon when circumstances permit 0 1 1 1 1 2 2   Sitting and talking to someone 0 0 0 0 0 0 0   Sitting quietly after a lunch without alcohol 0 0 0 0 0 1 0   In a car, while stopped for a few minutes in traffic 0 0 0 0 0 0 0   Total score 4 3 4 4 5 6 5   Neck circumference - - 13.75 - - - 13.5       Past Medical History:   Diagnosis Date    Cataract     Chronic kidney disease     Chronic SI joint pain     Complication of anesthesia     oversedation    Depression     Fractured rib     RIGHT    Generalized osteoarthritis 5/9/2011    Hyperlipidemia     Hyperlipidemia LDL goal <100 5/9/2011    Nephrolithiasis     Osteoarthritis     Screening for cardiovascular condition 4/2013    nl TOPHER, no AAA, neg carotids per pr    Type 2 diabetes mellitus without complication, without long-term current use of insulin ([address] 5/9/2011    Type II or unspecified type diabetes mellitus without mention of complication, not stated as uncontrolled     Unspecified sleep apnea     Urinary incontinence        Past Surgical History:   Procedure Laterality Date    BLADDER SUSPENSION  1990'S    BREAST BIOPSY  2000    benign    ELBOW SURGERY  2000    calcium deposit excision    EYE SURGERY      FOOT NEUROMA SURGERY  11/05    RIGHT    KIDNEY STONE SURGERY  2010    LUMBAR FUSION  2012    L4-5    LUMBAR FUSION  2015    Fused 2-3-4    OTHER SURGICAL HISTORY Left 2/10/2016    Left Knee Injection     OTHER SURGICAL HISTORY      Injection in SI joint     SALPINGO-OOPHORECTOMY      MACIEJ AND BSO         PHYSICAL EXAM:  GENERAL:  Well nourished, alert, appears stated age, no distress. Calm  HEENT:  No apparent conjunctival irritation, extra-ocular muscles seem intact. Wearing glasses   NECK:  No masses visibile, no torticollis   LYMPH:  NO visible masses  LUNGS:  Clear bilaterally   ABDOMEN:  No gross apparent distention  EXTREMITIES:  No visibile swelling, no clubbing   NEURO:  No localizing deficits, CN II-XII intact. No aphasia noted   SKIN:  no visible rashes on exposed skin  PSYCH:  no hallucinations, normal affect      Lab Results   Component Value Date    WBC 4.5 10/15/2020    HGB 12.6 10/15/2020    HCT 39.3 10/15/2020    MCV 85.7 10/15/2020     10/15/2020       No results found for: BNP    Lab Results   Component Value Date    CREATININE 0.7 12/29/2020    BUN 17 12/29/2020     12/29/2020    K 4.2 12/29/2020     12/29/2020    CO2 26 12/29/2020         Assessment/Plan  1. MARILYN on CPAP  Benefiting and compliant. Benefiting from therapy by a low Sterling score, good energy levels, low fatigue, and control of chronic medical problems    Will order new machine. Heating plate is not working    Follow up 90 days after machine receipt                    .

## 2021-04-09 ENCOUNTER — TELEPHONE (OUTPATIENT)
Dept: PULMONOLOGY | Age: 81
End: 2021-04-09

## 2021-04-09 NOTE — TELEPHONE ENCOUNTER
Elisa with [employer] calls. They are out of network for cpap supplies for Pat. I called Karen Kilpatrick she said to pick someone in network and she will use her GPS to find their office. Will go with [address] [place] [address]. All orders etc. Faxed to [address] [place] [address] today.

## 2021-06-05 NOTE — PATIENT INSTRUCTIONS
INSTRUCTIONS  NEXT APPOINTMENT: Please schedule check-up in 3 months. · PLEASE TAKE THIS FORM TO CHECK-OUT WINDOW TO SCHEDULE NEXT VISIT. · PLEASE GET BLOODWORK DRAWN TODAY ON FIRST FLOOR in 170. Take orders with you. RESULTS- most blood tests back in couple days. We will call you if any problems. If bloodwork good, you will get letter in mail or notified thru [address] (if signed up) within 2 weeks. If you do not, please call office. · OTC Culturelle/acidophilus/probiotic for a week or so to restore bacterial balance. · Call for fever or blood in stool. · For mild diarrhea, may take Kaopectate. For severe diarrhea, may take immodium.   · Will refer to GI if persists  Patient Education [facility] ED Mental Health Handoff Note:       Brief HPI:  This is a 13 year old male signed out to me by Dr. Serrano.  See initial ED Provider note for full details of the presentation. Interval history is pertinent for aggressive behavior.    Home meds reviewed and ordered/administered: Yes    Medically stable for inpatient mental health admission: Yes.    Evaluated by mental health: Yes. The recommendation is for inpatient mental health treatment. Bed search in process    Safety concerns: At the time I received sign out, there were no safety concerns.    Hold Status:  Active Orders   N/A            Exam:   Patient Vitals for the past 24 hrs:   BP Temp Temp src Pulse Resp SpO2   06/04/21 2341 131/74 96.5  F (35.8  C) Oral 115 16 96 %   06/04/21 0756 111/72 97.8  F (36.6  C) Tympanic 96 16 98 %           ED Course:    Medications   melatonin tablet 3 mg (3 mg Oral Given 6/4/21 2056)   cetirizine (zyrTEC) tablet 5 mg (5 mg Oral Given 6/4/21 1000)   methylphenidate (METADATE ER) CR tablet 20 mg (has no administration in time range)   OLANZapine zydis (zyPREXA) ODT tab 5 mg (5 mg Oral Given 6/3/21 1355)            There were no significant events during my shift.    Patient was signed out to the oncoming provider      Impression:    ICD-10-CM    1. Attention deficit hyperactivity disorder (ADHD), combined type  F90.2 Asymptomatic SARS-CoV-2 COVID-19 Virus (Coronavirus) by PCR     Drug abuse screen 6 urine   2. Depression, unspecified depression type  F32.9    3. Aggressive behavior  R46.89        Plan:    1. Awaiting inpatient mental health admission/transfer.      RESULTS:   No results found for this visit on 06/02/21 (from the past 24 hour(s)).          MD Antwon Pinon, David Fuentes MD  06/05/21 8841

## 2021-06-11 ENCOUNTER — OFFICE VISIT (OUTPATIENT)
Dept: FAMILY MEDICINE CLINIC | Age: 81
End: 2021-06-11
Payer: MEDICARE

## 2021-06-11 VITALS
SYSTOLIC BLOOD PRESSURE: 120 MMHG | WEIGHT: 168 LBS | HEIGHT: 66 IN | HEART RATE: 81 BPM | BODY MASS INDEX: 27 KG/M2 | OXYGEN SATURATION: 96 % | DIASTOLIC BLOOD PRESSURE: 76 MMHG | RESPIRATION RATE: 16 BRPM

## 2021-06-11 DIAGNOSIS — Z98.1 S/P LUMBAR SPINAL FUSION: ICD-10-CM

## 2021-06-11 DIAGNOSIS — K21.9 GASTROESOPHAGEAL REFLUX DISEASE WITHOUT ESOPHAGITIS: ICD-10-CM

## 2021-06-11 DIAGNOSIS — N39.3 STRESS INCONTINENCE IN FEMALE: ICD-10-CM

## 2021-06-11 DIAGNOSIS — E11.9 TYPE 2 DIABETES MELLITUS WITHOUT COMPLICATION, WITHOUT LONG-TERM CURRENT USE OF INSULIN (HCC): Primary | ICD-10-CM

## 2021-06-11 DIAGNOSIS — E78.5 HYPERLIPIDEMIA LDL GOAL <100: Chronic | ICD-10-CM

## 2021-06-11 DIAGNOSIS — M15.9 GENERALIZED OSTEOARTHRITIS: ICD-10-CM

## 2021-06-11 PROCEDURE — 99214 OFFICE O/P EST MOD 30 MIN: CPT | Performed by: FAMILY MEDICINE

## 2021-06-11 PROCEDURE — 3051F HG A1C>EQUAL 7.0%<8.0%: CPT | Performed by: FAMILY MEDICINE

## 2021-06-11 RX ORDER — TOLTERODINE 4 MG/1
4 CAPSULE, EXTENDED RELEASE ORAL DAILY
Qty: 90 CAPSULE | Refills: 3 | Status: SHIPPED | OUTPATIENT
Start: 2021-06-11 | End: 2021-08-23

## 2021-06-11 NOTE — PATIENT INSTRUCTIONS
INSTRUCTIONS  NEXT APPOINTMENT: Please schedule check-up in 3 months. · PLEASE TAKE THIS FORM TO CHECK-OUT WINDOW TO SCHEDULE NEXT VISIT. · If legs get bothersome enough, see [facility]. · Use left thumb splint if arthritis acts up.   · Try Detrol for bladder

## 2021-06-11 NOTE — PROGRESS NOTES
tablet by mouth daily Yes Royal Gemma MD   ferrous sulfate (IRON 325) 325 (65 Fe) MG tablet Take 1 tablet by mouth daily (with breakfast) Yes Royal Gemma MD   naproxen sodium (ALEVE) 220 MG tablet Take 550 mg by mouth 2 times daily (with meals) Yes Historical Provider, MD   CPAP Machine MISC by Does not apply route Yes Historical Provider, MD   solifenacin (VESICARE) 10 MG tablet Take 1 tablet by mouth daily. Royal Gemma MD      Family History   Problem Relation Age of Onset    Diabetes Mother     Cancer Mother         SCC, breast, colon polyp    Heart Disease Father     Heart Disease Brother     Cancer Brother         lung, liver     Objective:   PHYSICAL EXAM   /76 (Site: Left Upper Arm, Position: Sitting, Cuff Size: Large Adult)   Pulse 81   Resp 16   Ht 5' 6\" (1.676 m)   Wt 168 lb (76.2 kg)   SpO2 96%   BMI 27.12 kg/m²   BP Readings from Last 5 Encounters:   06/11/21 120/76   04/08/21 136/62   03/24/21 138/84   12/23/20 104/68   08/03/20 120/64     Wt Readings from Last 5 Encounters:   06/11/21 168 lb (76.2 kg)   04/08/21 169 lb (76.7 kg)   03/24/21 167 lb (75.8 kg)   12/23/20 167 lb (75.8 kg)   08/03/20 164 lb (74.4 kg)      GENERAL:   · well-developed, well-nourished, alert, no distress. EYES:   · External findings: lids and lashes normal and conjunctivae and sclerae normal  LUNGS:    · Breathing unlabored  · clear to auscultation bilaterally and good air movement  CARDIOVASC:   · regular rate and rhythm  · LEGS:  Lower extremity edema: none    SKIN: warm and dry  PSYCH:    · Alert and oriented  · Normal reasoning, insight good  · Facial expressions full, mood appropriate  · No memory disturbance noted  MUSCULOSKEL:    · Diffuse OA changes  NEURO:   · CN 2-12 intact  Diabetic Foot Exam  · Foot exam reveals normal cap refill  · Feet normal skin color, no callouses, no ulcers, no venous stasis  · Feet- R 2nd toe overriding  · sensation grossly normal to light touch in feet. FEET:Monofilament Sensation:  normal      Assessment and Plan:      Diagnosis Orders   1. Type 2 diabetes mellitus without complication, without long-term current use of insulin ([facility])   DIABETES FOOT EXAM   2. Hyperlipidemia LDL goal <100     3. Gastroesophageal reflux disease without esophagitis     4. Stress incontinence in female  tolterodine (DETROL LA) 4 MG extended release capsule   5. Generalized osteoarthritis     6. S/P lumbar spinal fusion     Stable except back and OA worse. Continue current Tx plan. Any changes marked below. INSTRUCTIONS  NEXT APPOINTMENT: Please schedule check-up in 3 months. · PLEASE TAKE THIS FORM TO CHECK-OUT WINDOW TO SCHEDULE NEXT VISIT. · If legs get bothersome enough, see [facility]. · Use left thumb splint if arthritis acts up.   · Try Detrol for bladder

## 2021-06-25 DIAGNOSIS — E11.49 DIABETES MELLITUS TYPE 2 WITH NEUROLOGICAL MANIFESTATIONS (HCC): Primary | ICD-10-CM

## 2021-06-25 RX ORDER — GLUCOSAMINE HCL/CHONDROITIN SU 500-400 MG
CAPSULE ORAL
Qty: 200 STRIP | Refills: 3 | Status: SHIPPED | OUTPATIENT
Start: 2021-06-25

## 2021-06-25 RX ORDER — LANCETS 30 GAUGE
1 EACH MISCELLANEOUS 2 TIMES DAILY
Qty: 200 EACH | Refills: 3 | Status: SHIPPED | OUTPATIENT
Start: 2021-06-25

## 2021-06-28 DIAGNOSIS — E11.9 TYPE 2 DIABETES MELLITUS WITHOUT COMPLICATION, WITHOUT LONG-TERM CURRENT USE OF INSULIN (HCC): ICD-10-CM

## 2021-06-29 RX ORDER — METFORMIN HYDROCHLORIDE 500 MG/1
1000 TABLET, EXTENDED RELEASE ORAL 2 TIMES DAILY
Qty: 360 TABLET | Refills: 0 | Status: SHIPPED | OUTPATIENT
Start: 2021-06-29 | End: 2021-09-21 | Stop reason: SDUPTHER

## 2021-07-07 ENCOUNTER — OFFICE VISIT (OUTPATIENT)
Dept: PULMONOLOGY | Age: 81
End: 2021-07-07
Payer: MEDICARE

## 2021-07-07 VITALS
DIASTOLIC BLOOD PRESSURE: 60 MMHG | RESPIRATION RATE: 16 BRPM | HEIGHT: 66 IN | SYSTOLIC BLOOD PRESSURE: 152 MMHG | WEIGHT: 166 LBS | HEART RATE: 74 BPM | TEMPERATURE: 95 F | OXYGEN SATURATION: 97 % | BODY MASS INDEX: 26.68 KG/M2

## 2021-07-07 DIAGNOSIS — G47.33 OSA ON CPAP: Primary | ICD-10-CM

## 2021-07-07 DIAGNOSIS — Z99.89 OSA ON CPAP: Primary | ICD-10-CM

## 2021-07-07 PROCEDURE — 99212 OFFICE O/P EST SF 10 MIN: CPT | Performed by: INTERNAL MEDICINE

## 2021-07-07 ASSESSMENT — SLEEP AND FATIGUE QUESTIONNAIRES
HOW LIKELY ARE YOU TO NOD OFF OR FALL ASLEEP WHEN YOU ARE A PASSENGER IN A CAR FOR AN HOUR WITHOUT A BREAK: 0
HOW LIKELY ARE YOU TO NOD OFF OR FALL ASLEEP WHILE SITTING INACTIVE IN A PUBLIC PLACE: 0
HOW LIKELY ARE YOU TO NOD OFF OR FALL ASLEEP WHILE SITTING QUIETLY AFTER LUNCH WITHOUT ALCOHOL: 0
HOW LIKELY ARE YOU TO NOD OFF OR FALL ASLEEP WHILE SITTING AND TALKING TO SOMEONE: 0
HOW LIKELY ARE YOU TO NOD OFF OR FALL ASLEEP IN A CAR, WHILE STOPPED FOR A FEW MINUTES IN TRAFFIC: 0
HOW LIKELY ARE YOU TO NOD OFF OR FALL ASLEEP WHILE LYING DOWN TO REST IN THE AFTERNOON WHEN CIRCUMSTANCES PERMIT: 0
HOW LIKELY ARE YOU TO NOD OFF OR FALL ASLEEP WHILE SITTING AND READING: 2
ESS TOTAL SCORE: 4
HOW LIKELY ARE YOU TO NOD OFF OR FALL ASLEEP WHILE WATCHING TV: 2

## 2021-07-07 NOTE — PROGRESS NOTES
Chief complaint  This is a 79y.o. year old female  who comes to see me with a chief complaint of   Chief Complaint   Patient presents with    Sleep Apnea    . HPI  Follow up on Moderate MARILYN with RDI of 26    Machine:  Patient is using a CPAP machine. Average usage is 6 hrs 1 min 00 sec. She is meeting 4 or more hours per night 97% of the time. Average AHI is 1.0. She did get a new machine as her old machine's heating coil failed. No new issues to report. Sleeps well     Sleep Medicine 7/7/2021 4/8/2021 5/4/2020 8/22/2018 4/19/2017 4/20/2016 12/21/2015   Sitting and reading 2 2 2 2 2 2 2   Watching TV 2 1 0 1 1 2 1   Sitting, inactive in a public place (e.g. a theatre or a meeting) 0 0 0 0 0 0 0   As a passenger in a car for an hour without a break 0 1 0 0 0 0 0   Lying down to rest in the afternoon when circumstances permit 0 0 1 1 1 1 2   Sitting and talking to someone 0 0 0 0 0 0 0   Sitting quietly after a lunch without alcohol 0 0 0 0 0 0 1   In a car, while stopped for a few minutes in traffic 0 0 0 0 0 0 0   Total score 4 4 3 4 4 5 6   Neck circumference - - - 13.75 - - -       PHYSICAL EXAM:  GENERAL:  Well nourished, alert, appears stated age, no distress. Calm  HEENT:  No apparent conjunctival irritation, extra-ocular muscles seem intact. Wearing glasses   NECK:  No masses visibile, no torticollis   LYMPH:  NO visible masses  LUNGS:  Clear bilaterally   ABDOMEN:  No gross apparent distention  EXTREMITIES:  No visibile swelling, no clubbing   NEURO:  No localizing deficits, CN II-XII intact. No aphasia noted   SKIN:  no visible rashes on exposed skin  PSYCH:  no hallucinations, normal affect      Assessment/Plan  1. MARILYN on CPAP  Benefiting and compliant. Benefiting from therapy by a low [place] score, good energy levels, low fatigue, and control of chronic medical problems      Follow up in 12 months                      .

## 2021-07-23 DIAGNOSIS — E11.49 DIABETES MELLITUS TYPE 2 WITH NEUROLOGICAL MANIFESTATIONS (HCC): ICD-10-CM

## 2021-07-23 DIAGNOSIS — E78.5 HYPERLIPIDEMIA LDL GOAL <100: ICD-10-CM

## 2021-07-23 RX ORDER — ATORVASTATIN CALCIUM 80 MG/1
80 TABLET, FILM COATED ORAL DAILY
Qty: 90 TABLET | Refills: 1 | Status: SHIPPED | OUTPATIENT
Start: 2021-07-23 | End: 2021-10-04 | Stop reason: SDUPTHER

## 2021-07-23 RX ORDER — LANCETS 30 GAUGE
1 EACH MISCELLANEOUS 2 TIMES DAILY
Qty: 200 EACH | Refills: 3 | OUTPATIENT
Start: 2021-07-23

## 2021-07-27 ENCOUNTER — OFFICE VISIT (OUTPATIENT)
Dept: ENDOCRINOLOGY | Age: 81
End: 2021-07-27
Payer: MEDICARE

## 2021-07-27 DIAGNOSIS — E11.9 TYPE 2 DIABETES MELLITUS WITHOUT COMPLICATION, WITHOUT LONG-TERM CURRENT USE OF INSULIN (HCC): Primary | ICD-10-CM

## 2021-07-27 PROCEDURE — 97802 MEDICAL NUTRITION INDIV IN: CPT | Performed by: DIETITIAN, REGISTERED

## 2021-08-05 ENCOUNTER — TELEPHONE (OUTPATIENT)
Dept: PHARMACY | Facility: CLINIC | Age: 81
End: 2021-08-05

## 2021-08-05 NOTE — TELEPHONE ENCOUNTER
9/16/2021 10:30 AM Hemant Melissa MD [address] [facility]  Direct: [phone]  Department, toll free [phone], option [address] in place:  No   Gap Closed?: Yes    Time Spent (min): 20

## 2021-08-22 ENCOUNTER — PATIENT MESSAGE (OUTPATIENT)
Dept: FAMILY MEDICINE CLINIC | Age: 81
End: 2021-08-22

## 2021-08-23 RX ORDER — SOLIFENACIN SUCCINATE 10 MG/1
10 TABLET, FILM COATED ORAL DAILY
Qty: 90 TABLET | Refills: 3 | Status: SHIPPED | OUTPATIENT
Start: 2021-08-23 | End: 2021-10-04 | Stop reason: SDUPTHER

## 2021-08-23 NOTE — TELEPHONE ENCOUNTER
From: Myrna Acosta  To: Jose Carlos Byrne MD  Sent: 8/22/2021 1:18 PM EDT  Subject: Prescription Question    Forget all that stuff about Vesicare from [place] ([facility]). Thanks to a daughter and niece who use the service, I know that GoodRx is a really great deal right here from [address]. So if you can send a prescription to my [facility] for Vesicare or its generic that would be appreciated.    Thanks

## 2021-09-16 ENCOUNTER — OFFICE VISIT (OUTPATIENT)
Dept: FAMILY MEDICINE CLINIC | Age: 81
End: 2021-09-16
Payer: MEDICARE

## 2021-09-16 VITALS
DIASTOLIC BLOOD PRESSURE: 79 MMHG | WEIGHT: 166 LBS | HEART RATE: 75 BPM | OXYGEN SATURATION: 98 % | SYSTOLIC BLOOD PRESSURE: 138 MMHG | BODY MASS INDEX: 26.68 KG/M2 | HEIGHT: 66 IN

## 2021-09-16 DIAGNOSIS — I10 ESSENTIAL HYPERTENSION: ICD-10-CM

## 2021-09-16 DIAGNOSIS — D50.9 IRON DEFICIENCY ANEMIA, UNSPECIFIED IRON DEFICIENCY ANEMIA TYPE: ICD-10-CM

## 2021-09-16 DIAGNOSIS — E11.9 TYPE 2 DIABETES MELLITUS WITHOUT COMPLICATION, WITHOUT LONG-TERM CURRENT USE OF INSULIN (HCC): Primary | ICD-10-CM

## 2021-09-16 DIAGNOSIS — R10.31 RIGHT GROIN PAIN: ICD-10-CM

## 2021-09-16 DIAGNOSIS — E78.5 HYPERLIPIDEMIA LDL GOAL <100: Chronic | ICD-10-CM

## 2021-09-16 PROBLEM — E11.40 TYPE 2 DIABETES MELLITUS WITH DIABETIC NEUROPATHY (HCC): Status: ACTIVE | Noted: 2021-09-16

## 2021-09-16 LAB — HBA1C MFR BLD: 7.1 %

## 2021-09-16 PROCEDURE — 99214 OFFICE O/P EST MOD 30 MIN: CPT | Performed by: FAMILY MEDICINE

## 2021-09-16 PROCEDURE — 3051F HG A1C>EQUAL 7.0%<8.0%: CPT | Performed by: FAMILY MEDICINE

## 2021-09-16 PROCEDURE — 83036 HEMOGLOBIN GLYCOSYLATED A1C: CPT | Performed by: FAMILY MEDICINE

## 2021-09-16 RX ORDER — LOSARTAN POTASSIUM 25 MG/1
25 TABLET ORAL DAILY
Qty: 30 TABLET | Refills: 5 | Status: SHIPPED | OUTPATIENT
Start: 2021-09-16 | End: 2021-10-04 | Stop reason: SDUPTHER

## 2021-09-16 NOTE — PROGRESS NOTES
CHRONIC CONDITION NOTE   Subjective:   HPI CHRONIC:   Chief Complaint   Patient presents with    Diabetes     f/u 3mon      Patient here for follow-up of multiple chronic conditions includin. Type 2 diabetes mellitus without complication, without long-term current use of insulin ([address]    2. Hyperlipidemia LDL goal <100    3. Iron deficiency anemia, unspecified iron deficiency anemia type    4. Right groin pain    5. Essential hypertension      Complaints: BP up at [facility] 153/74 and 182/91  · Gets grinding sound in R groin x months. Some pain, not stiffness. Not taken OTC. · Going  To [place] and cruise next week. Review of Systems   Respiratory ROS: cough? No   Wheezing? No  Cardiovascular ROS: chest pain? No   Shortness of breath? No    Health Maintenance Due   Topic Date Due    A1C test (Diabetic or Prediabetic)  2021     CHART REVIEW   reports that she has never smoked. She has never used smokeless tobacco.  Health Maintenance Due   Topic Date Due    A1C test (Diabetic or Prediabetic)  2021     Current Outpatient Medications   Medication Instructions    atorvastatin (LIPITOR) 80 mg, Oral, DAILY    blood glucose monitor kit and supplies Test 2 times a day & as needed for symptoms of irregular blood glucose.  blood glucose monitor strips Test 2 times a day & as needed for symptoms of irregular blood glucose.     CPAP Machine MISC Does not apply    ferrous sulfate (IRON 325) 325 mg, Oral, DAILY WITH BREAKFAST    Lancets MISC 1 each, Does not apply, 2 TIMES DAILY    losartan (COZAAR) 25 mg, Oral, DAILY    metFORMIN (GLUCOPHAGE-XR) 1,000 mg, Oral, 2 TIMES DAILY    naproxen sodium (ALEVE) 550 mg, Oral, 2 TIMES DAILY WITH MEALS    solifenacin (VESICARE) 10 mg, Oral, DAILY     LAST LABS  LDL Calculated   Date Value Ref Range Status   2020 58 <100 mg/dL Final     Lab Results   Component Value Date    HDL 73 (H) 2020     Lab Results   Component Value Date    TRIG 80 12/29/2020     Lab Results   Component Value Date     12/29/2020    K 4.2 12/29/2020    CREATININE 0.7 12/29/2020     Lab Results   Component Value Date    WBC 4.5 10/15/2020    HGB 12.6 10/15/2020     10/15/2020     Lab Results   Component Value Date    ALT 14 12/29/2020    AST 16 12/29/2020    ALKPHOS 101 12/29/2020    BILITOT 0.9 12/29/2020     TSH (uIU/mL)   Date Value   08/03/2020 1.12     No components found for: GLU  Lab Results   Component Value Date    LABA1C 7.3 03/24/2021    LABA1C 7.1 12/29/2020    LABA1C 6.8 08/03/2020     Objective:   PHYSICAL EXAM   /79 (Site: Right Upper Arm, Position: Sitting, Cuff Size: Medium Adult)   Pulse 75   Ht 5' 6\" (1.676 m)   Wt 166 lb (75.3 kg)   SpO2 98%   BMI 26.79 kg/m²   BP Readings from Last 5 Encounters:   09/16/21 138/79   07/07/21 (!) 152/60   06/11/21 120/76   04/08/21 136/62   03/24/21 138/84     Wt Readings from Last 5 Encounters:   09/16/21 166 lb (75.3 kg)   07/07/21 166 lb (75.3 kg)   06/11/21 168 lb (76.2 kg)   04/08/21 169 lb (76.7 kg)   03/24/21 167 lb (75.8 kg)      GENERAL:   · well-developed, well-nourished, alert, no distress. LUNGS:    · Breathing unlabored  · clear to auscultation bilaterally and good air movement  CARDIOVASC:   · regular rate and rhythm  · LEGS:  Lower extremity edema: none    SKIN: warm and dry     Assessment and Plan:      Diagnosis Orders   1. Type 2 diabetes mellitus without complication, without long-term current use of insulin (HCC)  Comprehensive Metabolic Panel   2. Hyperlipidemia LDL goal <100  Comprehensive Metabolic Panel    Lipid Panel   3. Iron deficiency anemia, unspecified iron deficiency anemia type  CBC Auto Differential   4. Right groin pain NEW Suspect OA,    5. Essential hypertension NEW Start ARB   Stable     Continue current Tx plan. Any changes marked below.     INSTRUCTIONS  NEXT APPOINTMENT: Please schedule check-up in 3 months for diabetes with Avelino EmersonNurse Practitioner). Dr. Niurka Suggs and Shann Spurling (Nurse Practitioner) are using a team approach or managing diabetics in our practice. Office visits will alternate between these providers while we continue to maintain high quality of care. · PLEASE TAKE THIS FORM TO CHECK-OUT WINDOW TO SCHEDULE NEXT VISIT. Get x-ray. [facility] walk [facility] to get the x-ray. No appointment needed. PLEASE GET FASTING BLOODWORK DRAWN in next month. Lab is on first floor in [address]. Hours Monday to Friday 7 AM to 5 PM.   Add losartan for blood pressure. Please drop in to see medical assistant to repeat blood pressure in 3-4 weeks.

## 2021-09-16 NOTE — PATIENT INSTRUCTIONS
INSTRUCTIONS  NEXT APPOINTMENT: Please schedule check-up in 3 months for diabetes with Valerie Murillo (Nurse Practitioner). Dr. Lizz Matthew and Valerie Murillo (Nurse Practitioner) are using a team approach or managing diabetics in our practice. Office visits will alternate between these providers while we continue to maintain high quality of care. · PLEASE TAKE THIS FORM TO CHECK-OUT WINDOW TO SCHEDULE NEXT VISIT. Get x-ray. Can walk in to [facility] to get the x-ray. No appointment needed. PLEASE GET FASTING BLOODWORK DRAWN in next month. Lab is on first floor in [address]. Hours Monday to Friday 7 AM to 5 PM.   Add losartan for blood pressure. Please drop in to see medical assistant to repeat blood pressure in 3-4 weeks.

## 2021-09-17 ENCOUNTER — HOSPITAL ENCOUNTER (OUTPATIENT)
Age: 81
Discharge: HOME | End: 2021-09-17
Payer: MEDICARE

## 2021-09-17 ENCOUNTER — HOSPITAL ENCOUNTER (OUTPATIENT)
Dept: GENERAL RADIOLOGY | Age: 81
Discharge: HOME | End: 2021-09-17
Payer: MEDICARE

## 2021-09-17 DIAGNOSIS — R10.31 RIGHT GROIN PAIN: ICD-10-CM

## 2021-09-17 PROCEDURE — 73502 X-RAY EXAM HIP UNI 2-3 VIEWS: CPT

## 2021-09-21 DIAGNOSIS — E11.9 TYPE 2 DIABETES MELLITUS WITHOUT COMPLICATION, WITHOUT LONG-TERM CURRENT USE OF INSULIN (HCC): ICD-10-CM

## 2021-09-21 RX ORDER — METFORMIN HYDROCHLORIDE 500 MG/1
1000 TABLET, EXTENDED RELEASE ORAL 2 TIMES DAILY
Qty: 360 TABLET | Refills: 0 | Status: SHIPPED | OUTPATIENT
Start: 2021-09-21 | End: 2021-10-04 | Stop reason: SDUPTHER

## 2021-10-03 ENCOUNTER — PATIENT MESSAGE (OUTPATIENT)
Dept: FAMILY MEDICINE CLINIC | Age: 81
End: 2021-10-03

## 2021-10-03 DIAGNOSIS — E11.9 TYPE 2 DIABETES MELLITUS WITHOUT COMPLICATION, WITHOUT LONG-TERM CURRENT USE OF INSULIN (HCC): ICD-10-CM

## 2021-10-03 DIAGNOSIS — I10 ESSENTIAL HYPERTENSION: ICD-10-CM

## 2021-10-03 DIAGNOSIS — E78.5 HYPERLIPIDEMIA LDL GOAL <100: ICD-10-CM

## 2021-10-04 RX ORDER — SOLIFENACIN SUCCINATE 10 MG/1
10 TABLET, FILM COATED ORAL DAILY
Qty: 7 TABLET | Refills: 0 | Status: SHIPPED | OUTPATIENT
Start: 2021-10-04 | End: 2022-08-26 | Stop reason: SDUPTHER

## 2021-10-04 RX ORDER — LOSARTAN POTASSIUM 25 MG/1
25 TABLET ORAL DAILY
Qty: 7 TABLET | Refills: 0 | Status: SHIPPED | OUTPATIENT
Start: 2021-10-04 | End: 2021-10-25 | Stop reason: SDUPTHER

## 2021-10-04 RX ORDER — ATORVASTATIN CALCIUM 80 MG/1
80 TABLET, FILM COATED ORAL DAILY
Qty: 7 TABLET | Refills: 0 | Status: SHIPPED | OUTPATIENT
Start: 2021-10-04 | End: 2022-01-24 | Stop reason: SDUPTHER

## 2021-10-04 RX ORDER — METFORMIN HYDROCHLORIDE 500 MG/1
1000 TABLET, EXTENDED RELEASE ORAL 2 TIMES DAILY
Qty: 14 TABLET | Refills: 0 | Status: SHIPPED | OUTPATIENT
Start: 2021-10-04 | End: 2021-12-30 | Stop reason: SDUPTHER

## 2021-10-04 NOTE — TELEPHONE ENCOUNTER
From: Gretchen Correa  To: Kalpesh Ngo MD  Sent: 10/3/2021 9:36 PM EDT  Subject: Prescription Question    Due to airline bump I am in [place] until Thursday night. Took the last of my meds this morning cause I am supposed to be on my way home! Will you send 1 week of essential drugs to [address]. [phone] ? If you need info my cell it is [phone].  Thanks

## 2021-10-09 ENCOUNTER — PATIENT MESSAGE (OUTPATIENT)
Dept: FAMILY MEDICINE CLINIC | Age: 81
End: 2021-10-09

## 2021-10-25 DIAGNOSIS — D50.9 IRON DEFICIENCY ANEMIA, UNSPECIFIED IRON DEFICIENCY ANEMIA TYPE: ICD-10-CM

## 2021-10-25 RX ORDER — FERROUS SULFATE 325(65) MG
325 TABLET ORAL
Qty: 180 TABLET | Refills: 1 | Status: SHIPPED | OUTPATIENT
Start: 2021-10-25 | End: 2022-04-05

## 2021-10-28 ENCOUNTER — TELEPHONE (OUTPATIENT)
Dept: PHARMACY | Facility: CLINIC | Age: 81
End: 2021-10-28

## 2021-10-28 NOTE — TELEPHONE ENCOUNTER
[facility] HEALTH CLINICAL PHARMACY REVIEW: ADHERENCE REVIEW  Identified care gap per [employer]; fills at [address]: ACE/ARB, Diabetes and Statin adherence    Last Visit: 9/16/21    Patient found in Outcomes MTM and is not currently eligible for CMR/TIP    ASSESSMENT  ACE/ARB ADHERENCE    Per Insurance Records through 10/2/21 (2020 [place] = n/a%; YTD [place] = Filled only once; Potential Fail Date: 11/5/21):   LOSARTAN POT TAB 25MG last filled on 9/16/21 for 30 day supply. Next refill due: 10/16/21    Per Reconciled Dispense Report:  LOSARTAN POT TAB 25MG last filled on 9/16/21 for 30 day supply. Per [employer] Records-  LOSARTAN POT TAB 25MG last filled on 9/16/21 for 30 day supply at [address]    Per [employer]: Pharmacy will fill today 10/28/21    BP Readings from Last 3 Encounters:   09/16/21 138/79   07/07/21 (!) 152/60   06/11/21 120/76     Estimated Creatinine Clearance: 64 mL/min (based on SCr of 0.7 mg/dL). DIABETES ADHERENCE    Per Insurance Records through 10/2/21 ([address] [place] = 100%; YTD [place] = 98%; Potential Fail Date: 12/31/21): METFORMIN    TAB 500MG ER last filled on 9/27/21 for 90 day supply. Next refill due: 1/3/21    Per Reconciled Dispense Report:  METFORMIN    TAB 500MG ER last filled on 9/27/21 for 90 day supply. Per [employer] Records  METFORMIN    TAB 500MG ER last filled on 9/27/21 for 90 day supply at [facility] Results   Component Value Date    LABA1C 7.1 09/16/2021    LABA1C 7.3 03/24/2021    LABA1C 7.1 12/29/2020     NOTE A1c >9%    STATIN ADHERENCE    Per Insurance Records through 10/2/21 (2020 [place] = 97%; YTD [place] = 79%; Potential Fail Date: 11/9/21):   ATORVASTATIN TAB 80MG last filled on 7/27/21 for 90 day supply. Next refill due: 10/25/21    Per Reconciled Dispense Report:  ATORVASTATIN TAB 80MG last filled on 7/27/21 for 90 day supply.      Per [employer] Records  ATORVASTATIN TAB 80MG last filled on 9/27/21 for 90 day supply    Per [employer]:  Pharmacy will fill today 10/28/21       Lab Results   Component Value Date    CHOL 144 2021    TRIG 76 2021    HDL 73 (H) 2021    LDLCALC 56 2021     ALT   Date Value Ref Range Status   2021 15 10 - 40 U/L Final     AST   Date Value Ref Range Status   2021 16 15 - 37 U/L Final     The ASCVD Risk score (Samantha Beasley, et al., 2013) failed to calculate for the following reasons: The 2013 ASCVD risk score is only valid for ages 36 to 78     PLAN  The following are interventions that have been identified:     Reached patient to review. Let her know that both her lisinopril and atorvastatin were processed today and should be ready for  tomorrow. Patient verbalized understanding. Was weary to answer call due to 21 [phone] area code. Patient said she would     [place] in place:  No   Recommendation Provided To: Patient/Caregiver: 2 via Telephone and Pharmacy: 1   Intervention Detail: Adherence Monitorin and Refill(s) Provided   Gap Closed?: Yes    Intervention Accepted By: Patient/Caregiver: 1   Time Spent (min): 15          No future appointments. [facility].    [facility] free: [phone]

## 2021-12-10 ENCOUNTER — OFFICE VISIT (OUTPATIENT)
Dept: FAMILY MEDICINE CLINIC | Age: 81
End: 2021-12-10
Payer: MEDICARE

## 2021-12-10 VITALS
HEIGHT: 66 IN | SYSTOLIC BLOOD PRESSURE: 130 MMHG | WEIGHT: 167 LBS | HEART RATE: 78 BPM | BODY MASS INDEX: 26.84 KG/M2 | OXYGEN SATURATION: 98 % | RESPIRATION RATE: 16 BRPM | DIASTOLIC BLOOD PRESSURE: 78 MMHG

## 2021-12-10 DIAGNOSIS — K21.9 GASTROESOPHAGEAL REFLUX DISEASE WITHOUT ESOPHAGITIS: ICD-10-CM

## 2021-12-10 DIAGNOSIS — D50.9 IRON DEFICIENCY ANEMIA, UNSPECIFIED IRON DEFICIENCY ANEMIA TYPE: ICD-10-CM

## 2021-12-10 DIAGNOSIS — Z12.83 SKIN CANCER SCREENING: ICD-10-CM

## 2021-12-10 DIAGNOSIS — I10 ESSENTIAL HYPERTENSION: ICD-10-CM

## 2021-12-10 DIAGNOSIS — E11.9 TYPE 2 DIABETES MELLITUS WITHOUT COMPLICATION, WITHOUT LONG-TERM CURRENT USE OF INSULIN (HCC): Primary | ICD-10-CM

## 2021-12-10 DIAGNOSIS — E78.5 HYPERLIPIDEMIA LDL GOAL <100: ICD-10-CM

## 2021-12-10 PROCEDURE — 99214 OFFICE O/P EST MOD 30 MIN: CPT | Performed by: NURSE PRACTITIONER

## 2021-12-10 PROCEDURE — 3051F HG A1C>EQUAL 7.0%<8.0%: CPT | Performed by: NURSE PRACTITIONER

## 2021-12-10 ASSESSMENT — ENCOUNTER SYMPTOMS
CONSTIPATION: 0
CHEST TIGHTNESS: 0
DIARRHEA: 0
SHORTNESS OF BREATH: 0
NAUSEA: 0
COUGH: 0
VOMITING: 0
ABDOMINAL PAIN: 0

## 2021-12-10 NOTE — PROGRESS NOTES
12/10/2021    This is a 79 y.o. female   Chief Complaint   Patient presents with    Diabetes     pt is doing well. no new issues sometimes she checks sugars, she forgets    . HPI    Diabetes Mellitus Type 2: Current symptoms/problems include none. Home blood sugar records: patient does not test  Any episodes of hypoglycemia? no  Eye exam current (within one year): yes- 3/2021  Tobacco history: She  reports that she has never smoked. She has never used smokeless tobacco.     Hypertension:  Home blood pressure monitoring: No.  She is adherent to a low sodium diet. Patient denies chest pain, shortness of breath, headache, peripheral edema and dry cough. Antihypertensive medication side effects: no medication side effects noted. Hyperlipidemia:  No new myalgias or GI upset on atorvastatin (Lipitor). Lab Results   Component Value Date    LABA1C 7.1 09/16/2021    LABA1C 7.3 03/24/2021    LABA1C 7.1 12/29/2020     Lab Results   Component Value Date    LABMICR <1.20 03/17/2015    LABMICR YES 03/17/2015    CREATININE 0.7 09/17/2021     Lab Results   Component Value Date    ALT 15 09/17/2021    AST 16 09/17/2021     Lab Results   Component Value Date    CHOL 144 09/17/2021    TRIG 76 09/17/2021    HDL 73 (H) 09/17/2021    LDLCALC 56 09/17/2021        Some heartburn a couple of hours after eating dinner.       Patient Active Problem List   Diagnosis    Hyperlipidemia LDL goal <100    Insomnia    Osteopenia- DXA nl 6/12    Stress incontinence in female    Generalized osteoarthritis    Allergic rhinitis    Spondylosis    Obstructive sleep apnea syndrome    Iron deficiency anemia- neg col 9/2020    Lumbar disc disease with L 5 radiculopathy    Retrolisthesis L5-S1    Gastroesophageal reflux disease without esophagitis    Neuropathy right ankle S/P surgery    Chondromalacia patellae    Nephrolithiasis    Lumbosacral radiculopathy at L5 right    S/P lumbar spinal fusion    Dysplastic nevus of trunk    Hx of total knee replacement, left    Irritable bowel syndrome with diarrhea    Type 2 diabetes mellitus without complication, without long-term current use of insulin (HCC)          Current Outpatient Medications   Medication Sig Dispense Refill    ferrous sulfate (IRON 325) 325 (65 Fe) MG tablet Take 1 tablet by mouth daily (with breakfast) 180 tablet 1    losartan (COZAAR) 25 MG tablet Take 1 tablet by mouth daily 90 tablet 1    metFORMIN (GLUCOPHAGE-XR) 500 MG extended release tablet Take 2 tablets by mouth 2 times daily 14 tablet 0    solifenacin (VESICARE) 10 MG tablet Take 1 tablet by mouth daily 7 tablet 0    atorvastatin (LIPITOR) 80 MG tablet Take 1 tablet by mouth daily 7 tablet 0    Lancets MISC 1 each by Does not apply route 2 times daily 200 each 3    blood glucose monitor kit and supplies Test 2 times a day & as needed for symptoms of irregular blood glucose. 1 kit 0    blood glucose monitor strips Test 2 times a day & as needed for symptoms of irregular blood glucose. 200 strip 3    naproxen sodium (ALEVE) 220 MG tablet Take 550 mg by mouth 2 times daily (with meals)      CPAP Machine MISC by Does not apply route       No current facility-administered medications for this visit. Allergies   Allergen Reactions    Simvastatin        Review of Systems   Constitutional: Negative for activity change. Respiratory: Negative for cough, chest tightness and shortness of breath. Cardiovascular: Negative for chest pain. Gastrointestinal: Negative for abdominal pain, constipation, diarrhea, nausea and vomiting. Neurological: Negative for dizziness, syncope, weakness and headaches. Psychiatric/Behavioral: Negative for confusion. Vitals:    12/10/21 1010   BP: 130/78   Site: Right Upper Arm   Position: Sitting   Cuff Size: Large Adult   Pulse: 78   Resp: 16   SpO2: 98%   Weight: 167 lb (75.8 kg)   Height: 5' 6\" (1.676 m)       Body mass index is 26.95 kg/m².      Wt Future  Ferrous sulfate 325 mg daily. Last colonoscopy was 8/2020    Gastroesophageal reflux disease without esophagitis  Advised to take Pepcid 20 mg before dinner. Patient is to let me know if symptoms worsen or fail to improve within the next couple weeks. Skin cancer screening  -     NURIS Su MD, Dermatology, [facility]  Patient is requesting to see a dermatologist for a skin check. Referral placed. Return in about 16 weeks (around 4/1/2022), or if symptoms worsen or fail to improve, for AWV, with Dr. Leyla Anna.

## 2021-12-21 DIAGNOSIS — D50.9 IRON DEFICIENCY ANEMIA, UNSPECIFIED IRON DEFICIENCY ANEMIA TYPE: ICD-10-CM

## 2021-12-21 DIAGNOSIS — E11.9 TYPE 2 DIABETES MELLITUS WITHOUT COMPLICATION, WITHOUT LONG-TERM CURRENT USE OF INSULIN (HCC): ICD-10-CM

## 2021-12-21 DIAGNOSIS — E78.5 HYPERLIPIDEMIA LDL GOAL <100: ICD-10-CM

## 2021-12-21 DIAGNOSIS — I10 ESSENTIAL HYPERTENSION: ICD-10-CM

## 2021-12-21 LAB
A/G RATIO: 2.1 (ref 1.1–2.2)
ALBUMIN SERPL-MCNC: 4.2 G/DL (ref 3.4–5)
ALP BLD-CCNC: 120 U/L (ref 40–129)
ALT SERPL-CCNC: 16 U/L (ref 10–40)
ANION GAP SERPL CALCULATED.3IONS-SCNC: 12 MMOL/L (ref 3–16)
AST SERPL-CCNC: 15 U/L (ref 15–37)
BASOPHILS ABSOLUTE: 0 K/UL (ref 0–0.2)
BASOPHILS RELATIVE PERCENT: 0.6 %
BILIRUB SERPL-MCNC: 0.9 MG/DL (ref 0–1)
BUN BLDV-MCNC: 19 MG/DL (ref 7–20)
CALCIUM SERPL-MCNC: 9.8 MG/DL (ref 8.3–10.6)
CHLORIDE BLD-SCNC: 103 MMOL/L (ref 99–110)
CO2: 26 MMOL/L (ref 21–32)
CREAT SERPL-MCNC: 0.7 MG/DL (ref 0.6–1.2)
EOSINOPHILS ABSOLUTE: 0.1 K/UL (ref 0–0.6)
EOSINOPHILS RELATIVE PERCENT: 2.6 %
GFR AFRICAN AMERICAN: >60
GFR NON-AFRICAN AMERICAN: >60
GLUCOSE BLD-MCNC: 262 MG/DL (ref 70–99)
HCT VFR BLD CALC: 40.3 % (ref 36–48)
HEMOGLOBIN: 13 G/DL (ref 12–16)
LYMPHOCYTES ABSOLUTE: 0.8 K/UL (ref 1–5.1)
LYMPHOCYTES RELATIVE PERCENT: 18.2 %
MCH RBC QN AUTO: 29.3 PG (ref 26–34)
MCHC RBC AUTO-ENTMCNC: 32.2 G/DL (ref 31–36)
MCV RBC AUTO: 90.9 FL (ref 80–100)
MONOCYTES ABSOLUTE: 0.2 K/UL (ref 0–1.3)
MONOCYTES RELATIVE PERCENT: 5.7 %
NEUTROPHILS ABSOLUTE: 3.2 K/UL (ref 1.7–7.7)
NEUTROPHILS RELATIVE PERCENT: 72.9 %
PDW BLD-RTO: 14.8 % (ref 12.4–15.4)
PLATELET # BLD: 234 K/UL (ref 135–450)
PMV BLD AUTO: 8 FL (ref 5–10.5)
POTASSIUM SERPL-SCNC: 4.3 MMOL/L (ref 3.5–5.1)
RBC # BLD: 4.43 M/UL (ref 4–5.2)
SODIUM BLD-SCNC: 141 MMOL/L (ref 136–145)
TOTAL PROTEIN: 6.2 G/DL (ref 6.4–8.2)
WBC # BLD: 4.3 K/UL (ref 4–11)

## 2021-12-22 ENCOUNTER — PATIENT MESSAGE (OUTPATIENT)
Dept: FAMILY MEDICINE CLINIC | Age: 81
End: 2021-12-22

## 2021-12-22 LAB
ESTIMATED AVERAGE GLUCOSE: 171.4 MG/DL
HBA1C MFR BLD: 7.6 %

## 2021-12-22 NOTE — TELEPHONE ENCOUNTER
From: Evelyn Arias  To: Dr. Bhumi Toussaint  Sent: 12/22/2021 8:31 AM EST  Subject: Question regarding HEMOGLOBIN A1C    Now what?

## 2021-12-30 DIAGNOSIS — E11.9 TYPE 2 DIABETES MELLITUS WITHOUT COMPLICATION, WITHOUT LONG-TERM CURRENT USE OF INSULIN (HCC): ICD-10-CM

## 2022-01-02 RX ORDER — METFORMIN HYDROCHLORIDE 500 MG/1
1000 TABLET, EXTENDED RELEASE ORAL 2 TIMES DAILY
Qty: 14 TABLET | Refills: 0 | Status: SHIPPED
Start: 2022-01-02 | End: 2022-01-04 | Stop reason: SDUPTHER

## 2022-01-04 ENCOUNTER — PATIENT MESSAGE (OUTPATIENT)
Dept: FAMILY MEDICINE CLINIC | Age: 82
End: 2022-01-04

## 2022-01-04 DIAGNOSIS — E11.9 TYPE 2 DIABETES MELLITUS WITHOUT COMPLICATION, WITHOUT LONG-TERM CURRENT USE OF INSULIN (HCC): ICD-10-CM

## 2022-01-04 RX ORDER — METFORMIN HYDROCHLORIDE 500 MG/1
1000 TABLET, EXTENDED RELEASE ORAL 2 TIMES DAILY
Qty: 360 TABLET | Refills: 0 | Status: SHIPPED | OUTPATIENT
Start: 2022-01-04 | End: 2022-01-24 | Stop reason: SDUPTHER

## 2022-01-04 NOTE — TELEPHONE ENCOUNTER
From: Jose E Ascencio  To: Dr. Sara Fitzpatrick  Sent: 1/4/2022 4:08 PM EST  Subject: Met for in refill    Geovany told me script was only for 14 tablets 3 days instead of the usual 3 months.

## 2022-01-14 ENCOUNTER — NURSE ONLY (OUTPATIENT)
Dept: FAMILY MEDICINE CLINIC | Age: 82
End: 2022-01-14

## 2022-01-14 VITALS — SYSTOLIC BLOOD PRESSURE: 136 MMHG | DIASTOLIC BLOOD PRESSURE: 80 MMHG

## 2022-01-24 DIAGNOSIS — E11.9 TYPE 2 DIABETES MELLITUS WITHOUT COMPLICATION, WITHOUT LONG-TERM CURRENT USE OF INSULIN (HCC): ICD-10-CM

## 2022-01-25 RX ORDER — METFORMIN HYDROCHLORIDE 500 MG/1
1000 TABLET, EXTENDED RELEASE ORAL 2 TIMES DAILY
Qty: 360 TABLET | Refills: 0 | Status: SHIPPED | OUTPATIENT
Start: 2022-01-25 | End: 2022-03-10 | Stop reason: SDUPTHER

## 2022-03-10 DIAGNOSIS — E11.9 TYPE 2 DIABETES MELLITUS WITHOUT COMPLICATION, WITHOUT LONG-TERM CURRENT USE OF INSULIN (HCC): ICD-10-CM

## 2022-03-10 DIAGNOSIS — E78.5 HYPERLIPIDEMIA LDL GOAL <100: ICD-10-CM

## 2022-03-10 DIAGNOSIS — I10 ESSENTIAL HYPERTENSION: ICD-10-CM

## 2022-03-10 NOTE — TELEPHONE ENCOUNTER
[facility] CLINICAL PHARMACY: ADHERENCE REVIEW  Identified care gap per [employer]: fills at [address]: Statin adherence    Last Visit: 12/10/21    Patient also appears to be prescribed: ARB, DM     Patient not found in Outcomes [facility]    [address] [place] [address] Records claims through 3/6/22 (NKECHI Pandya = Filled only once; Potential Fail Date: 6/29/22): Losartan 25mg last filled on 1/21/22 for 90 day supply. Next refill due: 4/21/22    Per chart, rx daily and likely 0 refills remain    BP Readings from Last 3 Encounters:   01/14/22 136/80   12/10/21 130/78   09/16/21 138/79     Estimated Creatinine Clearance: 64 mL/min (based on SCr of 0.7 mg/dL). DIABETES ADHERENCE    Insurance Records claims through 3/6/22 (NKECHI Pandya = Filled only once; Potential Fail Date: 6/16/22): Metformin ER 500mg last filled on 1/5/22 for 90 day supply. Next refill due: 4/5/22    Per chart, rx 2 tabs BID and likely 0 refills remain    Lab Results   Component Value Date    LABA1C 7.6 12/21/2021    LABA1C 7.1 09/16/2021    LABA1C 7.3 03/24/2021     [id number] Records claims through 3/6/22 (NKECHI Pandya = Filled only once; Potential Fail Date: 5/3/22): Atorvastatin 80mg last filled on 1/25/22 for 30 day supply. Next refill due: 2/24/22    Per [employer] Portal: last filled on 2/28/22 for 30 day supply. Lab Results   Component Value Date    CHOL 144 09/17/2021    TRIG 76 09/17/2021    HDL 73 (H) 09/17/2021    LDLCALC 56 09/17/2021     ALT   Date Value Ref Range Status   12/21/2021 16 10 - 40 U/L Final     AST   Date Value Ref Range Status   12/21/2021 15 15 - 37 U/L Final     The ASCVD Risk score (Lidia Solo, et al., 2013) failed to calculate for the following reasons:     The 2013 ASCVD risk score is only valid for ages 36 to 78     PLAN  The following are interventions that have been identified:   - Patient needs refills for metformin and losartan  - Patient eligible for 90 day supply of atorvastatin    No future appointments.     Regan Kussmaul, PharmD, [facility]  Department, toll free: [phone], option 1

## 2022-03-10 NOTE — TELEPHONE ENCOUNTER
Arnold Ga MD, patient out of refills for metformin and losartan; can we update atorvastatin rx to 90-day supply to match her other daily/maintenance medications?  Rxs pended for your signature/modification as appropriate    LOV: 12/20/21  Next: to be scheduled    Thank you,  Kevin Cano, PharmD, [facility]  Department, toll free: [phone], option 1

## 2022-03-11 RX ORDER — ATORVASTATIN CALCIUM 80 MG/1
80 TABLET, FILM COATED ORAL DAILY
Qty: 90 TABLET | Refills: 1 | Status: SHIPPED | OUTPATIENT
Start: 2022-03-11 | End: 2022-09-26 | Stop reason: SDUPTHER

## 2022-03-11 RX ORDER — METFORMIN HYDROCHLORIDE 500 MG/1
1000 TABLET, EXTENDED RELEASE ORAL 2 TIMES DAILY
Qty: 360 TABLET | Refills: 1 | Status: SHIPPED | OUTPATIENT
Start: 2022-03-11 | End: 2022-04-05 | Stop reason: SDUPTHER

## 2022-03-11 RX ORDER — LOSARTAN POTASSIUM 25 MG/1
25 TABLET ORAL DAILY
Qty: 90 TABLET | Refills: 1 | Status: SHIPPED | OUTPATIENT
Start: 2022-03-11 | End: 2022-04-05 | Stop reason: SDUPTHER

## 2022-03-14 ENCOUNTER — TELEPHONE (OUTPATIENT)
Dept: FAMILY MEDICINE CLINIC | Age: 82
End: 2022-03-14

## 2022-03-14 NOTE — TELEPHONE ENCOUNTER
Noted losartan and metformin reordered, and atorvastatin 90-ds ordered, thank you. Patient also scheduled appt for 4/5/22.

## 2022-03-14 NOTE — TELEPHONE ENCOUNTER
Pt called and spoke with call center  Noted that pt returned call from the office and would like to know the reason she needs to schedule an appt    Looked and did not see encounter that pt was left a message to schedule appt. Please Advise if pt need to schedule an appt.   Pt can be reached at [phone]

## 2022-03-14 NOTE — TELEPHONE ENCOUNTER
----- Message from Joceline Mata sent at 3/14/2022 10:43 AM EDT -----  Subject: Message to Provider    QUESTIONS  Information for Provider? Pt. returned call from the office and would like   to know the reason she needs to schedule an appt.  ---------------------------------------------------------------------------  --------------  CALL BACK INFO  What is the best way for the office to contact you? OK to leave message on   voicemail  Preferred Call Back Phone Number? [phone]  ---------------------------------------------------------------------------  --------------  SCRIPT ANSWERS  Relationship to Patient?  Self

## 2022-04-05 ENCOUNTER — HOSPITAL ENCOUNTER (OUTPATIENT)
Age: 82
Discharge: HOME | End: 2022-04-05
Payer: MEDICARE

## 2022-04-05 ENCOUNTER — OFFICE VISIT (OUTPATIENT)
Dept: FAMILY MEDICINE CLINIC | Age: 82
End: 2022-04-05
Payer: MEDICARE

## 2022-04-05 ENCOUNTER — HOSPITAL ENCOUNTER (OUTPATIENT)
Dept: GENERAL RADIOLOGY | Age: 82
Discharge: HOME | End: 2022-04-05
Payer: MEDICARE

## 2022-04-05 VITALS
RESPIRATION RATE: 18 BRPM | DIASTOLIC BLOOD PRESSURE: 82 MMHG | SYSTOLIC BLOOD PRESSURE: 136 MMHG | HEIGHT: 66 IN | OXYGEN SATURATION: 98 % | WEIGHT: 169.2 LBS | BODY MASS INDEX: 27.19 KG/M2 | HEART RATE: 66 BPM

## 2022-04-05 DIAGNOSIS — R07.9 CHEST PAIN, UNSPECIFIED TYPE: ICD-10-CM

## 2022-04-05 DIAGNOSIS — E11.9 TYPE 2 DIABETES MELLITUS WITHOUT COMPLICATION, WITHOUT LONG-TERM CURRENT USE OF INSULIN (HCC): ICD-10-CM

## 2022-04-05 DIAGNOSIS — R06.02 SHORTNESS OF BREATH ON EXERTION: ICD-10-CM

## 2022-04-05 DIAGNOSIS — Z00.00 MEDICARE ANNUAL WELLNESS VISIT, SUBSEQUENT: Primary | ICD-10-CM

## 2022-04-05 DIAGNOSIS — Z00.00 MEDICARE ANNUAL WELLNESS VISIT, SUBSEQUENT: ICD-10-CM

## 2022-04-05 DIAGNOSIS — I10 ESSENTIAL HYPERTENSION: ICD-10-CM

## 2022-04-05 LAB
A/G RATIO: 1.9 (ref 1.1–2.2)
ALBUMIN SERPL-MCNC: 4.3 G/DL (ref 3.4–5)
ALP BLD-CCNC: 107 U/L (ref 40–129)
ALT SERPL-CCNC: 16 U/L (ref 10–40)
ANION GAP SERPL CALCULATED.3IONS-SCNC: 10 MMOL/L (ref 3–16)
AST SERPL-CCNC: 19 U/L (ref 15–37)
BASOPHILS ABSOLUTE: 0 K/UL (ref 0–0.2)
BASOPHILS RELATIVE PERCENT: 0.4 %
BILIRUB SERPL-MCNC: 1.2 MG/DL (ref 0–1)
BUN BLDV-MCNC: 17 MG/DL (ref 7–20)
CALCIUM SERPL-MCNC: 9.4 MG/DL (ref 8.3–10.6)
CHLORIDE BLD-SCNC: 104 MMOL/L (ref 99–110)
CO2: 26 MMOL/L (ref 21–32)
CREAT SERPL-MCNC: 0.6 MG/DL (ref 0.6–1.2)
EOSINOPHILS ABSOLUTE: 0.2 K/UL (ref 0–0.6)
EOSINOPHILS RELATIVE PERCENT: 3.7 %
GFR AFRICAN AMERICAN: >60
GFR NON-AFRICAN AMERICAN: >60
GLUCOSE BLD-MCNC: 164 MG/DL (ref 70–99)
HCT VFR BLD CALC: 36 % (ref 36–48)
HEMOGLOBIN: 11.7 G/DL (ref 12–16)
LYMPHOCYTES ABSOLUTE: 0.9 K/UL (ref 1–5.1)
LYMPHOCYTES RELATIVE PERCENT: 13.9 %
MCH RBC QN AUTO: 28.5 PG (ref 26–34)
MCHC RBC AUTO-ENTMCNC: 32.5 G/DL (ref 31–36)
MCV RBC AUTO: 87.8 FL (ref 80–100)
MONOCYTES ABSOLUTE: 0.4 K/UL (ref 0–1.3)
MONOCYTES RELATIVE PERCENT: 6.2 %
NEUTROPHILS ABSOLUTE: 4.7 K/UL (ref 1.7–7.7)
NEUTROPHILS RELATIVE PERCENT: 75.8 %
PDW BLD-RTO: 14.3 % (ref 12.4–15.4)
PLATELET # BLD: 301 K/UL (ref 135–450)
PMV BLD AUTO: 7.4 FL (ref 5–10.5)
POTASSIUM SERPL-SCNC: 3.8 MMOL/L (ref 3.5–5.1)
RBC # BLD: 4.1 M/UL (ref 4–5.2)
SODIUM BLD-SCNC: 140 MMOL/L (ref 136–145)
TOTAL PROTEIN: 6.6 G/DL (ref 6.4–8.2)
WBC # BLD: 6.2 K/UL (ref 4–11)

## 2022-04-05 PROCEDURE — G0439 PPPS, SUBSEQ VISIT: HCPCS

## 2022-04-05 PROCEDURE — 85025 COMPLETE CBC W/AUTO DIFF WBC: CPT

## 2022-04-05 PROCEDURE — 99213 OFFICE O/P EST LOW 20 MIN: CPT

## 2022-04-05 PROCEDURE — 80053 COMPREHEN METABOLIC PANEL: CPT

## 2022-04-05 PROCEDURE — 71046 X-RAY EXAM CHEST 2 VIEWS: CPT

## 2022-04-05 PROCEDURE — 36415 COLL VENOUS BLD VENIPUNCTURE: CPT

## 2022-04-05 PROCEDURE — 93000 ELECTROCARDIOGRAM COMPLETE: CPT

## 2022-04-05 RX ORDER — LOSARTAN POTASSIUM 25 MG/1
25 TABLET ORAL DAILY
Qty: 90 TABLET | Refills: 1 | Status: SHIPPED | OUTPATIENT
Start: 2022-04-05 | End: 2022-09-26 | Stop reason: SDUPTHER

## 2022-04-05 RX ORDER — METFORMIN HYDROCHLORIDE 500 MG/1
1000 TABLET, EXTENDED RELEASE ORAL 2 TIMES DAILY
Qty: 360 TABLET | Refills: 1 | Status: SHIPPED | OUTPATIENT
Start: 2022-04-05 | End: 2022-09-26 | Stop reason: SDUPTHER

## 2022-04-05 ASSESSMENT — LIFESTYLE VARIABLES
HOW OFTEN DURING THE LAST YEAR HAVE YOU FAILED TO DO WHAT WAS NORMALLY EXPECTED FROM YOU BECAUSE OF DRINKING: 0
HOW MANY STANDARD DRINKS CONTAINING ALCOHOL DO YOU HAVE ON A TYPICAL DAY: 1 OR 2
HOW OFTEN DO YOU HAVE A DRINK CONTAINING ALCOHOL: 4 OR MORE TIMES A WEEK
HAS A RELATIVE, FRIEND, DOCTOR, OR ANOTHER HEALTH PROFESSIONAL EXPRESSED CONCERN ABOUT YOUR DRINKING OR SUGGESTED YOU CUT DOWN: 0
HOW OFTEN DURING THE LAST YEAR HAVE YOU NEEDED AN ALCOHOLIC DRINK FIRST THING IN THE MORNING TO GET YOURSELF GOING AFTER A NIGHT OF HEAVY DRINKING: 0
HOW OFTEN DURING THE LAST YEAR HAVE YOU HAD A FEELING OF GUILT OR REMORSE AFTER DRINKING: 0
HAVE YOU OR SOMEONE ELSE BEEN INJURED AS A RESULT OF YOUR DRINKING: 0
HOW OFTEN DURING THE LAST YEAR HAVE YOU BEEN UNABLE TO REMEMBER WHAT HAPPENED THE NIGHT BEFORE BECAUSE YOU HAD BEEN DRINKING: 0
HOW OFTEN DURING THE LAST YEAR HAVE YOU FOUND THAT YOU WERE NOT ABLE TO STOP DRINKING ONCE YOU HAD STARTED: 0

## 2022-04-05 ASSESSMENT — PATIENT HEALTH QUESTIONNAIRE - PHQ9
SUM OF ALL RESPONSES TO PHQ QUESTIONS 1-9: 0
SUM OF ALL RESPONSES TO PHQ9 QUESTIONS 1 & 2: 0
SUM OF ALL RESPONSES TO PHQ QUESTIONS 1-9: 0
2. FEELING DOWN, DEPRESSED OR HOPELESS: 0
1. LITTLE INTEREST OR PLEASURE IN DOING THINGS: 0

## 2022-04-05 ASSESSMENT — ENCOUNTER SYMPTOMS
ABDOMINAL PAIN: 0
SHORTNESS OF BREATH: 1

## 2022-04-05 NOTE — PATIENT INSTRUCTIONS
Personalized Preventive Plan for Santos Hardwick - 4/5/2022  Medicare offers a range of preventive health benefits. Some of the tests and screenings are paid in full while other may be subject to a deductible, co-insurance, and/or copay. Some of these benefits include a comprehensive review of your medical history including lifestyle, illnesses that may run in your family, and various assessments and screenings as appropriate. After reviewing your medical record and screening and assessments performed today your provider may have ordered immunizations, labs, imaging, and/or referrals for you. A list of these orders (if applicable) as well as your Preventive Care list are included within your After Visit Summary for your review. Other Preventive Recommendations:    · A preventive eye exam performed by an eye specialist is recommended every 1-2 years to screen for glaucoma; cataracts, macular degeneration, and other eye disorders. · A preventive dental visit is recommended every 6 months. · Try to get at least 150 minutes of exercise per week or 10,000 steps per day on a pedometer . · Order or download the FREE \"Exercise & Physical Activity: Your Everyday Guide\" from [employer]. Call 3-488.148.6049 or search [employer] online. · You need 7145-8521 mg of calcium and 5369-5429 IU of vitamin D per day. It is possible to meet your calcium requirement with diet alone, but a vitamin D supplement is usually necessary to meet this goal.  · When exposed to the sun, use a sunscreen that protects against both UVA and UVB radiation with an SPF of 30 or greater. Reapply every 2 to 3 hours or after sweating, drying off with a towel, or swimming. · Always wear a seat belt when traveling in a car. Always wear a helmet when riding a bicycle or motorcycle.

## 2022-04-05 NOTE — PROGRESS NOTES
Medicare Annual Wellness Visit    Silke Kate is here for Medicare AWV    Assessment & Plan   Medicare annual wellness visit, subsequent  -     Comprehensive Metabolic Panel; Future  -     CBC with Auto Differential; Future  Shortness of breath on exertion  Will follow up once blood work and chest x-ray completed  -     Comprehensive Metabolic Panel; Future  -     CBC with Auto Differential; Future  -     EKG 12 Lead  -     XR CHEST STANDARD (2 VW); Future  Chest pain, unspecified type  Will follow up once blood work and chest x-ray completed  -     CBC with Auto Differential; Future  -     EKG 12 Lead  -     XR CHEST STANDARD (2 VW); Future  Essential hypertension  Stable  -     losartan (COZAAR) 25 MG tablet; Take 1 tablet by mouth daily, Disp-90 tablet, R-1Normal  -     Comprehensive Metabolic Panel; Future  Type 2 diabetes mellitus without complication, without long-term current use of insulin (HCC)  Stable  -     metFORMIN (GLUCOPHAGE-XR) 500 MG extended release tablet; Take 2 tablets by mouth 2 times daily, Disp-360 tablet, R-1Normal  -     POCT glycosylated hemoglobin (Hb A1C)  -     Comprehensive Metabolic Panel; Future      Recommendations for Preventive Services Due: see orders and patient instructions/AVS.  Recommended screening schedule for the next 5-10 years is provided to the patient in written form: see Patient Instructions/AVS.     Return in 3 months (on 7/5/2022) for chronic conditions. Subjective   The following acute and/or chronic problems were also addressed today:  Right chest pain that radiates into back at night time. Happens daily. Dull ache that lasts for about an hour. Does not happen when exercising. Spontaneous resolution    SOB with exertion- mostly with walking up steps. Has to stop and catch her breath. Denies any wheezing or chest pain when this happens. She is fully vaccinated for COVID-19. Had a negative COVID-19  test in December, no recent exposure to COVID-19. Has never seen a cardiologist or pulmonologist.    DM- stable on metformin  Hb A1c 7.2% today    HTN- stable on losartan    Review of Systems   Constitutional: Negative for activity change and fever. Respiratory: Positive for shortness of breath (with exertion). Cardiovascular: Positive for chest pain (dull ache left chest). Negative for palpitations and leg swelling. Gastrointestinal: Negative for abdominal pain. Neurological: Negative for dizziness and headaches. Patient's complete Health Risk Assessment and screening values have been reviewed and are found in Flowsheets. The following problems were reviewed today and where indicated follow up appointments were made and/or referrals ordered. Positive Risk Factor Screenings with Interventions:        Alcohol Screening:  AUDIT Total Score: 4    A score of 8 or more is associated with harmful or hazardous drinking. A score of 13 or more in women, and 15 or more in men, is likely to indicate alcohol dependence.           General Health and ACP:  General  In general, how would you say your health is?: Good  In the past 7 days, have you experienced any of the following: New or Increased Pain, New or Increased Fatigue, Loneliness, Social Isolation, Stress or Anger?: (!) Yes  Select all that apply: (!) New or Increased Pain  Do you get the social and emotional support that you need?: Yes  Do you have a Living Will?: Yes    Advance Directives     Power of  Living Will ACP-Advance Directive ACP-Power of     Not on File Filed on 11/03/14 Alfred[address] 30 Habits/Nutrition:     Physical Activity: Inactive    Days of Exercise per Week: 0 days    Minutes of Exercise per Session: 0 min     Have you lost any weight without trying in the past 3 months?: No  Body mass index: (!) 27.31  Have you seen the dentist within the past year?: Yes     Safety:  Do you have working smoke detectors?: Yes  Do you have any tripping hazards - loose or unsecured carpets or rugs?: No  Do you have any tripping hazards - clutter in doorways, halls, or stairs?: No  Do you have either shower bars, grab bars, non-slip mats or non-slip surfaces in your shower or bathtub?: Yes  Do all of your stairways have a railing or banister?: (!) No (Does not have stairs)  Do you always fasten your seatbelt when you are in a car?: Yes     No Positive Risk Factors identified today. Objective   Vitals:    04/05/22 0909   BP: 136/82   Site: Right Upper Arm   Position: Sitting   Cuff Size: Medium Adult   Pulse: 66   Resp: 18   SpO2: 98%   Weight: 169 lb 3.2 oz (76.7 kg)   Height: 5' 6\" (1.676 m)      Body mass index is 27.31 kg/m². General Appearance: alert and oriented to person, place and time, well-developed and well-nourished, in no acute distress  Skin: warm and dry, no rash or erythema  Head: normocephalic and atraumatic  Eyes: pupils equal, round, and reactive to light, extraocular eye movements intact, conjunctivae normal  ENT: delbert hearing aids  Pulmonary/Chest: clear to auscultation bilaterally- no wheezes, rales or rhonchi, normal air movement, no respiratory distress and no chest wall tenderness  Cardiovascular: normal rate, normal S1 and S2, no gallops, intact distal pulses and no carotid bruits  Abdomen: soft, non-tender, non-distended, normal bowel sounds, no masses or organomegaly  Breast: breasts appear normal, no suspicious masses, no skin or nipple changes or axillary nodes, no axillary lymphadenopathy  Extremities: no edema  Musculoskeletal: normal range of motion, no joint swelling, deformity or tenderness  Neurologic: gait and coordination normal and speech normal       Allergies   Allergen Reactions    Simvastatin      Prior to Visit Medications    Medication Sig Taking?  Authorizing Provider   losartan (COZAAR) 25 MG tablet Take 1 tablet by mouth daily Yes Ofelia Carrel, APRN - CNP   metFORMIN (GLUCOPHAGE-XR) 500 MG extended release tablet Take 2 tablets by mouth 2 times daily Yes Earnest Putnam APRN - CNP   atorvastatin (LIPITOR) 80 MG tablet Take 1 tablet by mouth daily Yes Oscar Feliciano MD   solifenacin (VESICARE) 10 MG tablet Take 1 tablet by mouth daily Yes Lakisha Christine MD   Lancets MISC 1 each by Does not apply route 2 times daily Yes Oscar Feliciano MD   blood glucose monitor kit and supplies Test 2 times a day & as needed for symptoms of irregular blood glucose. Yes Oscar Feliciano MD   blood glucose monitor strips Test 2 times a day & as needed for symptoms of irregular blood glucose. Yes Oscar Feliciano MD   naproxen sodium (ALEVE) 220 MG tablet Take 550 mg by mouth 2 times daily (with meals) Yes Historical Provider, MD   CPAP Machine MISC by Does not apply route Yes Historical Provider, MD   solifenacin (VESICARE) 10 MG tablet Take 1 tablet by mouth daily.   Oscar Feliciano MD       CareTe (Including outside providers/suppliers regularly involved in providing care):   Patient Care Team:  Oscar Feliciano MD as PCP - General (Family Medicine)  Oscar Feliciano MD as PCP - [facility]  Martha Hidalgo MD as Consulting Physician (Orthopedic Surgery)  Zi Ryder MD as Consulting Physician (Orthopedic Surgery)  Tra Felipe DO as Consulting Physician (Pulmonology)  Denny Major MD as Consulting Physician (Urology)  Eulogio Jones (Chiropractic Medicine)    Reviewed and updated this visit:  Tobacco  Allergies  Meds  Problems  Med Hx  Surg Hx  Soc Hx  Fam Hx

## 2022-04-07 DIAGNOSIS — R06.02 SHORTNESS OF BREATH ON EXERTION: Primary | ICD-10-CM

## 2022-04-07 RX ORDER — ALBUTEROL SULFATE 90 UG/1
2 AEROSOL, METERED RESPIRATORY (INHALATION)
Qty: 18 G | Refills: 5 | Status: SHIPPED | OUTPATIENT
Start: 2022-04-07 | End: 2022-08-29 | Stop reason: ALTCHOICE

## 2022-07-12 ENCOUNTER — OFFICE VISIT (OUTPATIENT)
Dept: FAMILY MEDICINE CLINIC | Age: 82
End: 2022-07-12
Payer: MEDICARE

## 2022-07-12 VITALS
RESPIRATION RATE: 16 BRPM | HEART RATE: 75 BPM | SYSTOLIC BLOOD PRESSURE: 138 MMHG | HEIGHT: 66 IN | BODY MASS INDEX: 27.32 KG/M2 | TEMPERATURE: 98.4 F | OXYGEN SATURATION: 98 % | DIASTOLIC BLOOD PRESSURE: 66 MMHG | WEIGHT: 170 LBS

## 2022-07-12 DIAGNOSIS — I10 ESSENTIAL HYPERTENSION: ICD-10-CM

## 2022-07-12 DIAGNOSIS — E78.5 HYPERLIPIDEMIA LDL GOAL <100: Chronic | ICD-10-CM

## 2022-07-12 DIAGNOSIS — E11.9 TYPE 2 DIABETES MELLITUS WITHOUT COMPLICATION, WITHOUT LONG-TERM CURRENT USE OF INSULIN (HCC): Primary | ICD-10-CM

## 2022-07-12 LAB — HBA1C MFR BLD: 7.1 %

## 2022-07-12 PROCEDURE — 83036 HEMOGLOBIN GLYCOSYLATED A1C: CPT

## 2022-07-12 PROCEDURE — 3051F HG A1C>EQUAL 7.0%<8.0%: CPT

## 2022-07-12 PROCEDURE — 99214 OFFICE O/P EST MOD 30 MIN: CPT

## 2022-07-12 PROCEDURE — 1123F ACP DISCUSS/DSCN MKR DOCD: CPT

## 2022-07-12 ASSESSMENT — ENCOUNTER SYMPTOMS
ABDOMINAL PAIN: 0
SHORTNESS OF BREATH: 0

## 2022-07-12 NOTE — PROGRESS NOTES
esophagitis    Chondromalacia patellae    Nephrolithiasis    Lumbosacral radiculopathy at L5 right    S/P lumbar spinal fusion    Dysplastic nevus of trunk    Hx of total knee replacement, left    Irritable bowel syndrome with diarrhea    Type 2 diabetes mellitus without complication, without long-term current use of insulin (HCC)    Low back pain          Current Outpatient Medications   Medication Sig Dispense Refill    losartan (COZAAR) 25 MG tablet Take 1 tablet by mouth daily 90 tablet 1    metFORMIN (GLUCOPHAGE-XR) 500 MG extended release tablet Take 2 tablets by mouth 2 times daily 360 tablet 1    atorvastatin (LIPITOR) 80 MG tablet Take 1 tablet by mouth daily 90 tablet 1    solifenacin (VESICARE) 10 MG tablet Take 1 tablet by mouth daily 7 tablet 0    Lancets MISC 1 each by Does not apply route 2 times daily 200 each 3    blood glucose monitor kit and supplies Test 2 times a day & as needed for symptoms of irregular blood glucose. 1 kit 0    blood glucose monitor strips Test 2 times a day & as needed for symptoms of irregular blood glucose. 200 strip 3    naproxen sodium (ALEVE) 220 MG tablet Take 550 mg by mouth 2 times daily (with meals)      CPAP Machine MISC by Does not apply route      albuterol sulfate HFA (VENTOLIN HFA) 108 (90 Base) MCG/ACT inhaler Inhale 2 puffs into the lungs every 4-6 hours as needed for Shortness of Breath (Patient not taking: Reported on 7/12/2022) 18 g 5     No current facility-administered medications for this visit. Allergies   Allergen Reactions    Simvastatin        Review of Systems   Constitutional: Negative for activity change and fever. Respiratory: Negative for shortness of breath. Cardiovascular: Negative for chest pain, palpitations and leg swelling. Gastrointestinal: Negative for abdominal pain. Neurological: Negative for dizziness and headaches.        Vitals:    07/12/22 0948   BP: 138/66   Site: Right Upper Arm   Position: Sitting   Cuff Size: Medium Adult   Pulse: 75   Resp: 16   Temp: 98.4 °F (36.9 °C)   TempSrc: Oral   SpO2: 98%   Weight: 170 lb (77.1 kg)   Height: 5' 6\" (1.676 m)       Body mass index is 27.44 kg/m². Wt Readings from Last 3 Encounters:   07/12/22 170 lb (77.1 kg)   04/05/22 169 lb 3.2 oz (76.7 kg)   12/10/21 167 lb (75.8 kg)       BP Readings from Last 3 Encounters:   07/12/22 138/66   04/05/22 136/82   01/14/22 136/80       Physical Exam  Vitals reviewed. Constitutional:       General: She is not in acute distress. Appearance: Normal appearance. HENT:      Head: Normocephalic and atraumatic. Cardiovascular:      Rate and Rhythm: Normal rate and regular rhythm. Pulses:           Dorsalis pedis pulses are 2+ on the right side and 2+ on the left side. Posterior tibial pulses are 2+ on the right side and 2+ on the left side. Heart sounds: Normal heart sounds. No murmur heard. No friction rub. No gallop. Pulmonary:      Effort: Pulmonary effort is normal. No respiratory distress. Breath sounds: Normal breath sounds. Musculoskeletal:         General: Normal range of motion. Right lower leg: No edema. Left lower leg: No edema. Feet:      Right foot:      Protective Sensation: 10 sites tested. 10 sites sensed. Skin integrity: Skin integrity normal. No ulcer, blister, skin breakdown, erythema, warmth or callus. Toenail Condition: Right toenails are normal.      Left foot:      Protective Sensation: 10 sites tested. 10 sites sensed. Skin integrity: Skin integrity normal. No ulcer, blister, skin breakdown, erythema, warmth or callus. Toenail Condition: Left toenails are normal.   Skin:     General: Skin is warm and dry. Neurological:      Mental Status: She is oriented to person, place, and time. Psychiatric:         Behavior: Behavior normal.         Thought Content:  Thought content normal.         Judgment: Judgment normal.         Assessmentand Gale Kelley was seen today for diabetes. Diagnoses and all orders for this visit:    Type 2 diabetes mellitus without complication, without long-term current use of insulin ([facility])  -     POCT glycosylated hemoglobin (Hb A1C)  -      DIABETES FOOT EXAM  Improving, HgbA1C 7.1% today  Advised to work on diet, exercise and weight loss    Essential hypertension  Stable on current medications  Discussed importance of diet, exercise and weight loss    Hyperlipidemia LDL goal <100  Stable on current medications  Discussed importance of diet, exercise and weight loss      Return in about 3 months (around 10/12/2022) for diabetes.

## 2022-08-02 ENCOUNTER — PATIENT MESSAGE (OUTPATIENT)
Dept: FAMILY MEDICINE CLINIC | Age: 82
End: 2022-08-02

## 2022-08-02 DIAGNOSIS — Z12.31 OTHER SCREENING MAMMOGRAM: Primary | ICD-10-CM

## 2022-08-02 NOTE — TELEPHONE ENCOUNTER
Do you want pt to get mammogram, last one was 3 years ago and the report said to come back in a year.   If so please order

## 2022-08-04 ENCOUNTER — PATIENT MESSAGE (OUTPATIENT)
Dept: FAMILY MEDICINE CLINIC | Age: 82
End: 2022-08-04

## 2022-08-04 NOTE — TELEPHONE ENCOUNTER
From: Nevaeh King  To: Dr. Esteban Castellano  Sent: 8/4/2022 1:33 PM EDT  Subject: Upcoming surgery    Just remembered I had not brought you all up to date on my foot problem. With 3 crooked toes on the right foot I saw Dr. Karen Cantor recently. My options were to straighten all three but that meant 3 weeks on a kneeling scooter and 8-10 weeks in a walking boot. Since this is my driving foot no driving for 3 months. Yuck! So the other option is to amputate the worst of the toes with a walking boot for 2 weeks while the wound heals. Surgery will be one Thursday afternoon at [facility], date TBD.

## 2022-08-09 DIAGNOSIS — D50.9 IRON DEFICIENCY ANEMIA, UNSPECIFIED IRON DEFICIENCY ANEMIA TYPE: ICD-10-CM

## 2022-08-09 LAB
BASOPHILS ABSOLUTE: 0 K/UL (ref 0–0.2)
BASOPHILS RELATIVE PERCENT: 1.3 %
EOSINOPHILS ABSOLUTE: 0.2 K/UL (ref 0–0.6)
EOSINOPHILS RELATIVE PERCENT: 4.6 %
FERRITIN: 13.7 NG/ML (ref 15–150)
HCT VFR BLD CALC: 34.8 % (ref 36–48)
HEMOGLOBIN: 11.1 G/DL (ref 12–16)
IRON SATURATION: 58 % (ref 15–50)
IRON: 251 UG/DL (ref 37–145)
LYMPHOCYTES ABSOLUTE: 0.9 K/UL (ref 1–5.1)
LYMPHOCYTES RELATIVE PERCENT: 23.1 %
MCH RBC QN AUTO: 26 PG (ref 26–34)
MCHC RBC AUTO-ENTMCNC: 31.9 G/DL (ref 31–36)
MCV RBC AUTO: 81.3 FL (ref 80–100)
MONOCYTES ABSOLUTE: 0.3 K/UL (ref 0–1.3)
MONOCYTES RELATIVE PERCENT: 7.4 %
NEUTROPHILS ABSOLUTE: 2.4 K/UL (ref 1.7–7.7)
NEUTROPHILS RELATIVE PERCENT: 63.6 %
PDW BLD-RTO: 19.8 % (ref 12.4–15.4)
PLATELET # BLD: 304 K/UL (ref 135–450)
PMV BLD AUTO: 8 FL (ref 5–10.5)
RBC # BLD: 4.28 M/UL (ref 4–5.2)
TOTAL IRON BINDING CAPACITY: 433 UG/DL (ref 260–445)
WBC # BLD: 3.8 K/UL (ref 4–11)

## 2022-08-10 DIAGNOSIS — D50.9 IRON DEFICIENCY ANEMIA, UNSPECIFIED IRON DEFICIENCY ANEMIA TYPE: ICD-10-CM

## 2022-08-10 RX ORDER — FERROUS SULFATE 325(65) MG
325 TABLET ORAL 2 TIMES DAILY
Qty: 180 TABLET | Refills: 1 | Status: SHIPPED | OUTPATIENT
Start: 2022-08-10

## 2022-08-19 ENCOUNTER — OFFICE VISIT (OUTPATIENT)
Dept: FAMILY MEDICINE CLINIC | Age: 82
End: 2022-08-19
Payer: MEDICARE

## 2022-08-19 VITALS
HEIGHT: 66 IN | BODY MASS INDEX: 27.32 KG/M2 | OXYGEN SATURATION: 99 % | WEIGHT: 170 LBS | SYSTOLIC BLOOD PRESSURE: 130 MMHG | DIASTOLIC BLOOD PRESSURE: 72 MMHG | HEART RATE: 70 BPM

## 2022-08-19 DIAGNOSIS — Z01.818 PRE-OP EVALUATION: Primary | ICD-10-CM

## 2022-08-19 DIAGNOSIS — M20.5X1 CROSSOVER TOE DEFORMITY OF RIGHT FOOT: ICD-10-CM

## 2022-08-19 DIAGNOSIS — D50.9 IRON DEFICIENCY ANEMIA, UNSPECIFIED IRON DEFICIENCY ANEMIA TYPE: ICD-10-CM

## 2022-08-19 DIAGNOSIS — E11.40 TYPE 2 DIABETES MELLITUS WITH DIABETIC NEUROPATHY, WITHOUT LONG-TERM CURRENT USE OF INSULIN (HCC): ICD-10-CM

## 2022-08-19 DIAGNOSIS — G47.33 OBSTRUCTIVE SLEEP APNEA SYNDROME: ICD-10-CM

## 2022-08-19 DIAGNOSIS — E78.5 HYPERLIPIDEMIA LDL GOAL <100: Chronic | ICD-10-CM

## 2022-08-19 PROBLEM — E11.22 TYPE 2 DIABETES MELLITUS WITH CHRONIC KIDNEY DISEASE (HCC): Status: ACTIVE | Noted: 2022-08-19

## 2022-08-19 PROCEDURE — 1123F ACP DISCUSS/DSCN MKR DOCD: CPT

## 2022-08-19 PROCEDURE — 99214 OFFICE O/P EST MOD 30 MIN: CPT

## 2022-08-19 PROCEDURE — 3051F HG A1C>EQUAL 7.0%<8.0%: CPT

## 2022-08-19 NOTE — PROGRESS NOTES
Preoperative Consultation      Dragan Moreland  YOB: 1940    Date of Service:  8/19/2022    Vitals:    08/19/22 1214   BP: 130/72   Site: Left Upper Arm   Position: Sitting   Cuff Size: Large Adult   Pulse: 70   SpO2: 99%   Weight: 170 lb (77.1 kg)   Height: 5' 6\" (1.676 m)      Wt Readings from Last 2 Encounters:   08/19/22 170 lb (77.1 kg)   07/12/22 170 lb (77.1 kg)     BP Readings from Last 3 Encounters:   08/19/22 130/72   07/12/22 138/66   04/05/22 136/82        Chief Complaint   Patient presents with    Pre-op Exam     9/1/22, Gracy Bello MD, [facility]  Amputate second toe on right foot. Allergies   Allergen Reactions    Simvastatin      Outpatient Medications Marked as Taking for the 8/19/22 encounter (Office Visit) with NOHELIA Ramirez - CNP   Medication Sig Dispense Refill    ferrous sulfate (IRON 325) 325 (65 Fe) MG tablet Take 1 tablet by mouth in the morning and 1 tablet before bedtime. 180 tablet 1    losartan (COZAAR) 25 MG tablet Take 1 tablet by mouth daily 90 tablet 1    metFORMIN (GLUCOPHAGE-XR) 500 MG extended release tablet Take 2 tablets by mouth 2 times daily 360 tablet 1    atorvastatin (LIPITOR) 80 MG tablet Take 1 tablet by mouth daily 90 tablet 1    solifenacin (VESICARE) 10 MG tablet Take 1 tablet by mouth daily 7 tablet 0    Lancets MISC 1 each by Does not apply route 2 times daily 200 each 3    blood glucose monitor kit and supplies Test 2 times a day & as needed for symptoms of irregular blood glucose. 1 kit 0    blood glucose monitor strips Test 2 times a day & as needed for symptoms of irregular blood glucose.  200 strip 3    naproxen sodium (ANAPROX) 220 MG tablet Take 550 mg by mouth 2 times daily (with meals)      CPAP Machine MISC by Does not apply route         This patient presents to the office today for a preoperative consultation at the request of surgeon, Dr. Gracy Bello, who plans on performing amputation of second digit right foot on September 1 at [facility].  The current problem began about 15 years ago, and symptoms have been unchanged with time. Conservative therapy:  has had pins placed multiple times- all failed .     Planned anesthesia: Local   Known anesthesia problems: None, she reports that she goes under very easily, but her BP has dropped   Bleeding risk: No recent or remote history of abnormal bleeding  Personal or FH of DVT/PE: No    Patient objection to receiving blood products: No    Patient Active Problem List   Diagnosis    Hyperlipidemia LDL goal <100    Osteopenia- DXA nl 6/12    Stress incontinence in female    Generalized osteoarthritis    Allergic rhinitis    Spondylosis    Obstructive sleep apnea syndrome    Iron deficiency anemia- neg col 9/2020    Lumbar disc disease with L 5 radiculopathy    Retrolisthesis L5-S1    Gastroesophageal reflux disease without esophagitis    Chondromalacia patellae    Nephrolithiasis    Lumbosacral radiculopathy at L5 right    S/P lumbar spinal fusion    Dysplastic nevus of trunk    Hx of total knee replacement, left    Irritable bowel syndrome with diarrhea    Type 2 diabetes mellitus without complication, without long-term current use of insulin ([facility])    Low back pain       Past Medical History:   Diagnosis Date    Cataract     Chronic kidney disease     Chronic SI joint pain     Complication of anesthesia     oversedation    Depression     Fractured rib     RIGHT    Generalized osteoarthritis 5/9/2011    Hyperlipidemia     Hyperlipidemia LDL goal <100 5/9/2011    Nephrolithiasis     Osteoarthritis     Screening for cardiovascular condition 4/2013    nl TOPHER, no AAA, neg carotids per pr    Type 2 diabetes mellitus without complication, without long-term current use of insulin ([facility] [address] 5/9/2011    Type II or unspecified type diabetes mellitus without mention of complication, not stated as uncontrolled     Unspecified sleep apnea     Urinary incontinence      Past Surgical History: Procedure Laterality Date    BLADDER SUSPENSION  1990'S    BREAST BIOPSY  2000    benign    ELBOW SURGERY  2000    calcium deposit excision    EYE SURGERY      FOOT NEUROMA SURGERY  11/05    RIGHT    KIDNEY STONE SURGERY  2010    LUMBAR FUSION  2012    L4-5    LUMBAR FUSION  2015    Fused 2-3-4    OTHER SURGICAL HISTORY Left 2/10/2016    Left Knee Injection     OTHER SURGICAL HISTORY      Injection in SI joint     SALPINGO-OOPHORECTOMY      MACIEJ AND BSO (CERVIX REMOVED)       Family History   Problem Relation Age of Onset    Diabetes Mother     Cancer Mother         SCC, breast, colon polyp    Heart Disease Father     Heart Disease Brother     Cancer Brother         lung, liver     Social History     Socioeconomic History    Marital status:      Spouse name: Valentino Flattery    Number of children: Not on file    Years of education: Not on file    Highest education level: Not on file   Occupational History    Not on file   Tobacco Use    Smoking status: Never    Smokeless tobacco: Never    Tobacco comments:     Advised not to start. Vaping Use    Vaping Use: Never used   Substance and Sexual Activity    Alcohol use: Yes     Alcohol/week: 0.0 standard drinks     Comment: light    Drug use: No    Sexual activity: Yes     Partners: Male     Comment:    Other Topics Concern    Not on file   Social History Narrative    Self-breast exams: yes. Exercise: [employer] sometimes, walk sometimes. Seatbelt use: Always. Living will: yes, copy requested.          Social Determinants of Health     Financial Resource Strain: Not on file   Food Insecurity: Not on file   Transportation Needs: Not on file   Physical Activity: Inactive    Days of Exercise per Week: 0 days    Minutes of Exercise per Session: 0 min   Stress: Not on file   Social Connections: Not on file   Intimate Partner Violence: Not on file   Housing Stability: Not on file       Review of Systems  A comprehensive review of systems was negative except for what was noted in the HPI. Physical Exam   Constitutional: She is oriented to person, place, and time. She appears well-developed and well-nourished. No distress. HENT:   Head: Normocephalic and atraumatic. Mouth/Throat: Uvula is midline, oropharynx is clear and moist and mucous membranes are normal.   Eyes: Conjunctivae and EOM are normal. Pupils are equal, round, and reactive to light. Neck: Trachea normal and normal range of motion. Neck supple. No JVD present. Carotid bruit is not present. No mass and no thyromegaly present. Cardiovascular: Normal rate, regular rhythm, normal heart sounds and intact distal pulses. Exam reveals no gallop and no friction rub. No murmur heard. Pulmonary/Chest: Effort normal and breath sounds normal. No respiratory distress. She has no wheezes. She has no rales. Abdominal: Soft. Normal aorta and bowel sounds are normal. She exhibits no distension and no mass. There is no hepatosplenomegaly. No tenderness. Musculoskeletal: She exhibits no edema and no tenderness. Right second digit crossover  Neurological: She is alert and oriented to person, place, and time. She has normal strength. No cranial nerve deficit or sensory deficit. Coordination and gait normal.   Skin: Skin is warm and dry. No rash noted. No erythema. Psychiatric: She has a normal mood and affect. Her behavior is normal.     EKG Interpretation:  not needed.     Lab Review   Orders Only on 08/09/2022   Component Date Value    WBC 08/09/2022 3.8 (A)    RBC 08/09/2022 4.28     Hemoglobin 08/09/2022 11.1 (A)    Hematocrit 08/09/2022 34.8 (A)    MCV 08/09/2022 81.3     MCH 08/09/2022 26.0     MCHC 08/09/2022 31.9     RDW 08/09/2022 19.8 (A)    Platelets 97/37/6658 304     MPV 08/09/2022 8.0     Neutrophils % 08/09/2022 63.6     Lymphocytes % 08/09/2022 23.1     Monocytes % 08/09/2022 7.4     Eosinophils % 08/09/2022 4.6     Basophils % 08/09/2022 1.3     Neutrophils Absolute 08/09/2022 2.4     Lymphocytes Absolute 08/09/2022 0.9 (A)    Monocytes Absolute 08/09/2022 0.3     Eosinophils Absolute 08/09/2022 0.2     Basophils Absolute 08/09/2022 0.0     Iron 08/09/2022 251 (A)    TIBC 08/09/2022 433     Iron Saturation 08/09/2022 58 (A)    Ferritin 08/09/2022 13.7 (A)   Office Visit on 07/12/2022   Component Date Value    Hemoglobin A1C 07/12/2022 7.1    Hospital Outpatient Visit on 04/05/2022   Component Date Value    Sodium 04/05/2022 140     Potassium 04/05/2022 3.8     Chloride 04/05/2022 104     CO2 04/05/2022 26     Anion Gap 04/05/2022 10     Glucose 04/05/2022 164 (A)    BUN 04/05/2022 17     Creatinine 04/05/2022 0.6     GFR Non- 04/05/2022 >60     GFR  04/05/2022 >60     Calcium 04/05/2022 9.4     Total Protein 04/05/2022 6.6     Albumin 04/05/2022 4.3     Albumin/Globulin Ratio 04/05/2022 1.9     Total Bilirubin 04/05/2022 1.2 (A)    Alkaline Phosphatase 04/05/2022 107     ALT 04/05/2022 16     AST 04/05/2022 19     WBC 04/05/2022 6.2     RBC 04/05/2022 4.10     Hemoglobin 04/05/2022 11.7 (A)    Hematocrit 04/05/2022 36.0     MCV 04/05/2022 87.8     MCH 04/05/2022 28.5     MCHC 04/05/2022 32.5     RDW 04/05/2022 14.3     Platelets 37/28/2969 301     MPV 04/05/2022 7.4     Neutrophils % 04/05/2022 75.8     Lymphocytes % 04/05/2022 13.9     Monocytes % 04/05/2022 6.2     Eosinophils % 04/05/2022 3.7     Basophils % 04/05/2022 0.4     Neutrophils Absolute 04/05/2022 4.7     Lymphocytes Absolute 04/05/2022 0.9 (A)    Monocytes Absolute 04/05/2022 0.4     Eosinophils Absolute 04/05/2022 0.2     Basophils Absolute 04/05/2022 0.0            Assessment:       79 y.o. patient with planned surgery as above. Known risk factors for perioperative complications: Diabetes mellitus, Hypertension  Current medications which may produce withdrawal symptoms if withheld perioperatively: none      Plan:     1. Preoperative workup as follows: hemoglobin, hematocrit  2.  Change in medication regimen before surgery: Hold all medications on morning of surgery  3. Prophylaxis for cardiac events with perioperative beta-blockers: Not indicated  ACC/AHA indications for pre-operative beta-blocker use:    Vascular surgery with history of postitive stress test  Intermediate or high risk surgery with history of CAD   Intermediate or high risk surgery with multiple clinical predictors of CAD- 2 of the following: history of compensated or prior heart failure, history of cerebrovascular disease, DM, or renal insufficiency    Routine administration of higher-dose, long-acting metoprolol in beta-blocker-naïve patients on the day of surgery, and in the absence of dose titration is associated with an overall increase in mortality. Beta-blockers should be started days to weeks prior to surgery and titrated to pulse < 70.  4. Deep vein thrombosis prophylaxis: regimen to be chosen by surgical team  5. No contraindications to planned surgery    6. DMII: stable on current medications  7. MARILYN: stable, wears CPAP nightly  8. Hyperlipidemia: stable on current medications  9. Iron deficiency anemia: levels tested 8/9/22 showing mild anemia. She was advised to restart iron supplements BID.

## 2022-08-26 RX ORDER — SOLIFENACIN SUCCINATE 10 MG/1
10 TABLET, FILM COATED ORAL DAILY
Qty: 90 TABLET | Refills: 1 | Status: SHIPPED
Start: 2022-08-26 | End: 2022-09-07

## 2022-08-29 NOTE — PROGRESS NOTES
[address] [facility] time __1130__________        Surgery time ______1230_____    Take the following medications with a sip of water: Follow your MD/Surgeons pre-procedure instructions regarding your medications     Do not eat or drink anything after 12:00 midnight prior to your surgery. This includes water chewing gum, mints and ice chips. You may brush your teeth and gargle the morning of your surgery, but do not swallow the water     Please see your family doctor/pediatrician for a history and physical and/or concerning medications. Bring any test results/reports from your physicians office. If you are under the care of a heart doctor or specialist doctor, please be aware that you may be asked to them for clearance    You may be asked to stop blood thinners such as Coumadin, Plavix, Fragmin, Lovenox, etc., or any anti-inflammatories such as:  Aspirin, Ibuprofen, Advil, Naproxen prior to your surgery. We also ask that you stop any OTC medications such as fish oil, vitamin E, glucosamine, garlic, Multivitamins, COQ 10, etc.    We ask that you do not smoke 24 hours prior to surgery  We ask that you do not  drink any alcoholic beverages 24 hours prior to surgery     You must make arrangements for a responsible adult to take you home after your surgery. For your safety you will not be allowed to leave alone or drive yourself home. Your surgery will be cancelled if you do not have a ride home. Also for your safety, it is strongly suggested that someone stay with you the first 24 hours after your surgery. A parent or legal guardian must accompany a child scheduled for surgery and plan to stay at the hospital until the child is discharged. Please do not bring other children with you. For your comfort, please wear simple loose fitting clothing to the hospital.  Please do not bring valuables.     Do not wear any make-up or nail polish on your fingers or toes      For your safety, please do not wear any jewelry or body piercing's on the day of surgery. All jewelry must be removed. If you have dentures, they will be removed before going to operating room. For your convenience, we will provide you with a container. If you wear contact lenses or glasses, they will be removed, please bring a case for them. If you have a living will and a durable power of  for healthcare, please bring in a copy. As part of our patient safety program to minimize surgical site infections, we ask you to do the following:    Please notify your surgeon if you develop any illness between         now and the  day of your surgery. This includes a cough, cold, fever, sore throat, nausea,         or vomiting, and diarrhea, etc.   Please notify your surgeon if you experience dizziness, shortness         of breath or blurred vision between now and the time of your surgery. Do not shave your operative site 96 hours prior to surgery. For face and neck surgery, men may use an electric razor 48 hours   prior to surgery. You may shower the night before surgery or the morning of   your surgery with an antibacterial soap. You will need to bring a photo ID and insurance card    [facility] has an onsite pharmacy, would you like to utilize our pharmacy     If you will be staying overnight and use a C-pap machine, please bring   your C-pap to hospital     Our goal is to provide you with excellent care, therefore, visitors will be limited to two(2) in the room at a time so that we may focus on providing this care for you. Please contact pre-admission testing if you have any further questions. [facility] phone number:  [phone] PAT fax number:  [phone]  Please note these are generalized instructions for all surgical cases, you may be provided with more specific instructions according to your surgery.     C-Difficile admission screening and protocol:       * Admitted with diarrhea? [] YES    [x]  NO     *Prior history of C-Diff. In last 3 months? [] YES    [x]  NO     *Antibiotic use in the past 6-8 weeks? [x] NO    []  YES                 If yes, which ANTIBIOTIC AND REASON______     *Prior hospitalization or nursing home in the last month? []  YES    [x]  NO        SAFETY FIRST. .call before you fall

## 2022-08-31 NOTE — PROGRESS NOTES
Gwyn Grullon 21   [birth year]  Dr. Arleth Hernandez office called to fax pre-op dos orders and pat order set orders left message with  sylvester Hernandez office called again to fax pre-op antibiotic-per sylvester she will fax them asap and per Dr. Leonor Kemp per sylvester Kemp wants iv ancef

## 2022-09-01 ENCOUNTER — HOSPITAL ENCOUNTER (OUTPATIENT)
Age: 82
Setting detail: OUTPATIENT SURGERY
Discharge: HOME | End: 2022-09-01
Attending: PODIATRIST | Admitting: PODIATRIST
Payer: MEDICARE

## 2022-09-01 VITALS
OXYGEN SATURATION: 99 % | HEART RATE: 64 BPM | WEIGHT: 169.3 LBS | BODY MASS INDEX: 27.21 KG/M2 | RESPIRATION RATE: 20 BRPM | TEMPERATURE: 97.2 F | HEIGHT: 66 IN | DIASTOLIC BLOOD PRESSURE: 81 MMHG | SYSTOLIC BLOOD PRESSURE: 172 MMHG

## 2022-09-01 DIAGNOSIS — M20.41 ACQUIRED HAMMER TOE DEFORMITY OF LESSER TOE OF RIGHT FOOT: ICD-10-CM

## 2022-09-01 DIAGNOSIS — G89.18 POST-OPERATIVE PAIN: Primary | ICD-10-CM

## 2022-09-01 LAB
GLUCOSE BLD-MCNC: 168 MG/DL (ref 70–99)
PERFORMED ON: ABNORMAL

## 2022-09-01 PROCEDURE — 2709999900 HC NON-CHARGEABLE SUPPLY: Performed by: PODIATRIST

## 2022-09-01 PROCEDURE — 3600000013 HC SURGERY LEVEL 3 ADDTL 15MIN: Performed by: PODIATRIST

## 2022-09-01 PROCEDURE — A4217 STERILE WATER/SALINE, 500 ML: HCPCS | Performed by: PODIATRIST

## 2022-09-01 PROCEDURE — 2500000003 HC RX 250 WO HCPCS: Performed by: PODIATRIST

## 2022-09-01 PROCEDURE — 2580000003 HC RX 258: Performed by: PODIATRIST

## 2022-09-01 PROCEDURE — 3600000003 HC SURGERY LEVEL 3 BASE: Performed by: PODIATRIST

## 2022-09-01 PROCEDURE — 7100000010 HC PHASE II RECOVERY - FIRST 15 MIN: Performed by: PODIATRIST

## 2022-09-01 PROCEDURE — 6360000002 HC RX W HCPCS: Performed by: PODIATRIST

## 2022-09-01 PROCEDURE — 7100000011 HC PHASE II RECOVERY - ADDTL 15 MIN: Performed by: PODIATRIST

## 2022-09-01 PROCEDURE — 88300 SURGICAL PATH GROSS: CPT

## 2022-09-01 RX ORDER — MAGNESIUM HYDROXIDE 1200 MG/15ML
LIQUID ORAL CONTINUOUS PRN
Status: COMPLETED | OUTPATIENT
Start: 2022-09-01 | End: 2022-09-01

## 2022-09-01 RX ORDER — HYDROCODONE BITARTRATE AND ACETAMINOPHEN 5; 325 MG/1; MG/1
1 TABLET ORAL EVERY 4 HOURS PRN
Qty: 30 TABLET | Refills: 0 | Status: SHIPPED | OUTPATIENT
Start: 2022-09-01 | End: 2022-09-01 | Stop reason: SDUPTHER

## 2022-09-01 RX ORDER — HYDROCODONE BITARTRATE AND ACETAMINOPHEN 5; 325 MG/1; MG/1
1 TABLET ORAL EVERY 4 HOURS PRN
Qty: 30 TABLET | Refills: 0 | Status: SHIPPED | OUTPATIENT
Start: 2022-09-01 | End: 2022-09-01 | Stop reason: HOSPADM

## 2022-09-01 RX ORDER — LIDOCAINE HYDROCHLORIDE 20 MG/ML
INJECTION, SOLUTION EPIDURAL; INFILTRATION; INTRACAUDAL; PERINEURAL
Status: COMPLETED | OUTPATIENT
Start: 2022-09-01 | End: 2022-09-01

## 2022-09-01 RX ORDER — HYDROCODONE BITARTRATE AND ACETAMINOPHEN 5; 325 MG/1; MG/1
1 TABLET ORAL EVERY 4 HOURS PRN
Qty: 30 TABLET | Refills: 0 | Status: SHIPPED | OUTPATIENT
Start: 2022-09-01 | End: 2022-09-06

## 2022-09-01 RX ORDER — CEPHALEXIN 500 MG/1
500 CAPSULE ORAL 4 TIMES DAILY
Qty: 28 CAPSULE | Refills: 0 | Status: SHIPPED | OUTPATIENT
Start: 2022-09-01 | End: 2022-09-08

## 2022-09-01 RX ORDER — CEPHALEXIN 500 MG/1
500 CAPSULE ORAL 4 TIMES DAILY
Qty: 28 CAPSULE | Refills: 0 | Status: SHIPPED | OUTPATIENT
Start: 2022-09-01 | End: 2022-09-01 | Stop reason: HOSPADM

## 2022-09-01 RX ORDER — CEPHALEXIN 500 MG/1
500 CAPSULE ORAL 4 TIMES DAILY
Qty: 28 CAPSULE | Refills: 0 | Status: SHIPPED | OUTPATIENT
Start: 2022-09-01 | End: 2022-09-01 | Stop reason: SDUPTHER

## 2022-09-01 RX ORDER — SODIUM CHLORIDE 9 MG/ML
INJECTION, SOLUTION INTRAVENOUS CONTINUOUS
Status: DISCONTINUED | OUTPATIENT
Start: 2022-09-01 | End: 2022-09-01 | Stop reason: HOSPADM

## 2022-09-01 RX ORDER — BUPIVACAINE HYDROCHLORIDE 5 MG/ML
INJECTION, SOLUTION EPIDURAL; INTRACAUDAL
Status: COMPLETED | OUTPATIENT
Start: 2022-09-01 | End: 2022-09-01

## 2022-09-01 ASSESSMENT — PAIN DESCRIPTION - PAIN TYPE: TYPE: SURGICAL PAIN;OTHER (COMMENT)

## 2022-09-01 ASSESSMENT — PAIN SCALES - GENERAL
PAINLEVEL_OUTOF10: 3
PAINLEVEL_OUTOF10: 0

## 2022-09-01 ASSESSMENT — PULMONARY FUNCTION TESTS
PIF_VALUE: 0
PIF_VALUE: 0

## 2022-09-01 ASSESSMENT — PAIN - FUNCTIONAL ASSESSMENT: PAIN_FUNCTIONAL_ASSESSMENT: 0-10

## 2022-09-01 NOTE — DISCHARGE INSTRUCTIONS
Dr Christos Purcell Efren Operative Instructions    Office Number:  [phone]  Emergency Pager Number:  [phone]    Fluids and Diet    Begin with clear fluids, dry toast and/or crackers. If not nauseated, resume regular diet. Do not consume alcohol, tranquilizers, sleeping aids or non-prescription pain medication, unless instructed to do so. Medications - see medication list    You may resume your daily medication schedule unless otherwise instructed. If a rash develops or other adverse reaction occurs, discontinue medication and contact Dr. Maurice Hinojosa immediately. Review the drug leaflet information, when provided, before taking the prescribed medication. Ambulation and Activity    You are advised to go directly home after surgery. Use Walker as directed. Your weight bearing status is Partial weight bearing with heel touch only to right LE  Your surgical shoe MUST BE WORN AT ALL TIMES, even while sleeping. Care of the Operative Are - Important!! Keep dressing or cast clean, dry and intact. Do not remove bandage. Do not place anything inside the bandage. Do not get the bandage wet. Elevate limb above heart to control swelling and reduce pain. Apply ice over the ankle or behind the knee for 20 minutes / hour while awake. Special Instructions - Call Dr. Maurice Hinojosa if you develop any of the following:    Fever over 100 degrees F - take your temperature daily for 4 days after surgery. Pain not relieved by medications. Excessive swelling or increased redness around the surgical site. Cold toes or white/blue discoloration of toes. Bruising is normal.  Bandage becomes wet, soiled or soaked in blood. A small amount of blood is normal.    Follow up instructions: You will need to follow up with Dr. Christos Shore office in the next 5 to 7 days. Call [phone] when you get home to make an appointment. Call Dr. Christos Shore office if you have any questions or concerns.     Dr. Migue Hutchinson DPKAREL  [facility]  Office: [phone]  Fax: [phone]

## 2022-09-01 NOTE — OP NOTE
[address]           [address] [facility]                                OPERATIVE REPORT    PATIENT NAME: Melva Sun                 :        1940  MED REC NO:   [id number]                          ROOM:  ACCOUNT NO:   [de-identified]                           ADMIT DATE: 2022  PROVIDER:     Nohemi Brown DPM    DATE OF PROCEDURE:  2022    PREOPERATIVE DIAGNOSIS:  Hammertoe deformity, right second digit. POSTOPERATIVE DIAGNOSIS:  Hammertoe deformity, right second digit. OPERATION PERFORMED:  Amputation of right second digit. PATHOLOGY:  Amputated digit was sent to Pathology for gross microscopic  examination. ANESTHESIA:  Local block consisting a total of 20 mL of 2% lidocaine  plain. Postoperative injection of 30 mL of 0.5% Marcaine plain. HEMOSTASIS:  Electrocauterization, anatomic dissection. ESTIMATED BLOOD LOSS:  Less than 10 mL. SURGEON:  Nohemi Brown DPM    ASSISTANT:  Horace Santoro DPM, PGY-2    INDICATIONS:  The patient presented with a painful hammertoe deformity,  and after extensive consultation regarding a reconstruction to correct  the deformity, the patient relates she would not be able to be off of  her foot for that long, citing she would like to be able to drive sooner  than that off of her amputation as it would eliminate the painful  overlapping digit. The patient wished to proceed with amputation of the  toe in order to avoid a surgical reconstruction and the associated  recovery. All risks versus benefits of the planned procedure were  discussed with the patient in detail. All questions were answered, no  guarantees were given. OPERATIVE PROCEDURE:  The patient was brought from the preoperative area  and placed on the operating table in the supine position. The digit was  then anesthetized using a total of 20 mL of 2% lidocaine plain.   Once  local anesthesia had been achieved, the patient's right lower extremity  was scrubbed, prepped, and draped in the usual sterile manner. A  time-out was performed. The patient, procedure, and operative site were  confirmed and the following procedure was then performed. PROCEDURE DETAILS:  Amputation of right second toe: At this time,  attention was directed towards the patient's right second toe going  through an original incision where she had a previous intervention. The  digit was disarticulated at the level of the metatarsophalangeal joint  with two fish-mouth type incisions. It was then extirpated in total  from the surgical site. The long flexor and extensor tendons were  grasped and pulled distally as far as the incision would allow and  transected. Hemostasis was achieved with electrocauterization. At this  time, the wound was irrigated with copious amounts of normal sterile  saline. It was then closed in layers using 3-0 Vicryl suture and 3-0  nylon suture. Upon completion of wound closure, a postoperative  injection consisting a total of 30 mL of 0.5% Marcaine plain was  administered throughout the surgical incision site. The wound was then  dressed with Betadine-soaked Adaptic, sterile 4x4s, 4x8s, sterile  Kerlix, and Mare. A gentle compression dressing was applied to the  patient's right lower extremity and a postoperative shoe was dispensed. The patient tolerated the procedure well. The patient left the OR with  vital signs stable and vascular status intact to all the remaining  digits of the right foot. Following a period of postoperative  monitoring, she will be discharged to home with oral and written  instructions by myself. She will follow up in my office in three to  five days for her first postoperative visit.         Guanaco Orona DPM    D: 09/01/2022 14:01:44       T: 09/01/2022 16:30:31     [email or web address]  Job#: [id number]     Doc#: [id number]    CC:  Paula Bobby DPM

## 2022-09-01 NOTE — BRIEF OP NOTE
Brief Postoperative Note      Patient: Siri Noyola  YOB: 1940  MRN: [id number]    Date of Procedure: 9/1/2022    Pre-Op Diagnosis: HAMMERTOE DEFORMITY SECOND DIGIT RIGHT FOOT    Post-Op Diagnosis: Same       Procedure(s):  AMPUTATION OF SECOND DIGIT RIGHT FOOT    Surgeon(s):  Jerry Perry DPM    Assistant:  Resident: Paulette Rubio DPM  Surgical Assistant: Martir You    Anesthesia: Local    Hemostasis: Anatomic Dissection     Injectables: Pre-Op 20 cc of 2% Lidocaine plain and Post-Op 30 cc of 0.5 % Marcaine plain    Materials: 4-0 Vicryl  and 3-0 Nylon    Estimated Blood Loss (mL): less than 50     Complications: None    Specimens:   ID Type Source Tests Collected by Time Destination   A : right second digit  Specimen Toe SURGICAL PATHOLOGY Jerry Perry DPM 9/1/2022 1246        Implants:  * No implants in log *      Drains: * No LDAs found *    Findings: 4th digit amputated. 4th metatarsal head was firm, hard and white. Adequate bleeding noted. No purulence noted.      Electronically signed by Paulette Rubio DPM on 9/1/2022 at 1:23 PM

## 2022-09-01 NOTE — H&P
Department of General Surgery    Surgical Service   Local History and Physical     Pillo Marcial  1940      Mental Status:  AAO x3      Operative Site:  right 2nd toe amputation site verified by patient and marked by me. Comorbidities:  DM, GERD, OA, Hyperlipidemia  Patient's DNR was suspended during the perioperative period.      Barbara Wise DPM

## 2022-09-07 ENCOUNTER — PATIENT MESSAGE (OUTPATIENT)
Dept: FAMILY MEDICINE CLINIC | Age: 82
End: 2022-09-07

## 2022-09-07 DIAGNOSIS — N39.3 STRESS INCONTINENCE IN FEMALE: Primary | ICD-10-CM

## 2022-09-07 RX ORDER — OXYBUTYNIN CHLORIDE 10 MG/1
10 TABLET, EXTENDED RELEASE ORAL DAILY
Qty: 30 TABLET | Refills: 3 | Status: SHIPPED | OUTPATIENT
Start: 2022-09-07 | End: 2022-10-13

## 2022-09-07 NOTE — TELEPHONE ENCOUNTER
S there a reasonable substitute for the Vessicre? Not coverec by [employer] ins. Needs changed.

## 2022-09-07 NOTE — TELEPHONE ENCOUNTER
From: Evelyn Arias  To: Dr. Bhumi Toussaint  Sent: 9/7/2022 11:31 AM EDT  Subject: Solfenisen(?)    Since I went to [place] last week and was told RX not filled due to cost by Torri Payan I have no idea what the status of this is. My landline does not accept robo calls so I don't get the [employer]. Did you find a reasonable substitute?   Thanks

## 2022-09-26 DIAGNOSIS — E11.9 TYPE 2 DIABETES MELLITUS WITHOUT COMPLICATION, WITHOUT LONG-TERM CURRENT USE OF INSULIN (HCC): ICD-10-CM

## 2022-09-26 DIAGNOSIS — I10 ESSENTIAL HYPERTENSION: ICD-10-CM

## 2022-09-26 DIAGNOSIS — E78.5 HYPERLIPIDEMIA LDL GOAL <100: ICD-10-CM

## 2022-09-26 RX ORDER — METFORMIN HYDROCHLORIDE 500 MG/1
1000 TABLET, EXTENDED RELEASE ORAL 2 TIMES DAILY
Qty: 360 TABLET | Refills: 1 | Status: SHIPPED | OUTPATIENT
Start: 2022-09-26

## 2022-09-26 RX ORDER — LOSARTAN POTASSIUM 25 MG/1
25 TABLET ORAL DAILY
Qty: 90 TABLET | Refills: 1 | Status: SHIPPED | OUTPATIENT
Start: 2022-09-26

## 2022-09-26 RX ORDER — ATORVASTATIN CALCIUM 80 MG/1
80 TABLET, FILM COATED ORAL DAILY
Qty: 90 TABLET | Refills: 1 | Status: SHIPPED | OUTPATIENT
Start: 2022-09-26

## 2022-10-13 ENCOUNTER — OFFICE VISIT (OUTPATIENT)
Dept: PULMONOLOGY | Age: 82
End: 2022-10-13
Payer: MEDICARE

## 2022-10-13 VITALS
TEMPERATURE: 96.8 F | DIASTOLIC BLOOD PRESSURE: 80 MMHG | SYSTOLIC BLOOD PRESSURE: 120 MMHG | HEIGHT: 66 IN | OXYGEN SATURATION: 96 % | BODY MASS INDEX: 27.38 KG/M2 | HEART RATE: 70 BPM | RESPIRATION RATE: 21 BRPM | WEIGHT: 170.4 LBS

## 2022-10-13 DIAGNOSIS — Z99.89 OSA ON CPAP: Primary | ICD-10-CM

## 2022-10-13 DIAGNOSIS — G47.33 OSA ON CPAP: Primary | ICD-10-CM

## 2022-10-13 PROCEDURE — 1123F ACP DISCUSS/DSCN MKR DOCD: CPT | Performed by: INTERNAL MEDICINE

## 2022-10-13 PROCEDURE — 99213 OFFICE O/P EST LOW 20 MIN: CPT | Performed by: INTERNAL MEDICINE

## 2022-10-13 ASSESSMENT — SLEEP AND FATIGUE QUESTIONNAIRES
HOW LIKELY ARE YOU TO NOD OFF OR FALL ASLEEP IN A CAR, WHILE STOPPED FOR A FEW MINUTES IN TRAFFIC: 0
HOW LIKELY ARE YOU TO NOD OFF OR FALL ASLEEP WHILE WATCHING TV: 1
HOW LIKELY ARE YOU TO NOD OFF OR FALL ASLEEP WHILE SITTING QUIETLY AFTER LUNCH WITHOUT ALCOHOL: 0
HOW LIKELY ARE YOU TO NOD OFF OR FALL ASLEEP WHEN YOU ARE A PASSENGER IN A CAR FOR AN HOUR WITHOUT A BREAK: 0
HOW LIKELY ARE YOU TO NOD OFF OR FALL ASLEEP WHILE LYING DOWN TO REST IN THE AFTERNOON WHEN CIRCUMSTANCES PERMIT: 0
HOW LIKELY ARE YOU TO NOD OFF OR FALL ASLEEP WHILE SITTING AND TALKING TO SOMEONE: 0
HOW LIKELY ARE YOU TO NOD OFF OR FALL ASLEEP WHILE SITTING AND READING: 2
HOW LIKELY ARE YOU TO NOD OFF OR FALL ASLEEP WHILE SITTING INACTIVE IN A PUBLIC PLACE: 0
ESS TOTAL SCORE: 3

## 2022-10-13 NOTE — PROGRESS NOTES
Chief complaint  This is a 79y.o. year old female  who comes to see me with a chief complaint of   Chief Complaint   Patient presents with    Follow-up     CPAP    . HPI  Follow up on Moderate MARILYN with RDI of 26    Machine:  Patient is using a CPAP machine. Average usage is 6 hrs 53 min 00 sec. She is meeting 4 or more hours per night 100% of the time. Average AHI is 0.6     For the most part sleep ok. Does have some days where she cannot fall asleep and has to get out of bed to read etc.       Sleep Medicine 10/13/2022 7/7/2021 4/8/2021 5/4/2020 8/22/2018 4/19/2017 4/20/2016   Sitting and reading 2 2 2 2 2 2 2   Watching TV 1 2 1 0 1 1 2   Sitting, inactive in a public place (e.g. a theatre or a meeting) 0 0 0 0 0 0 0   As a passenger in a car for an hour without a break 0 0 1 0 0 0 0   Lying down to rest in the afternoon when circumstances permit 0 0 0 1 1 1 1   Sitting and talking to someone 0 0 0 0 0 0 0   Sitting quietly after a lunch without alcohol 0 0 0 0 0 0 0   In a car, while stopped for a few minutes in traffic 0 0 0 0 0 0 0   [place] Sleepiness Score 3 4 4 3 4 4 5   Neck circumference (Inches) - - - - 13.75 - -       PHYSICAL EXAM:  GENERAL:  Well nourished, alert, appears stated age, no distress. Calm  HEENT:  No apparent conjunctival irritation, extra-ocular muscles seem intact. NECK:  No masses visibile, no torticollis   LYMPH:  No obvious masses  LUNGS:  Clear bilaterally   ABDOMEN:  No gross apparent distention  EXTREMITIES:  No visibile swelling, no clubbing   NEURO:  No localizing deficits, CN II-XII intact. No aphasia noted   SKIN:  no visible rashes on exposed skin  PSYCH:  no hallucinations, normal affect      Assessment/Plan  1. MARILYN on CPAP  Benefiting and compliant. Benefiting from therapy by a low [place] score, good energy levels, low fatigue, and control of chronic medical problems    Follow up in 12 months                      .

## 2022-10-14 ENCOUNTER — HOSPITAL ENCOUNTER (OUTPATIENT)
Dept: WOMENS IMAGING | Age: 82
Discharge: HOME | End: 2022-10-14
Payer: MEDICARE

## 2022-10-14 DIAGNOSIS — Z12.31 OTHER SCREENING MAMMOGRAM: ICD-10-CM

## 2022-10-14 PROCEDURE — 77067 SCR MAMMO BI INCL CAD: CPT

## 2022-10-17 ENCOUNTER — OFFICE VISIT (OUTPATIENT)
Dept: FAMILY MEDICINE CLINIC | Age: 82
End: 2022-10-17
Payer: MEDICARE

## 2022-10-17 VITALS
BODY MASS INDEX: 26.84 KG/M2 | OXYGEN SATURATION: 99 % | HEART RATE: 71 BPM | DIASTOLIC BLOOD PRESSURE: 62 MMHG | WEIGHT: 167 LBS | SYSTOLIC BLOOD PRESSURE: 120 MMHG | HEIGHT: 66 IN

## 2022-10-17 DIAGNOSIS — D50.9 IRON DEFICIENCY ANEMIA, UNSPECIFIED IRON DEFICIENCY ANEMIA TYPE: ICD-10-CM

## 2022-10-17 DIAGNOSIS — E11.40 TYPE 2 DIABETES MELLITUS WITH DIABETIC NEUROPATHY, WITHOUT LONG-TERM CURRENT USE OF INSULIN (HCC): ICD-10-CM

## 2022-10-17 DIAGNOSIS — E11.40 TYPE 2 DIABETES MELLITUS WITH DIABETIC NEUROPATHY, WITHOUT LONG-TERM CURRENT USE OF INSULIN (HCC): Primary | ICD-10-CM

## 2022-10-17 DIAGNOSIS — E78.5 HYPERLIPIDEMIA LDL GOAL <100: Chronic | ICD-10-CM

## 2022-10-17 DIAGNOSIS — R92.8 ABNORMAL MAMMOGRAM OF RIGHT BREAST: ICD-10-CM

## 2022-10-17 PROBLEM — E11.22 TYPE 2 DIABETES MELLITUS WITH CHRONIC KIDNEY DISEASE (HCC): Status: RESOLVED | Noted: 2022-08-19 | Resolved: 2022-10-17

## 2022-10-17 PROBLEM — E11.9 TYPE 2 DIABETES MELLITUS WITHOUT COMPLICATION, WITHOUT LONG-TERM CURRENT USE OF INSULIN (HCC): Status: RESOLVED | Noted: 2021-09-16 | Resolved: 2022-10-17

## 2022-10-17 LAB
A/G RATIO: 1.8 (ref 1.1–2.2)
ALBUMIN SERPL-MCNC: 4 G/DL (ref 3.4–5)
ALP BLD-CCNC: 122 U/L (ref 40–129)
ALT SERPL-CCNC: 15 U/L (ref 10–40)
ANION GAP SERPL CALCULATED.3IONS-SCNC: 13 MMOL/L (ref 3–16)
AST SERPL-CCNC: 16 U/L (ref 15–37)
BILIRUB SERPL-MCNC: 0.9 MG/DL (ref 0–1)
BUN BLDV-MCNC: 18 MG/DL (ref 7–20)
CALCIUM SERPL-MCNC: 9.6 MG/DL (ref 8.3–10.6)
CHLORIDE BLD-SCNC: 105 MMOL/L (ref 99–110)
CHOLESTEROL, TOTAL: 162 MG/DL (ref 0–199)
CO2: 23 MMOL/L (ref 21–32)
CREAT SERPL-MCNC: 0.7 MG/DL (ref 0.6–1.2)
GFR AFRICAN AMERICAN: >60
GFR NON-AFRICAN AMERICAN: >60
GLUCOSE BLD-MCNC: 168 MG/DL (ref 70–99)
HBA1C MFR BLD: 6.9 %
HDLC SERPL-MCNC: 85 MG/DL (ref 40–60)
LDL CHOLESTEROL CALCULATED: 60 MG/DL
POTASSIUM SERPL-SCNC: 4.4 MMOL/L (ref 3.5–5.1)
SODIUM BLD-SCNC: 141 MMOL/L (ref 136–145)
TOTAL PROTEIN: 6.2 G/DL (ref 6.4–8.2)
TRIGL SERPL-MCNC: 85 MG/DL (ref 0–150)
VLDLC SERPL CALC-MCNC: 17 MG/DL

## 2022-10-17 PROCEDURE — 83036 HEMOGLOBIN GLYCOSYLATED A1C: CPT | Performed by: FAMILY MEDICINE

## 2022-10-17 PROCEDURE — 3051F HG A1C>EQUAL 7.0%<8.0%: CPT | Performed by: FAMILY MEDICINE

## 2022-10-17 PROCEDURE — 99213 OFFICE O/P EST LOW 20 MIN: CPT | Performed by: FAMILY MEDICINE

## 2022-10-17 PROCEDURE — 1123F ACP DISCUSS/DSCN MKR DOCD: CPT | Performed by: FAMILY MEDICINE

## 2022-10-17 NOTE — PATIENT INSTRUCTIONS
INSTRUCTIONS  NEXT APPOINTMENT: Please schedule check-up in 4 months    PLEASE GET BLOODWORK DRAWN TODAY ON FIRST FLOOR in 170. Take orders with you. RESULTS- most blood tests back in couple days. We will call you if any problems. If bloodwork good, you will get letter in mail or notified thru [address] (if signed up) within 2 weeks. If you do not, please call office. Schedule diagnostic mammogram. To schedule at a [facility] facility, please call [phone].    Please have eye doctor fax last eye exam to [phone]

## 2022-10-17 NOTE — PROGRESS NOTES
25 mg, Oral, DAILY    metFORMIN (GLUCOPHAGE-XR) 1,000 mg, Oral, 2 TIMES DAILY     LAST LABS  Lab Results   Component Value Date    LDLCALC 56 09/17/2021     Lab Results   Component Value Date    HDL 73 (H) 09/17/2021     Lab Results   Component Value Date    TRIG 76 09/17/2021     Lab Results   Component Value Date     04/05/2022    K 3.8 04/05/2022    CREATININE 0.6 04/05/2022     Lab Results   Component Value Date    WBC 3.8 (L) 08/09/2022    HGB 11.1 (L) 08/09/2022     08/09/2022     Lab Results   Component Value Date    ALT 16 04/05/2022    AST 19 04/05/2022    ALKPHOS 107 04/05/2022    BILITOT 1.2 (H) 04/05/2022     TSH (uIU/mL)   Date Value   08/03/2020 1.12     Lab Results   Component Value Date    GLUCOSE 164 (H) 04/05/2022     Lab Results   Component Value Date    LABA1C 7.1 07/12/2022    LABA1C 7.6 12/21/2021    LABA1C 7.1 09/16/2021     Objective:   PHYSICAL EXAM   /62 (Site: Left Upper Arm, Position: Sitting, Cuff Size: Medium Adult)   Pulse 71   Ht 5' 6\" (1.676 m)   Wt 167 lb (75.8 kg)   SpO2 99%   BMI 26.95 kg/m²   BP Readings from Last 5 Encounters:   10/17/22 120/62   10/13/22 120/80   09/01/22 (!) 172/81   08/19/22 130/72   07/12/22 138/66     Wt Readings from Last 5 Encounters:   10/17/22 167 lb (75.8 kg)   10/13/22 170 lb 6.4 oz (77.3 kg)   09/01/22 169 lb 4.8 oz (76.8 kg)   08/19/22 170 lb (77.1 kg)   07/12/22 170 lb (77.1 kg)      GENERAL:   well-developed, well-nourished, alert, no distress.      LUNGS:    Breathing unlabored  clear to auscultation bilaterally and good air movement  CARDIOVASC:   regular rate and rhythm  SKIN: warm and dry

## 2022-10-24 ENCOUNTER — PATIENT MESSAGE (OUTPATIENT)
Dept: FAMILY MEDICINE CLINIC | Age: 82
End: 2022-10-24

## 2022-11-15 ENCOUNTER — HOSPITAL ENCOUNTER (OUTPATIENT)
Dept: WOMENS IMAGING | Age: 82
Discharge: HOME | End: 2022-11-15
Payer: MEDICARE

## 2022-11-15 DIAGNOSIS — R92.8 ABNORMAL MAMMOGRAM OF RIGHT BREAST: ICD-10-CM

## 2022-11-15 PROCEDURE — G0279 TOMOSYNTHESIS, MAMMO: HCPCS

## 2023-01-09 ENCOUNTER — PATIENT MESSAGE (OUTPATIENT)
Dept: FAMILY MEDICINE CLINIC | Age: 83
End: 2023-01-09

## 2023-02-06 DIAGNOSIS — D50.9 IRON DEFICIENCY ANEMIA, UNSPECIFIED IRON DEFICIENCY ANEMIA TYPE: ICD-10-CM

## 2023-02-06 RX ORDER — FERROUS SULFATE 325(65) MG
325 TABLET ORAL 2 TIMES DAILY
Qty: 180 TABLET | Refills: 1 | Status: SHIPPED | OUTPATIENT
Start: 2023-02-06

## 2023-02-11 ENCOUNTER — PATIENT MESSAGE (OUTPATIENT)
Dept: FAMILY MEDICINE CLINIC | Age: 83
End: 2023-02-11

## 2023-02-11 DIAGNOSIS — G89.29 CHRONIC KNEE PAIN AFTER TOTAL REPLACEMENT OF LEFT KNEE JOINT: Primary | ICD-10-CM

## 2023-02-11 DIAGNOSIS — Z96.652 CHRONIC KNEE PAIN AFTER TOTAL REPLACEMENT OF LEFT KNEE JOINT: Primary | ICD-10-CM

## 2023-02-11 DIAGNOSIS — M25.562 CHRONIC KNEE PAIN AFTER TOTAL REPLACEMENT OF LEFT KNEE JOINT: Primary | ICD-10-CM

## 2023-02-13 NOTE — TELEPHONE ENCOUNTER
From: Anuradha Canseco  To: Dr. Hallie Bell  Sent: 2/11/2023 11:08 AM EST  Subject: Referral     My replacement knee has been bothering me a lot lately so I have scheduled an appointment with Dr. Su Quintanilla for Thursday afternoon. Will you ok a referral if Anita Pallas requires one? Thanks.  See you in the office on Friday

## 2023-02-17 ENCOUNTER — OFFICE VISIT (OUTPATIENT)
Dept: FAMILY MEDICINE CLINIC | Age: 83
End: 2023-02-17

## 2023-02-17 VITALS
DIASTOLIC BLOOD PRESSURE: 60 MMHG | BODY MASS INDEX: 28.16 KG/M2 | SYSTOLIC BLOOD PRESSURE: 130 MMHG | HEART RATE: 68 BPM | OXYGEN SATURATION: 98 % | WEIGHT: 169 LBS | HEIGHT: 65 IN

## 2023-02-17 DIAGNOSIS — E11.40 TYPE 2 DIABETES MELLITUS WITH DIABETIC NEUROPATHY, WITHOUT LONG-TERM CURRENT USE OF INSULIN (HCC): Primary | ICD-10-CM

## 2023-02-17 DIAGNOSIS — M25.562 CHRONIC KNEE PAIN AFTER TOTAL REPLACEMENT OF LEFT KNEE JOINT: ICD-10-CM

## 2023-02-17 DIAGNOSIS — M15.9 GENERALIZED OSTEOARTHRITIS: ICD-10-CM

## 2023-02-17 DIAGNOSIS — I10 ESSENTIAL HYPERTENSION: ICD-10-CM

## 2023-02-17 DIAGNOSIS — Z96.652 CHRONIC KNEE PAIN AFTER TOTAL REPLACEMENT OF LEFT KNEE JOINT: ICD-10-CM

## 2023-02-17 DIAGNOSIS — E78.5 HYPERLIPIDEMIA LDL GOAL <100: Chronic | ICD-10-CM

## 2023-02-17 DIAGNOSIS — D50.9 IRON DEFICIENCY ANEMIA, UNSPECIFIED IRON DEFICIENCY ANEMIA TYPE: ICD-10-CM

## 2023-02-17 DIAGNOSIS — K21.9 GASTROESOPHAGEAL REFLUX DISEASE WITHOUT ESOPHAGITIS: ICD-10-CM

## 2023-02-17 DIAGNOSIS — G89.29 CHRONIC KNEE PAIN AFTER TOTAL REPLACEMENT OF LEFT KNEE JOINT: ICD-10-CM

## 2023-02-17 PROBLEM — K58.0 IRRITABLE BOWEL SYNDROME WITH DIARRHEA: Status: RESOLVED | Noted: 2021-03-24 | Resolved: 2023-02-17

## 2023-02-17 LAB — HBA1C MFR BLD: 8.3 %

## 2023-02-17 RX ORDER — M-VIT,TX,IRON,MINS/CALC/FOLIC 27MG-0.4MG
1 TABLET ORAL DAILY
COMMUNITY

## 2023-02-17 ASSESSMENT — ENCOUNTER SYMPTOMS
SHORTNESS OF BREATH: 0
ABDOMINAL PAIN: 0

## 2023-02-17 ASSESSMENT — PATIENT HEALTH QUESTIONNAIRE - PHQ9
2. FEELING DOWN, DEPRESSED OR HOPELESS: 0
SUM OF ALL RESPONSES TO PHQ9 QUESTIONS 1 & 2: 0
SUM OF ALL RESPONSES TO PHQ QUESTIONS 1-9: 0
1. LITTLE INTEREST OR PLEASURE IN DOING THINGS: 0
SUM OF ALL RESPONSES TO PHQ QUESTIONS 1-9: 0

## 2023-02-17 NOTE — PROGRESS NOTES
2/17/2023    This is a 79 y.o. female   Chief Complaint   Patient presents with    Diabetes     4 mo f/u DM   . HPI    In RSV vaccine study. Arthritis is getting worse in hands, mostly stiff in AM.  Taking aleve PRN for pain    Had steroid injection in knee yesterday for bursitis- Dr[facility] Phyllis Joseph at [facility]. Diabetes Mellitus Type 2: Current symptoms/problems include none. Home blood sugar records: patient does not test  Any episodes of hypoglycemia? Does not test  Eye exam current (within one year): yes  Tobacco history: She  reports that she has never smoked. She has never used smokeless tobacco.   Has not been following low carb diet- eating candy, popcorn and pop at basketball games    Hypertension:  Home blood pressure monitoring: No.  She is adherent to a low sodium diet. Patient denies chest pain, shortness of breath, headache, lightheadedness, blurred vision, peripheral edema, palpitations, dry cough, and fatigue. Antihypertensive medication side effects: no medication side effects noted. Use of agents associated with hypertension: none. Hyperlipidemia:  No new myalgias or GI upset on atorvastatin (Lipitor).        Lab Results   Component Value Date    LABA1C 6.9 10/17/2022    LABA1C 7.1 07/12/2022    LABA1C 7.6 12/21/2021     Lab Results   Component Value Date    LABMICR <1.20 03/17/2015    LABMICR YES 03/17/2015    CREATININE 0.7 10/17/2022     Lab Results   Component Value Date    ALT 15 10/17/2022    AST 16 10/17/2022     Lab Results   Component Value Date    CHOL 162 10/17/2022    TRIG 85 10/17/2022    HDL 85 (H) 10/17/2022    LDLCALC 60 10/17/2022           Patient Active Problem List   Diagnosis    Hyperlipidemia LDL goal <100    Osteopenia- DXA nl 6/12    Stress incontinence in female    Generalized osteoarthritis    Allergic rhinitis    Spondylosis    Obstructive sleep apnea syndrome    Iron deficiency anemia- neg col 9/2020    Lumbar disc disease with L 5 radiculopathy    Retrolisthesis L5-S1    Gastroesophageal reflux disease without esophagitis    Chondromalacia patellae    Nephrolithiasis    Lumbosacral radiculopathy at L5 right    S/P lumbar spinal fusion    Dysplastic nevus of trunk    Hx of total knee replacement, left    Irritable bowel syndrome with diarrhea    Low back pain    Type 2 diabetes mellitus with diabetic neuropathy    Essential hypertension       Current Outpatient Medications   Medication Sig Dispense Refill    Multiple Vitamins-Minerals (THERAPEUTIC MULTIVITAMIN-MINERALS) tablet Take 1 tablet by mouth daily      diclofenac sodium (VOLTAREN) 1 % GEL Apply 2 g topically 4 times daily 50 g 0    ferrous sulfate (IRON 325) 325 (65 Fe) MG tablet Take 1 tablet by mouth 2 times daily 180 tablet 1    atorvastatin (LIPITOR) 80 MG tablet Take 1 tablet by mouth daily 90 tablet 1    losartan (COZAAR) 25 MG tablet Take 1 tablet by mouth daily 90 tablet 1    metFORMIN (GLUCOPHAGE-XR) 500 MG extended release tablet Take 2 tablets by mouth 2 times daily 360 tablet 1    Lancets MISC 1 each by Does not apply route 2 times daily 200 each 3    blood glucose monitor kit and supplies Test 2 times a day & as needed for symptoms of irregular blood glucose. 1 kit 0    blood glucose monitor strips Test 2 times a day & as needed for symptoms of irregular blood glucose. 200 strip 3    CPAP Machine MISC by Does not apply route       No current facility-administered medications for this visit.       Allergies   Allergen Reactions    Simvastatin Myalgia       Review of Systems   Constitutional:  Negative for activity change and fever.   Respiratory:  Negative for shortness of breath.    Cardiovascular:  Negative for chest pain, palpitations and leg swelling.   Gastrointestinal:  Negative for abdominal pain.   Musculoskeletal:  Positive for arthralgias.   Neurological:  Negative for dizziness and headaches.     Vitals:    02/17/23 0951 02/17/23 1032   BP: (!) 150/80 130/60   Site: Right Upper Arm Right Upper  Arm   Position: Sitting Sitting   Cuff Size: Medium Adult Medium Adult   Pulse: 68    SpO2: 98%    Weight: 169 lb (76.7 kg)    Height: 5' 5\" (1.651 m)        Body mass index is 28.12 kg/m². Wt Readings from Last 3 Encounters:   02/17/23 169 lb (76.7 kg)   10/17/22 167 lb (75.8 kg)   10/13/22 170 lb 6.4 oz (77.3 kg)       BP Readings from Last 3 Encounters:   02/17/23 130/60   10/17/22 120/62   10/13/22 120/80       Physical Exam  Vitals reviewed. Constitutional:       General: She is not in acute distress. Appearance: Normal appearance. HENT:      Head: Normocephalic and atraumatic. Cardiovascular:      Rate and Rhythm: Normal rate and regular rhythm. Heart sounds: Normal heart sounds. No murmur heard. No friction rub. No gallop. Pulmonary:      Effort: Pulmonary effort is normal. No respiratory distress. Breath sounds: Normal breath sounds. Musculoskeletal:         General: Normal range of motion. Right lower leg: No edema. Left lower leg: No edema. Skin:     General: Skin is warm and dry. Neurological:      Mental Status: She is oriented to person, place, and time. Psychiatric:         Behavior: Behavior normal.         Thought Content: Thought content normal.         Judgment: Judgment normal.       Assessmentand Plan  Fernanda Sherman was seen today for diabetes.     Diagnoses and all orders for this visit:    Type 2 diabetes mellitus with diabetic neuropathy, without long-term current use of insulin ([facility])  -     POCT glycosylated hemoglobin (Hb A1C)  HgbA1C 8.3% today  Uncontrolled- given age, would like to see her around 7.5%  Advised low carb diet and aerobic exercise as tolerated  Continue metformin 2000 mg daily  Will have MA get most recent diabetic retinal exam from her eye doctor  Essential hypertension  Well controlled, tolerating medications, no changes  Hyperlipidemia LDL goal <100  Well controlled, tolerating medications, no changes  Iron deficiency anemia, unspecified iron deficiency anemia type  -     CBC with Auto Differential; Future  -     Ferritin; Future  -     Iron and TIBC; Future  Taking oral iron BID  Will re-check levels- medication changes pending lab results  Gastroesophageal reflux disease without esophagitis  Well controlled, tolerating medications, no changes  Chronic knee pain after total replacement of left knee joint  Per Dr. Butcher Form at [facility] osteoarthritis  -     diclofenac sodium (VOLTAREN) 1 % GEL; Apply 2 g topically 4 times daily  She declines oral NSAID  Can try topical NSAID  She is planning to discuss with her orthopedic doctor- may want to try injections in future    Return in about 3 months (around 5/17/2023) for AWV/diabetes.

## 2023-02-18 ENCOUNTER — PATIENT MESSAGE (OUTPATIENT)
Dept: FAMILY MEDICINE CLINIC | Age: 83
End: 2023-02-18

## 2023-02-18 DIAGNOSIS — E11.49 DIABETES MELLITUS TYPE 2 WITH NEUROLOGICAL MANIFESTATIONS (HCC): ICD-10-CM

## 2023-02-20 RX ORDER — GLUCOSAMINE HCL/CHONDROITIN SU 500-400 MG
CAPSULE ORAL
Qty: 200 STRIP | Refills: 3 | Status: SHIPPED | OUTPATIENT
Start: 2023-02-20

## 2023-02-20 RX ORDER — LANCETS 30 GAUGE
1 EACH MISCELLANEOUS 2 TIMES DAILY
Qty: 200 EACH | Refills: 3 | Status: SHIPPED | OUTPATIENT
Start: 2023-02-20

## 2023-03-04 ENCOUNTER — PATIENT MESSAGE (OUTPATIENT)
Dept: FAMILY MEDICINE CLINIC | Age: 83
End: 2023-03-04

## 2023-03-06 RX ORDER — SOLIFENACIN SUCCINATE 10 MG/1
10 TABLET, FILM COATED ORAL DAILY
Qty: 90 TABLET | Refills: 0 | Status: SHIPPED | OUTPATIENT
Start: 2023-03-06

## 2023-03-06 NOTE — TELEPHONE ENCOUNTER
From: Kristie Goes  To: Dr. Keyona Fonseca  Sent: 3/4/2023 1:36 PM EST  Subject: Solifenacin    Have we discontinued this medication? I do not see it on my list and the current supply will be gone soon. 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

## 2023-04-01 ENCOUNTER — PATIENT MESSAGE (OUTPATIENT)
Dept: FAMILY MEDICINE CLINIC | Age: 83
End: 2023-04-01

## 2023-04-17 DIAGNOSIS — I10 ESSENTIAL HYPERTENSION: ICD-10-CM

## 2023-04-17 RX ORDER — LOSARTAN POTASSIUM 25 MG/1
25 TABLET ORAL DAILY
Qty: 90 TABLET | Refills: 1 | Status: SHIPPED | OUTPATIENT
Start: 2023-04-17

## 2023-04-19 ENCOUNTER — TELEPHONE (OUTPATIENT)
Dept: PHARMACY | Facility: CLINIC | Age: 83
End: 2023-04-19

## 2023-04-19 NOTE — TELEPHONE ENCOUNTER
Status   10/17/2022 15 10 - 40 U/L Final     AST   Date Value Ref Range Status   10/17/2022 16 15 - 37 U/L Final     The ASCVD Risk score (Filomena DK, et al., 2019) failed to calculate for the following reasons: The 2019 ASCVD risk score is only valid for ages 36 to 78     PLAN  Per insurer report, LIS-0 - co-pays are based on tiers and patient is subject to coverage gap. Patient found in Outcomes MTM and is currently eligible for 3 TIP  TIPS Completed    The following are interventions that have been identified:   Refill/s of losartan READY to  at patient's [facility] patient to review. Patient confirmed she will  losartan    Last Visit: 23  Next Visit: 23    For Pharmacy Admin Tracking Only    Program: [address] in place:  No  Recommendation Provided To: Patient/Caregiver: 1 via Telephone and Pharmacy: 1  Intervention Detail: Adherence Monitorin  Intervention Accepted By: Patient/Caregiver: 1 and Pharmacy: 1  Gap Closed?: Yes   Time Spent (min): Sherrill 43 MA.    [facility] free: [phone]

## 2023-05-19 SDOH — HEALTH STABILITY: PHYSICAL HEALTH
ON AVERAGE, HOW MANY DAYS PER WEEK DO YOU ENGAGE IN MODERATE TO STRENUOUS EXERCISE (LIKE A BRISK WALK)?: PATIENT DECLINED

## 2023-05-19 SDOH — ECONOMIC STABILITY: TRANSPORTATION INSECURITY
IN THE PAST 12 MONTHS, HAS LACK OF TRANSPORTATION KEPT YOU FROM MEETINGS, WORK, OR FROM GETTING THINGS NEEDED FOR DAILY LIVING?: NO

## 2023-05-19 SDOH — ECONOMIC STABILITY: FOOD INSECURITY: WITHIN THE PAST 12 MONTHS, YOU WORRIED THAT YOUR FOOD WOULD RUN OUT BEFORE YOU GOT MONEY TO BUY MORE.: NEVER TRUE

## 2023-05-19 SDOH — ECONOMIC STABILITY: HOUSING INSECURITY
IN THE LAST 12 MONTHS, WAS THERE A TIME WHEN YOU DID NOT HAVE A STEADY PLACE TO SLEEP OR SLEPT IN A SHELTER (INCLUDING NOW)?: NO

## 2023-05-19 SDOH — HEALTH STABILITY: PHYSICAL HEALTH: ON AVERAGE, HOW MANY MINUTES DO YOU ENGAGE IN EXERCISE AT THIS LEVEL?: 10 MIN

## 2023-05-19 SDOH — ECONOMIC STABILITY: FOOD INSECURITY: WITHIN THE PAST 12 MONTHS, THE FOOD YOU BOUGHT JUST DIDN'T LAST AND YOU DIDN'T HAVE MONEY TO GET MORE.: NEVER TRUE

## 2023-05-19 SDOH — ECONOMIC STABILITY: INCOME INSECURITY: HOW HARD IS IT FOR YOU TO PAY FOR THE VERY BASICS LIKE FOOD, HOUSING, MEDICAL CARE, AND HEATING?: NOT HARD AT ALL

## 2023-05-19 ASSESSMENT — LIFESTYLE VARIABLES
HOW OFTEN DO YOU HAVE SIX OR MORE DRINKS ON ONE OCCASION: 1
HOW MANY STANDARD DRINKS CONTAINING ALCOHOL DO YOU HAVE ON A TYPICAL DAY: 1
HOW OFTEN DO YOU HAVE A DRINK CONTAINING ALCOHOL: 4
HOW OFTEN DO YOU HAVE A DRINK CONTAINING ALCOHOL: 2-3 TIMES A WEEK
HOW MANY STANDARD DRINKS CONTAINING ALCOHOL DO YOU HAVE ON A TYPICAL DAY: 1 OR 2

## 2023-05-19 ASSESSMENT — PATIENT HEALTH QUESTIONNAIRE - PHQ9
1. LITTLE INTEREST OR PLEASURE IN DOING THINGS: 0
SUM OF ALL RESPONSES TO PHQ QUESTIONS 1-9: 0
2. FEELING DOWN, DEPRESSED OR HOPELESS: 0
SUM OF ALL RESPONSES TO PHQ QUESTIONS 1-9: 0
SUM OF ALL RESPONSES TO PHQ9 QUESTIONS 1 & 2: 0
SUM OF ALL RESPONSES TO PHQ QUESTIONS 1-9: 0
SUM OF ALL RESPONSES TO PHQ QUESTIONS 1-9: 0

## 2023-05-22 ENCOUNTER — OFFICE VISIT (OUTPATIENT)
Dept: FAMILY MEDICINE CLINIC | Age: 83
End: 2023-05-22

## 2023-05-22 VITALS
HEART RATE: 69 BPM | OXYGEN SATURATION: 97 % | BODY MASS INDEX: 26.82 KG/M2 | SYSTOLIC BLOOD PRESSURE: 112 MMHG | DIASTOLIC BLOOD PRESSURE: 64 MMHG | HEIGHT: 65 IN | WEIGHT: 161 LBS

## 2023-05-22 DIAGNOSIS — E11.65 UNCONTROLLED TYPE 2 DIABETES MELLITUS WITH HYPERGLYCEMIA (HCC): ICD-10-CM

## 2023-05-22 DIAGNOSIS — E78.5 HYPERLIPIDEMIA LDL GOAL <100: ICD-10-CM

## 2023-05-22 DIAGNOSIS — I10 ESSENTIAL HYPERTENSION: ICD-10-CM

## 2023-05-22 DIAGNOSIS — E11.9 TYPE 2 DIABETES MELLITUS WITHOUT COMPLICATION, WITHOUT LONG-TERM CURRENT USE OF INSULIN (HCC): ICD-10-CM

## 2023-05-22 DIAGNOSIS — Z00.00 MEDICARE ANNUAL WELLNESS VISIT, SUBSEQUENT: Primary | ICD-10-CM

## 2023-05-22 LAB — HBA1C MFR BLD: 8.1 %

## 2023-05-22 NOTE — PATIENT INSTRUCTIONS
assessments and screenings as appropriate. After reviewing your medical record and screening and assessments performed today your provider may have ordered immunizations, labs, imaging, and/or referrals for you. A list of these orders (if applicable) as well as your Preventive Care list are included within your After Visit Summary for your review. Other Preventive Recommendations:    A preventive eye exam performed by an eye specialist is recommended every 1-2 years to screen for glaucoma; cataracts, macular degeneration, and other eye disorders. A preventive dental visit is recommended every 6 months. Try to get at least 150 minutes of exercise per week or 10,000 steps per day on a pedometer . Order or download the FREE \"Exercise & Physical Activity: Your Everyday Guide\" from [employer]. Call 6-308.735.2194 or search [employer] online. You need 0569-2622 mg of calcium and 0725-8087 IU of vitamin D per day. It is possible to meet your calcium requirement with diet alone, but a vitamin D supplement is usually necessary to meet this goal.  When exposed to the sun, use a sunscreen that protects against both UVA and UVB radiation with an SPF of 30 or greater. Reapply every 2 to 3 hours or after sweating, drying off with a towel, or swimming. Always wear a seat belt when traveling in a car. Always wear a helmet when riding a bicycle or motorcycle.

## 2023-05-22 NOTE — PROGRESS NOTES
Medicare Annual Wellness Visit     Assessment and Plan:      Diagnosis Orders   1. Medicare annual wellness visit, subsequent        2. Uncontrolled type 2 diabetes mellitus with hyperglycemia (HCC)  SITagliptin (JANUVIA) 50 MG tablet      3. Essential hypertension        4. Hyperlipidemia LDL goal <100        5. Type 2 diabetes mellitus without complication, without long-term current use of insulin (HCC)  SITagliptin (JANUVIA) 50 MG tablet    POCT glycosylated hemoglobin (Hb A1C)      Stable. Plan as above and below. Good control. Current treatment plan is effective, continue same. FYI: While Medicare provides you with a FREE ANNUAL PREVENTIVE PHYSICAL, this visit does NOT include management of chronic medical problems or physical examination. Dr. Sugar Francois usually does a combination visit if you have other medical problems so you don't have to come back for another visit. However, this means that there will be a co-pay. INSTRUCTIONS  NEXT APPOINTMENT: Please schedule check-up in 3 months FASTING with Dr. Sugar Francois or her NP, Bridgette Kemp. Please get flu vaccine when available in fall. Can get either at this office or at stores such as [employer]. Please get annual dilated eye exam to screen for diabetic retinopathy which can lead to vision loss. Ask for report to be faxed to [phone]. Can start Januvia if not cost prohibitive. Would like A1c under 8 if possible. Check some sugars 2 hours after meal.     Subjective:   Name: Mary Girard  YOB: 1940  Age: 79 y.o. Sex: female  MRN: [id number]     Date of Service:  5/22/2023    Chief Complaint:   Mary Girard is a 79 y.o. female who presents for Medicare Annual Wellness Visit and check-up for:  1. Medicare annual wellness visit, subsequent    2. Uncontrolled type 2 diabetes mellitus with hyperglycemia ([address]    3. Essential hypertension    4. Hyperlipidemia LDL goal <100    5.  Type 2 diabetes mellitus without

## 2023-06-07 ENCOUNTER — PATIENT MESSAGE (OUTPATIENT)
Dept: FAMILY MEDICINE CLINIC | Age: 83
End: 2023-06-07

## 2023-06-07 NOTE — TELEPHONE ENCOUNTER
Can we get her in to be seen soon. Can discuss various meds to try and whether she needs to consult with GI beyond getting the colonoscopy.

## 2023-06-07 NOTE — TELEPHONE ENCOUNTER
From: Armond Stephens  To: Dr. Jessica Lynch  Sent: 6/7/2023 10:21 AM EDT  Subject: Extremely loose bowels    As I mentioned in an earlier message the problem of uncontrollable bowel movements with no warning are getting worse. This morning I had the quintin of not only cleaning up myself but mopping the bathroom floor and around the toilet at 7 AM but now get the thrill of changing my sheets. (I was wearing a nightgown and not pjs so nothing to stop the flow) I have been adding benefiber to my coffee every morning since the last visit to Dr. Dagmar Camacho. Do I add an anti-diarrheal to my daily pill intake? 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

## 2023-06-09 ENCOUNTER — OFFICE VISIT (OUTPATIENT)
Dept: FAMILY MEDICINE CLINIC | Age: 83
End: 2023-06-09

## 2023-06-09 VITALS
DIASTOLIC BLOOD PRESSURE: 70 MMHG | WEIGHT: 158.6 LBS | HEART RATE: 65 BPM | TEMPERATURE: 98.4 F | HEIGHT: 66 IN | SYSTOLIC BLOOD PRESSURE: 114 MMHG | OXYGEN SATURATION: 98 % | RESPIRATION RATE: 16 BRPM | BODY MASS INDEX: 25.49 KG/M2

## 2023-06-09 DIAGNOSIS — K59.1 FUNCTIONAL DIARRHEA: Primary | ICD-10-CM

## 2023-06-09 RX ORDER — NORTRIPTYLINE HYDROCHLORIDE 10 MG/1
10-50 CAPSULE ORAL NIGHTLY
Qty: 90 CAPSULE | Refills: 3 | Status: SHIPPED | OUTPATIENT
Start: 2023-06-09

## 2023-06-09 NOTE — PATIENT INSTRUCTIONS
INSTRUCTIONS  Start pamelor for bowels. May be sedating. Start with one at bedtime. In a week, can increase to 2 at bedtime if not making too sleepy or too constipated. Can continue to increase by one a week as tolerated.

## 2023-06-09 NOTE — PROGRESS NOTES
(L) 08/09/2022    HGB 11.1 (L) 08/09/2022     08/09/2022     Lab Results   Component Value Date    ALT 15 10/17/2022    AST 16 10/17/2022    ALKPHOS 122 10/17/2022    BILITOT 0.9 10/17/2022     TSH (uIU/mL)   Date Value   08/03/2020 1.12     Lab Results   Component Value Date    LABA1C 8.1 05/22/2023    LABA1C 8.3 02/17/2023    LABA1C 6.9 10/17/2022     Objective:   PHYSICAL EXAM   /70 (Site: Left Upper Arm, Position: Sitting, Cuff Size: Medium Adult)   Pulse 65   Temp 98.4 °F (36.9 °C) (Oral)   Resp 16   Ht 5' 6\" (1.676 m)   Wt 158 lb 9.6 oz (71.9 kg)   SpO2 98%   BMI 25.60 kg/m²   BP Readings from Last 5 Encounters:   06/09/23 114/70   05/22/23 112/64   02/17/23 130/60   10/17/22 120/62   10/13/22 120/80     Wt Readings from Last 5 Encounters:   06/09/23 158 lb 9.6 oz (71.9 kg)   05/22/23 161 lb (73 kg)   02/17/23 169 lb (76.7 kg)   10/17/22 167 lb (75.8 kg)   10/13/22 170 lb 6.4 oz (77.3 kg)      GENERAL:   well-developed, well-nourished, alert, no distress. LUNGS:    Breathing unlabored  clear to auscultation bilaterally and good air movement  CARDIOVASC:   regular rate and rhythm  SKIN: warm and dry     Assessment and Plan:      Diagnosis Orders   1. Functional diarrhea           INSTRUCTIONS  Start pamelor for bowels. May be sedating. Start with one at bedtime. In a week, can increase to 2 at bedtime if not making too sleepy or too constipated. Can continue to increase by one a week as tolerated.

## 2023-06-19 ENCOUNTER — TELEPHONE (OUTPATIENT)
Dept: FAMILY MEDICINE CLINIC | Age: 83
End: 2023-06-19

## 2023-06-19 DIAGNOSIS — N39.3 STRESS INCONTINENCE IN FEMALE: Primary | ICD-10-CM

## 2023-06-19 RX ORDER — SOLIFENACIN SUCCINATE 10 MG/1
10 TABLET, FILM COATED ORAL DAILY
Qty: 90 TABLET | Refills: 1 | Status: SHIPPED | OUTPATIENT
Start: 2023-06-19 | End: 2023-06-30 | Stop reason: SDUPTHER

## 2023-06-19 NOTE — TELEPHONE ENCOUNTER
Pt calling in, she called to schedule with Dr. Gómez Guzman today, his next available is not until 8/1. Pt would like to know if there is another provider in the same practice she would be able to get a referral for to be seen sooner.     Please Advise  Pt can be reached at [phone]

## 2023-06-19 NOTE — TELEPHONE ENCOUNTER
She can see any of the providers in the practice.   If she prefers to go somewhere else, she can let me know where to send the referral

## 2023-06-19 NOTE — TELEPHONE ENCOUNTER
Most GI doctors are booked up for at least 6-8 weeks. Don't think anyone else in the the practice can get her in or they would have suggested that.

## 2023-06-19 NOTE — TELEPHONE ENCOUNTER
Informed her of this. She can see if Dr. Vishnu Benitez at that office can get her in sooner. Or can check with another group to see about getting in sooner.  Call if needs new referral.

## 2023-06-29 DIAGNOSIS — N39.3 STRESS INCONTINENCE IN FEMALE: ICD-10-CM

## 2023-06-30 RX ORDER — SOLIFENACIN SUCCINATE 10 MG/1
10 TABLET, FILM COATED ORAL DAILY
Qty: 90 TABLET | Refills: 1 | Status: SHIPPED | OUTPATIENT
Start: 2023-06-30

## 2023-07-07 ENCOUNTER — PATIENT MESSAGE (OUTPATIENT)
Dept: FAMILY MEDICINE CLINIC | Age: 83
End: 2023-07-07

## 2023-08-17 ENCOUNTER — HOSPITAL ENCOUNTER (OUTPATIENT)
Dept: MRI IMAGING | Age: 83
Discharge: HOME | End: 2023-08-17
Attending: INTERNAL MEDICINE
Payer: MEDICARE

## 2023-08-17 DIAGNOSIS — R15.9 INCONTINENCE OF FECES, UNSPECIFIED FECAL INCONTINENCE TYPE: ICD-10-CM

## 2023-08-17 PROCEDURE — 72195 MRI PELVIS W/O DYE: CPT

## 2023-08-24 ENCOUNTER — OFFICE VISIT (OUTPATIENT)
Dept: FAMILY MEDICINE CLINIC | Age: 83
End: 2023-08-24

## 2023-08-24 VITALS
WEIGHT: 156 LBS | HEART RATE: 78 BPM | DIASTOLIC BLOOD PRESSURE: 70 MMHG | OXYGEN SATURATION: 98 % | SYSTOLIC BLOOD PRESSURE: 130 MMHG | BODY MASS INDEX: 25.07 KG/M2 | HEIGHT: 66 IN

## 2023-08-24 DIAGNOSIS — D50.9 IRON DEFICIENCY ANEMIA, UNSPECIFIED IRON DEFICIENCY ANEMIA TYPE: ICD-10-CM

## 2023-08-24 DIAGNOSIS — K59.1 FUNCTIONAL DIARRHEA: ICD-10-CM

## 2023-08-24 DIAGNOSIS — R15.9 FULL INCONTINENCE OF FECES: ICD-10-CM

## 2023-08-24 DIAGNOSIS — E78.5 HYPERLIPIDEMIA LDL GOAL <100: Chronic | ICD-10-CM

## 2023-08-24 DIAGNOSIS — I10 ESSENTIAL HYPERTENSION: ICD-10-CM

## 2023-08-24 DIAGNOSIS — E11.40 TYPE 2 DIABETES MELLITUS WITH DIABETIC NEUROPATHY, WITHOUT LONG-TERM CURRENT USE OF INSULIN (HCC): Primary | ICD-10-CM

## 2023-08-24 LAB — HBA1C MFR BLD: 7.3 %

## 2023-08-24 NOTE — PROGRESS NOTES
CHRONIC CONDITION FOLLOW-UP     Assessment and Plan:      Diagnosis Orders   1. Type 2 diabetes mellitus with diabetic neuropathy, without long-term current use of insulin (HCC)  POCT glycosylated hemoglobin (Hb A1C)    Comprehensive Metabolic Panel    Lipid Panel    TSH    HM DIABETES FOOT EXAM      2. Hyperlipidemia LDL goal <100  Comprehensive Metabolic Panel    Lipid Panel      3. Essential hypertension  Comprehensive Metabolic Panel      4. Iron deficiency anemia, unspecified iron deficiency anemia type  CBC with Auto Differential      5. Functional diarrhea        6. Full incontinence of feces        Stable   A1c OK at 7.3    Continue current Tx plan. Any changes marked below. INSTRUCTIONS  NEXT APPOINTMENT: Please schedule check-up in 4 months  with Dr. Shane Montoya or her NP, Susan April. PLEASE TAKE THIS FORM TO CHECK-OUT WINDOW TO SCHEDULE NEXT VISIT. Please get flu vaccine when available in fall. Can get either at this office or at stores such as [employer] and [employer]. Would get new COVID booster in fall. Separate from other vaccines by at least 2 weeks. Please get annual dilated eye exam to screen for diabetic retinopathy which can lead to vision loss. Ask for report to be faxed to [phone]. Subjective:      Chief Complaint   Patient presents with    Diabetes     3 mo f/u dm check, what can she eat since not able to have caffeine     Other     Check right arm,      Baylee Araujo is an 79 y.o. female who presents for follow up    Complaints:   Getting GI evaluation for the incontinence, may be triggered by caffeine  R elbow not quite to full extension, no pain    CHART REVIEW   reports that she has never smoked. She has been exposed to tobacco smoke.  She has never used smokeless tobacco.  Health Maintenance Due   Topic Date Due    Diabetic retinal exam  03/18/2022    Diabetic foot exam  07/12/2023    Flu vaccine (1) 08/01/2023     Current Outpatient Medications   Medication Instructions

## 2023-08-24 NOTE — PATIENT INSTRUCTIONS
INSTRUCTIONS  NEXT APPOINTMENT: Please schedule check-up in 4 months  with Dr. Varhsa Vega or her NP, Stanislaw Kendrick. PLEASE TAKE THIS FORM TO CHECK-OUT WINDOW TO SCHEDULE NEXT VISIT. Please get flu vaccine when available in fall. Can get either at this office or at stores such as [employer] and [address]. Would get new COVID booster in fall. Separate from other vaccines by at least 2 weeks. Please get annual dilated eye exam to screen for diabetic retinopathy which can lead to vision loss. Ask for report to be faxed to [phone].

## 2023-08-25 DIAGNOSIS — D50.9 IRON DEFICIENCY ANEMIA, UNSPECIFIED IRON DEFICIENCY ANEMIA TYPE: ICD-10-CM

## 2023-08-28 RX ORDER — FERROUS SULFATE 325(65) MG
325 TABLET ORAL 2 TIMES DAILY
Qty: 180 TABLET | Refills: 1 | Status: SHIPPED | OUTPATIENT
Start: 2023-08-28

## 2023-08-31 DIAGNOSIS — E11.40 TYPE 2 DIABETES MELLITUS WITH DIABETIC NEUROPATHY, WITHOUT LONG-TERM CURRENT USE OF INSULIN (HCC): ICD-10-CM

## 2023-08-31 DIAGNOSIS — D50.9 IRON DEFICIENCY ANEMIA, UNSPECIFIED IRON DEFICIENCY ANEMIA TYPE: ICD-10-CM

## 2023-08-31 DIAGNOSIS — E78.5 HYPERLIPIDEMIA LDL GOAL <100: Chronic | ICD-10-CM

## 2023-08-31 DIAGNOSIS — I10 ESSENTIAL HYPERTENSION: ICD-10-CM

## 2023-08-31 LAB
ALBUMIN SERPL-MCNC: 4.4 G/DL (ref 3.4–5)
ALBUMIN/GLOB SERPL: 2.8 {RATIO} (ref 1.1–2.2)
ALP SERPL-CCNC: 104 U/L (ref 40–129)
ALT SERPL-CCNC: 15 U/L (ref 10–40)
ANION GAP SERPL CALCULATED.3IONS-SCNC: 9 MMOL/L (ref 3–16)
AST SERPL-CCNC: 15 U/L (ref 15–37)
BASOPHILS # BLD: 0 K/UL (ref 0–0.2)
BASOPHILS NFR BLD: 0.7 %
BILIRUB SERPL-MCNC: 1.1 MG/DL (ref 0–1)
BUN SERPL-MCNC: 14 MG/DL (ref 7–20)
CALCIUM SERPL-MCNC: 9.7 MG/DL (ref 8.3–10.6)
CHLORIDE SERPL-SCNC: 106 MMOL/L (ref 99–110)
CHOLEST SERPL-MCNC: 142 MG/DL (ref 0–199)
CO2 SERPL-SCNC: 28 MMOL/L (ref 21–32)
CREAT SERPL-MCNC: 0.6 MG/DL (ref 0.6–1.2)
DEPRECATED RDW RBC AUTO: 14.2 % (ref 12.4–15.4)
EOSINOPHIL # BLD: 0.1 K/UL (ref 0–0.6)
EOSINOPHIL NFR BLD: 2.4 %
GFR SERPLBLD CREATININE-BSD FMLA CKD-EPI: >60 ML/MIN/{1.73_M2}
GLUCOSE SERPL-MCNC: 161 MG/DL (ref 70–99)
HCT VFR BLD AUTO: 39.3 % (ref 36–48)
HDLC SERPL-MCNC: 68 MG/DL (ref 40–60)
HGB BLD-MCNC: 13.3 G/DL (ref 12–16)
LDLC SERPL CALC-MCNC: 61 MG/DL
LYMPHOCYTES # BLD: 1.1 K/UL (ref 1–5.1)
LYMPHOCYTES NFR BLD: 21.1 %
MCH RBC QN AUTO: 31.3 PG (ref 26–34)
MCHC RBC AUTO-ENTMCNC: 33.7 G/DL (ref 31–36)
MCV RBC AUTO: 92.9 FL (ref 80–100)
MONOCYTES # BLD: 0.4 K/UL (ref 0–1.3)
MONOCYTES NFR BLD: 7.2 %
NEUTROPHILS # BLD: 3.5 K/UL (ref 1.7–7.7)
NEUTROPHILS NFR BLD: 68.6 %
PLATELET # BLD AUTO: 243 K/UL (ref 135–450)
PMV BLD AUTO: 8.3 FL (ref 5–10.5)
POTASSIUM SERPL-SCNC: 3.8 MMOL/L (ref 3.5–5.1)
PROT SERPL-MCNC: 6 G/DL (ref 6.4–8.2)
RBC # BLD AUTO: 4.24 M/UL (ref 4–5.2)
SODIUM SERPL-SCNC: 143 MMOL/L (ref 136–145)
TRIGL SERPL-MCNC: 66 MG/DL (ref 0–150)
TSH SERPL DL<=0.005 MIU/L-ACNC: 1.73 UIU/ML (ref 0.27–4.2)
VLDLC SERPL CALC-MCNC: 13 MG/DL
WBC # BLD AUTO: 5.1 K/UL (ref 4–11)

## 2023-09-14 ENCOUNTER — PATIENT MESSAGE (OUTPATIENT)
Dept: FAMILY MEDICINE CLINIC | Age: 83
End: 2023-09-14

## 2023-09-18 NOTE — TELEPHONE ENCOUNTER
From: Sophie Barkley  To: Dr. Luke Solis  Sent: 9/14/2023 4:37 PM EDT  Subject: Gastric     Louretta Fernando will see me in his [place] office early October!!!!

## 2023-10-06 DIAGNOSIS — E11.9 TYPE 2 DIABETES MELLITUS WITHOUT COMPLICATION, WITHOUT LONG-TERM CURRENT USE OF INSULIN (HCC): ICD-10-CM

## 2023-10-06 RX ORDER — METFORMIN HYDROCHLORIDE 500 MG/1
1000 TABLET, EXTENDED RELEASE ORAL 2 TIMES DAILY
Qty: 360 TABLET | Refills: 1 | Status: SHIPPED | OUTPATIENT
Start: 2023-10-06

## 2023-10-11 ENCOUNTER — PATIENT MESSAGE (OUTPATIENT)
Dept: FAMILY MEDICINE CLINIC | Age: 83
End: 2023-10-11

## 2023-10-11 DIAGNOSIS — E78.5 HYPERLIPIDEMIA LDL GOAL <100: ICD-10-CM

## 2023-10-11 RX ORDER — ATORVASTATIN CALCIUM 80 MG/1
80 TABLET, FILM COATED ORAL DAILY
Qty: 90 TABLET | Refills: 1 | Status: SHIPPED | OUTPATIENT
Start: 2023-10-11

## 2023-10-11 NOTE — TELEPHONE ENCOUNTER
From: Danitza Kitchen  To: Dr. Basia Galindo  Sent: 10/11/2023 2:49 PM EDT  Subject: Atorvastatin    I got the renewed prescription for Metformin at [facility] today but neither they nor [facility] had one for the Atorvastatin. Did I not request it? Whatever. . I need a new script for it.   Thanks

## 2023-10-15 DIAGNOSIS — I10 ESSENTIAL HYPERTENSION: ICD-10-CM

## 2023-10-16 RX ORDER — LOSARTAN POTASSIUM 25 MG/1
25 TABLET ORAL DAILY
Qty: 90 TABLET | Refills: 1 | Status: SHIPPED | OUTPATIENT
Start: 2023-10-16

## 2023-10-30 ENCOUNTER — OFFICE VISIT (OUTPATIENT)
Dept: PULMONOLOGY | Age: 83
End: 2023-10-30
Payer: MEDICARE

## 2023-10-30 VITALS
TEMPERATURE: 98.2 F | WEIGHT: 158 LBS | BODY MASS INDEX: 25.39 KG/M2 | RESPIRATION RATE: 18 BRPM | OXYGEN SATURATION: 93 % | HEIGHT: 66 IN | SYSTOLIC BLOOD PRESSURE: 144 MMHG | HEART RATE: 89 BPM | DIASTOLIC BLOOD PRESSURE: 78 MMHG

## 2023-10-30 DIAGNOSIS — G47.33 OSA ON CPAP: Primary | ICD-10-CM

## 2023-10-30 PROCEDURE — 1123F ACP DISCUSS/DSCN MKR DOCD: CPT | Performed by: INTERNAL MEDICINE

## 2023-10-30 PROCEDURE — 3078F DIAST BP <80 MM HG: CPT | Performed by: INTERNAL MEDICINE

## 2023-10-30 PROCEDURE — 3077F SYST BP >= 140 MM HG: CPT | Performed by: INTERNAL MEDICINE

## 2023-10-30 PROCEDURE — 99213 OFFICE O/P EST LOW 20 MIN: CPT | Performed by: INTERNAL MEDICINE

## 2023-10-30 ASSESSMENT — SLEEP AND FATIGUE QUESTIONNAIRES
HOW LIKELY ARE YOU TO NOD OFF OR FALL ASLEEP WHILE SITTING QUIETLY AFTER LUNCH WITHOUT ALCOHOL: 0
HOW LIKELY ARE YOU TO NOD OFF OR FALL ASLEEP WHILE SITTING AND READING: 2
ESS TOTAL SCORE: 4
HOW LIKELY ARE YOU TO NOD OFF OR FALL ASLEEP WHEN YOU ARE A PASSENGER IN A CAR FOR AN HOUR WITHOUT A BREAK: 0
HOW LIKELY ARE YOU TO NOD OFF OR FALL ASLEEP WHILE SITTING INACTIVE IN A PUBLIC PLACE: 0
HOW LIKELY ARE YOU TO NOD OFF OR FALL ASLEEP IN A CAR, WHILE STOPPED FOR A FEW MINUTES IN TRAFFIC: 0
HOW LIKELY ARE YOU TO NOD OFF OR FALL ASLEEP WHILE WATCHING TV: 2
HOW LIKELY ARE YOU TO NOD OFF OR FALL ASLEEP WHILE SITTING AND TALKING TO SOMEONE: 0
HOW LIKELY ARE YOU TO NOD OFF OR FALL ASLEEP WHILE LYING DOWN TO REST IN THE AFTERNOON WHEN CIRCUMSTANCES PERMIT: 0

## 2023-10-30 NOTE — PROGRESS NOTES
Chief complaint  This is a 79y.o. year old female  who comes to see me with a chief complaint of   Chief Complaint   Patient presents with    CPAP/BiPAP     1 yr     . HPI  Follow up on Moderate MARILYN with RDI of 26    Machine:  Patient is using a CPAP machine. Average usage is 6 hrs 48 min 00 sec. She is meeting 4 or more hours per night 97% of the time. Average AHI is 0.6     Had issues with very dry eyes and mask leaking. Replaced everything and symptoms improved. Continues to sleep well             10/30/2023     9:58 AM 10/13/2022     9:00 AM 7/7/2021     8:51 AM 4/8/2021     2:14 PM 5/4/2020     1:18 PM 8/22/2018    11:14 AM 4/19/2017    10:20 AM   Sleep Medicine   Sitting and reading 2 2 2 2 2 2 2   Watching TV 2 1 2 1 0 1 1   Sitting, inactive in a public place (e.g. a theatre or a meeting) 0 0 0 0 0 0 0   As a passenger in a car for an hour without a break 0 0 0 1 0 0 0   Lying down to rest in the afternoon when circumstances permit 0 0 0 0 1 1 1   Sitting and talking to someone 0 0 0 0 0 0 0   Sitting quietly after a lunch without alcohol 0 0 0 0 0 0 0   In a car, while stopped for a few minutes in traffic 0 0 0 0 0 0 0   Ligonier Sleepiness Score 4 3 4 4 3 4 4   Neck circumference (Inches)      13.75        PHYSICAL EXAM:  GENERAL:  Well nourished, alert, appears stated age, no distress. Calm  HEENT:  No apparent conjunctival irritation, extra-ocular muscles seem intact. NECK:  No masses visibile, no torticollis   LYMPH:  No obvious masses  LUNGS:  Clear bilaterally   ABDOMEN:  No gross apparent distention  EXTREMITIES:  No visibile swelling, no clubbing   NEURO:  No localizing deficits, CN II-XII intact. No aphasia noted   SKIN:  no visible rashes on exposed skin  PSYCH:  no hallucinations, normal affect      Assessment/Plan  1. MARILYN on CPAP  Benefiting and compliant.   Benefiting from therapy by a low Ligonier score, good energy levels, low fatigue, and control of chronic medical

## 2023-11-13 DIAGNOSIS — E11.9 TYPE 2 DIABETES MELLITUS WITHOUT COMPLICATION, WITHOUT LONG-TERM CURRENT USE OF INSULIN (HCC): ICD-10-CM

## 2023-11-13 DIAGNOSIS — E11.65 UNCONTROLLED TYPE 2 DIABETES MELLITUS WITH HYPERGLYCEMIA (HCC): ICD-10-CM

## 2024-01-01 ENCOUNTER — PATIENT MESSAGE (OUTPATIENT)
Dept: FAMILY MEDICINE CLINIC | Age: 84
End: 2024-01-01

## 2024-01-01 DIAGNOSIS — N39.3 STRESS INCONTINENCE IN FEMALE: ICD-10-CM

## 2024-01-02 RX ORDER — SOLIFENACIN SUCCINATE 10 MG/1
10 TABLET, FILM COATED ORAL DAILY
Qty: 90 TABLET | Refills: 1 | Status: SHIPPED | OUTPATIENT
Start: 2024-01-02

## 2024-01-02 RX ORDER — SOLIFENACIN SUCCINATE 10 MG/1
10 TABLET, FILM COATED ORAL DAILY
Qty: 90 TABLET | Refills: 1 | OUTPATIENT
Start: 2024-01-02

## 2024-01-05 ENCOUNTER — HOSPITAL ENCOUNTER (OUTPATIENT)
Dept: MAMMOGRAPHY | Age: 84
Discharge: HOME | End: 2024-01-05
Payer: MEDICARE

## 2024-01-05 VITALS — BODY MASS INDEX: 25.71 KG/M2 | HEIGHT: 66 IN | WEIGHT: 160 LBS

## 2024-01-05 DIAGNOSIS — Z12.31 VISIT FOR SCREENING MAMMOGRAM: ICD-10-CM

## 2024-01-05 PROCEDURE — 77063 BREAST TOMOSYNTHESIS BI: CPT

## 2024-04-04 DIAGNOSIS — E11.9 TYPE 2 DIABETES MELLITUS WITHOUT COMPLICATION, WITHOUT LONG-TERM CURRENT USE OF INSULIN (HCC): ICD-10-CM

## 2024-04-04 DIAGNOSIS — E78.5 HYPERLIPIDEMIA LDL GOAL <100: ICD-10-CM

## 2024-04-05 RX ORDER — METFORMIN HYDROCHLORIDE 500 MG/1
1000 TABLET, EXTENDED RELEASE ORAL 2 TIMES DAILY
Qty: 360 TABLET | Refills: 1 | Status: SHIPPED | OUTPATIENT
Start: 2024-04-05

## 2024-04-05 RX ORDER — ATORVASTATIN CALCIUM 80 MG/1
80 TABLET, FILM COATED ORAL DAILY
Qty: 90 TABLET | Refills: 1 | Status: SHIPPED | OUTPATIENT
Start: 2024-04-05

## 2024-04-10 DIAGNOSIS — I10 ESSENTIAL HYPERTENSION: ICD-10-CM

## 2024-04-11 RX ORDER — LOSARTAN POTASSIUM 25 MG/1
25 TABLET ORAL DAILY
Qty: 30 TABLET | Refills: 5 | Status: SHIPPED | OUTPATIENT
Start: 2024-04-11 | End: 2024-10-08

## 2024-05-15 NOTE — TELEPHONE ENCOUNTER
From: Natividad Mensah  To: Patrica Daily  Sent: 2023 9:36 AM EST  Subject: Diabetic Supplies    It has been so long since I tested that my scrips for lancets and test strips have  and so have the supplies I have on hand. If I am to do this again I need new stuff. 16

## 2024-05-19 SDOH — HEALTH STABILITY: PHYSICAL HEALTH: ON AVERAGE, HOW MANY DAYS PER WEEK DO YOU ENGAGE IN MODERATE TO STRENUOUS EXERCISE (LIKE A BRISK WALK)?: 0 DAYS

## 2024-05-19 SDOH — ECONOMIC STABILITY: FOOD INSECURITY: WITHIN THE PAST 12 MONTHS, THE FOOD YOU BOUGHT JUST DIDN'T LAST AND YOU DIDN'T HAVE MONEY TO GET MORE.: NEVER TRUE

## 2024-05-19 SDOH — ECONOMIC STABILITY: FOOD INSECURITY: WITHIN THE PAST 12 MONTHS, YOU WORRIED THAT YOUR FOOD WOULD RUN OUT BEFORE YOU GOT MONEY TO BUY MORE.: NEVER TRUE

## 2024-05-19 SDOH — ECONOMIC STABILITY: INCOME INSECURITY: HOW HARD IS IT FOR YOU TO PAY FOR THE VERY BASICS LIKE FOOD, HOUSING, MEDICAL CARE, AND HEATING?: NOT HARD AT ALL

## 2024-05-19 ASSESSMENT — LIFESTYLE VARIABLES
HOW OFTEN DO YOU HAVE SIX OR MORE DRINKS ON ONE OCCASION: 1
HOW OFTEN DURING THE LAST YEAR HAVE YOU NEEDED AN ALCOHOLIC DRINK FIRST THING IN THE MORNING TO GET YOURSELF GOING AFTER A NIGHT OF HEAVY DRINKING: NEVER
HOW OFTEN DURING THE LAST YEAR HAVE YOU HAD A FEELING OF GUILT OR REMORSE AFTER DRINKING: NEVER
HOW OFTEN DURING THE LAST YEAR HAVE YOU FOUND THAT YOU WERE NOT ABLE TO STOP DRINKING ONCE YOU HAD STARTED: NEVER
HOW MANY STANDARD DRINKS CONTAINING ALCOHOL DO YOU HAVE ON A TYPICAL DAY: 1 OR 2
HOW OFTEN DURING THE LAST YEAR HAVE YOU NEEDED AN ALCOHOLIC DRINK FIRST THING IN THE MORNING TO GET YOURSELF GOING AFTER A NIGHT OF HEAVY DRINKING: NEVER
HAS A RELATIVE, FRIEND, DOCTOR, OR ANOTHER HEALTH PROFESSIONAL EXPRESSED CONCERN ABOUT YOUR DRINKING OR SUGGESTED YOU CUT DOWN: NO
HOW OFTEN DURING THE LAST YEAR HAVE YOU FAILED TO DO WHAT WAS NORMALLY EXPECTED FROM YOU BECAUSE OF DRINKING: NEVER
HOW OFTEN DURING THE LAST YEAR HAVE YOU BEEN UNABLE TO REMEMBER WHAT HAPPENED THE NIGHT BEFORE BECAUSE YOU HAD BEEN DRINKING: NEVER
HAS A RELATIVE, FRIEND, DOCTOR, OR ANOTHER HEALTH PROFESSIONAL EXPRESSED CONCERN ABOUT YOUR DRINKING OR SUGGESTED YOU CUT DOWN: NO
HOW OFTEN DURING THE LAST YEAR HAVE YOU FAILED TO DO WHAT WAS NORMALLY EXPECTED FROM YOU BECAUSE OF DRINKING: NEVER
HOW OFTEN DO YOU HAVE A DRINK CONTAINING ALCOHOL: 5
HAVE YOU OR SOMEONE ELSE BEEN INJURED AS A RESULT OF YOUR DRINKING: NO
HOW OFTEN DURING THE LAST YEAR HAVE YOU BEEN UNABLE TO REMEMBER WHAT HAPPENED THE NIGHT BEFORE BECAUSE YOU HAD BEEN DRINKING: NEVER
HOW OFTEN DO YOU HAVE A DRINK CONTAINING ALCOHOL: 4 OR MORE TIMES A WEEK
HOW OFTEN DURING THE LAST YEAR HAVE YOU FOUND THAT YOU WERE NOT ABLE TO STOP DRINKING ONCE YOU HAD STARTED: NEVER
HAVE YOU OR SOMEONE ELSE BEEN INJURED AS A RESULT OF YOUR DRINKING: NO
HOW MANY STANDARD DRINKS CONTAINING ALCOHOL DO YOU HAVE ON A TYPICAL DAY: 1
HOW OFTEN DURING THE LAST YEAR HAVE YOU HAD A FEELING OF GUILT OR REMORSE AFTER DRINKING: NEVER

## 2024-05-19 ASSESSMENT — PATIENT HEALTH QUESTIONNAIRE - PHQ9
SUM OF ALL RESPONSES TO PHQ QUESTIONS 1-9: 0
1. LITTLE INTEREST OR PLEASURE IN DOING THINGS: NOT AT ALL
SUM OF ALL RESPONSES TO PHQ QUESTIONS 1-9: 0
2. FEELING DOWN, DEPRESSED OR HOPELESS: NOT AT ALL
SUM OF ALL RESPONSES TO PHQ9 QUESTIONS 1 & 2: 0
SUM OF ALL RESPONSES TO PHQ QUESTIONS 1-9: 0
SUM OF ALL RESPONSES TO PHQ QUESTIONS 1-9: 0

## 2024-05-22 ENCOUNTER — OFFICE VISIT (OUTPATIENT)
Dept: FAMILY MEDICINE CLINIC | Age: 84
End: 2024-05-22

## 2024-05-22 VITALS
HEIGHT: 66 IN | DIASTOLIC BLOOD PRESSURE: 70 MMHG | RESPIRATION RATE: 14 BRPM | WEIGHT: 161 LBS | SYSTOLIC BLOOD PRESSURE: 132 MMHG | BODY MASS INDEX: 25.88 KG/M2 | OXYGEN SATURATION: 95 % | HEART RATE: 66 BPM

## 2024-05-22 DIAGNOSIS — Z00.00 MEDICARE ANNUAL WELLNESS VISIT, SUBSEQUENT: Primary | ICD-10-CM

## 2024-05-22 DIAGNOSIS — E11.40 TYPE 2 DIABETES MELLITUS WITH DIABETIC NEUROPATHY, WITHOUT LONG-TERM CURRENT USE OF INSULIN (HCC): ICD-10-CM

## 2024-05-22 DIAGNOSIS — I10 ESSENTIAL HYPERTENSION: ICD-10-CM

## 2024-05-22 DIAGNOSIS — E78.5 HYPERLIPIDEMIA LDL GOAL <100: ICD-10-CM

## 2024-05-22 DIAGNOSIS — D50.9 IRON DEFICIENCY ANEMIA, UNSPECIFIED IRON DEFICIENCY ANEMIA TYPE: ICD-10-CM

## 2024-05-22 DIAGNOSIS — R15.9 FULL INCONTINENCE OF FECES: ICD-10-CM

## 2024-05-22 RX ORDER — POLYETHYLENE GLYCOL 3350 17 G/17G
17 POWDER, FOR SOLUTION ORAL DAILY
COMMUNITY

## 2024-05-22 RX ORDER — WHEAT DEXTRIN 3 G/3.8 G
4 POWDER (GRAM) ORAL
COMMUNITY

## 2024-05-22 SDOH — ECONOMIC STABILITY: FOOD INSECURITY: WITHIN THE PAST 12 MONTHS, THE FOOD YOU BOUGHT JUST DIDN'T LAST AND YOU DIDN'T HAVE MONEY TO GET MORE.: NEVER TRUE

## 2024-05-22 SDOH — ECONOMIC STABILITY: FOOD INSECURITY: WITHIN THE PAST 12 MONTHS, YOU WORRIED THAT YOUR FOOD WOULD RUN OUT BEFORE YOU GOT MONEY TO BUY MORE.: NEVER TRUE

## 2024-05-22 SDOH — ECONOMIC STABILITY: INCOME INSECURITY: HOW HARD IS IT FOR YOU TO PAY FOR THE VERY BASICS LIKE FOOD, HOUSING, MEDICAL CARE, AND HEATING?: NOT HARD AT ALL

## 2024-05-22 NOTE — PATIENT INSTRUCTIONS
INSTRUCTIONS  NEXT APPOINTMENT: Please schedule check-up in 6 months    PLEASE TAKE THIS FORM TO CHECK-OUT WINDOW TO SCHEDULE NEXT VISIT.  PLEASE GET FASTING BLOODWORK DRAWN SOON.  Lab is on first floor in [address]. Hours Monday to Friday 6:30 AM to 4 PM AND Saturday 8-12.    Please get annual flu and covid vaccines when available in fall.  Can get at stores such as [employer] and [employer].  Patient Education           A Healthy Heart: Care Instructions  Overview     Coronary artery disease, also called heart disease, occurs when a substance called plaque builds up in the vessels that supply oxygen-rich blood to your heart muscle. This can narrow the blood vessels and reduce blood flow. A heart attack happens when blood flow is completely blocked. A high-fat diet, smoking, and other factors increase the risk of heart disease.  Your doctor has found that you have a chance of having heart disease. A heart-healthy lifestyle can help keep your heart healthy and prevent heart disease. This lifestyle includes eating healthy, being active, staying at a weight that's healthy for you, and not smoking or using tobacco. It also includes taking medicines as directed, managing other health conditions, and trying to get a healthy amount of sleep.  Follow-up care is a key part of your treatment and safety. Be sure to make and go to all appointments, and call your doctor if you are having problems. It's also a good idea to know your test results and keep a list of the medicines you take.  How can you care for yourself at home?  Diet    Use less salt when you cook and eat. This helps lower your blood pressure. Taste food before salting. Add only a little salt when you think you need it. With time, your taste buds will adjust to less salt.     Eat fewer snack items, fast foods, canned soups, and other high-salt, high-fat, processed foods.     Read food labels and try to avoid saturated and trans fats. They increase your risk of heart

## 2024-05-22 NOTE — PROGRESS NOTES
Medicare Annual Wellness Visit     Assessment and Plan:      Diagnosis Orders   1. Medicare annual wellness visit, subsequent        2. Essential hypertension  Comprehensive Metabolic Panel      3. Type 2 diabetes mellitus with diabetic neuropathy, without long-term current use of insulin (HCC)  Hemoglobin A1C    CBC with Auto Differential    TSH with Reflex      4. Hyperlipidemia LDL goal <100  Comprehensive Metabolic Panel    Lipid Panel      5. Iron deficiency anemia, unspecified iron deficiency anemia type  CBC with Auto Differential      6. Full incontinence of feces        Stable    Plan as above and below.    FYI: While Medicare provides you with a FREE ANNUAL PREVENTIVE PHYSICAL, this visit does NOT include management of chronic medical problems or physical examination.  Dr. Vazquez usually does a combination visit if you have other medical problems so you don't have to come back for another visit.  However, this means that there will be a co-pay.    INSTRUCTIONS  NEXT APPOINTMENT: Please schedule check-up in 6 months    PLEASE TAKE THIS FORM TO CHECK-OUT WINDOW TO SCHEDULE NEXT VISIT.  PLEASE GET FASTING BLOODWORK DRAWN SOON.  Lab is on first floor in [address]. Hours Monday to Friday 6:30 AM to 4 PM AND Saturday 8-12.    Please get annual flu and covid vaccines when available in fall.  Can get at stores such as [employer].     Subjective:   Name: Aysha Mueller  YOB: 1940  Age: 84 y.o.  Sex: female  MRN: [id number]     Date of Service:  5/22/2024    Chief Complaint:   Aysha Mueller is a 84 y.o. female who presents for Medicare Annual Wellness Visit and check-up for:  1. Medicare annual wellness visit, subsequent    2. Essential hypertension    3. Type 2 diabetes mellitus with diabetic neuropathy, without long-term current use of insulin (HCC)    4. Hyperlipidemia LDL goal <100    5. Iron deficiency anemia, unspecified iron deficiency anemia type    6. Full incontinence of

## 2024-05-24 DIAGNOSIS — I10 ESSENTIAL HYPERTENSION: ICD-10-CM

## 2024-05-24 DIAGNOSIS — E78.5 HYPERLIPIDEMIA LDL GOAL <100: ICD-10-CM

## 2024-05-24 DIAGNOSIS — D50.9 IRON DEFICIENCY ANEMIA, UNSPECIFIED IRON DEFICIENCY ANEMIA TYPE: ICD-10-CM

## 2024-05-24 DIAGNOSIS — E11.40 TYPE 2 DIABETES MELLITUS WITH DIABETIC NEUROPATHY, WITHOUT LONG-TERM CURRENT USE OF INSULIN (HCC): ICD-10-CM

## 2024-05-24 LAB
ALBUMIN SERPL-MCNC: 4.2 G/DL (ref 3.4–5)
ALBUMIN/GLOB SERPL: 2 {RATIO} (ref 1.1–2.2)
ALP SERPL-CCNC: 103 U/L (ref 40–129)
ALT SERPL-CCNC: 16 U/L (ref 10–40)
ANION GAP SERPL CALCULATED.3IONS-SCNC: 14 MMOL/L (ref 3–16)
AST SERPL-CCNC: 18 U/L (ref 15–37)
BASOPHILS # BLD: 0 K/UL (ref 0–0.2)
BASOPHILS NFR BLD: 0.5 %
BILIRUB SERPL-MCNC: 0.8 MG/DL (ref 0–1)
BUN SERPL-MCNC: 15 MG/DL (ref 7–20)
CALCIUM SERPL-MCNC: 9.9 MG/DL (ref 8.3–10.6)
CHLORIDE SERPL-SCNC: 105 MMOL/L (ref 99–110)
CHOLEST SERPL-MCNC: 144 MG/DL (ref 0–199)
CO2 SERPL-SCNC: 25 MMOL/L (ref 21–32)
CREAT SERPL-MCNC: 0.7 MG/DL (ref 0.6–1.2)
DEPRECATED RDW RBC AUTO: 14.1 % (ref 12.4–15.4)
EOSINOPHIL # BLD: 0.1 K/UL (ref 0–0.6)
EOSINOPHIL NFR BLD: 2.4 %
GFR SERPLBLD CREATININE-BSD FMLA CKD-EPI: 85 ML/MIN/{1.73_M2}
GLUCOSE SERPL-MCNC: 144 MG/DL (ref 70–99)
HCT VFR BLD AUTO: 39.6 % (ref 36–48)
HDLC SERPL-MCNC: 65 MG/DL (ref 40–60)
HGB BLD-MCNC: 13.9 G/DL (ref 12–16)
LDLC SERPL CALC-MCNC: 58 MG/DL
LYMPHOCYTES # BLD: 1.1 K/UL (ref 1–5.1)
LYMPHOCYTES NFR BLD: 19.9 %
MCH RBC QN AUTO: 31.7 PG (ref 26–34)
MCHC RBC AUTO-ENTMCNC: 35.1 G/DL (ref 31–36)
MCV RBC AUTO: 90.5 FL (ref 80–100)
MONOCYTES # BLD: 0.4 K/UL (ref 0–1.3)
MONOCYTES NFR BLD: 7 %
NEUTROPHILS # BLD: 3.8 K/UL (ref 1.7–7.7)
NEUTROPHILS NFR BLD: 70.2 %
PLATELET # BLD AUTO: 228 K/UL (ref 135–450)
PMV BLD AUTO: 8.2 FL (ref 5–10.5)
POTASSIUM SERPL-SCNC: 4.6 MMOL/L (ref 3.5–5.1)
PROT SERPL-MCNC: 6.3 G/DL (ref 6.4–8.2)
RBC # BLD AUTO: 4.38 M/UL (ref 4–5.2)
SODIUM SERPL-SCNC: 144 MMOL/L (ref 136–145)
TRIGL SERPL-MCNC: 104 MG/DL (ref 0–150)
TSH SERPL DL<=0.005 MIU/L-ACNC: 1.3 UIU/ML (ref 0.27–4.2)
VLDLC SERPL CALC-MCNC: 21 MG/DL
WBC # BLD AUTO: 5.4 K/UL (ref 4–11)

## 2024-05-25 LAB
EST. AVERAGE GLUCOSE BLD GHB EST-MCNC: 151.3 MG/DL
HBA1C MFR BLD: 6.9 %

## 2024-07-01 DIAGNOSIS — E11.65 UNCONTROLLED TYPE 2 DIABETES MELLITUS WITH HYPERGLYCEMIA (HCC): ICD-10-CM

## 2024-07-01 DIAGNOSIS — E11.9 TYPE 2 DIABETES MELLITUS WITHOUT COMPLICATION, WITHOUT LONG-TERM CURRENT USE OF INSULIN (HCC): ICD-10-CM

## 2024-07-01 DIAGNOSIS — N39.3 STRESS INCONTINENCE IN FEMALE: ICD-10-CM

## 2024-07-01 RX ORDER — METFORMIN HYDROCHLORIDE 500 MG/1
1000 TABLET, EXTENDED RELEASE ORAL 2 TIMES DAILY
Qty: 360 TABLET | Refills: 1 | Status: SHIPPED | OUTPATIENT
Start: 2024-07-01

## 2024-07-01 RX ORDER — SOLIFENACIN SUCCINATE 10 MG/1
10 TABLET, FILM COATED ORAL DAILY
Qty: 90 TABLET | Refills: 1 | Status: SHIPPED | OUTPATIENT
Start: 2024-07-01

## 2024-08-01 ENCOUNTER — PATIENT MESSAGE (OUTPATIENT)
Dept: FAMILY MEDICINE CLINIC | Age: 84
End: 2024-08-01

## 2024-08-01 NOTE — TELEPHONE ENCOUNTER
From: Aysha Mueller  To: Dr. Tyrel Vazquez  Sent: 8/1/2024 9:46 AM EDT  Subject: My right hand    I am having some increased issues with my right hand and do not knowif it is just more arthritis or something else. When using a computer mouse I have trouble controlling the movements. 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 When sewing, etc. I have less flexibility.  Any suggestions?

## 2024-08-09 ENCOUNTER — OFFICE VISIT (OUTPATIENT)
Dept: FAMILY MEDICINE CLINIC | Age: 84
End: 2024-08-09

## 2024-08-09 VITALS
SYSTOLIC BLOOD PRESSURE: 134 MMHG | OXYGEN SATURATION: 96 % | BODY MASS INDEX: 26 KG/M2 | HEART RATE: 74 BPM | WEIGHT: 161.8 LBS | HEIGHT: 66 IN | DIASTOLIC BLOOD PRESSURE: 78 MMHG

## 2024-08-09 DIAGNOSIS — G25.0 BENIGN ESSENTIAL TREMOR: Primary | ICD-10-CM

## 2024-08-09 DIAGNOSIS — M15.9 GENERALIZED OSTEOARTHRITIS: ICD-10-CM

## 2024-08-09 DIAGNOSIS — I10 ESSENTIAL HYPERTENSION: ICD-10-CM

## 2024-08-09 DIAGNOSIS — G56.91 NEUROPATHY OF FINGER OF RIGHT HAND: ICD-10-CM

## 2024-08-09 RX ORDER — PROPRANOLOL HYDROCHLORIDE 80 MG/1
80 CAPSULE, EXTENDED RELEASE ORAL DAILY
Qty: 30 CAPSULE | Refills: 1 | Status: SHIPPED | OUTPATIENT
Start: 2024-08-09

## 2024-08-09 NOTE — PROGRESS NOTES
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       Ayhsa Mueller is a 84 y.o. female who presents R hand tremors when trying to use mouse at computer. Tremor x 2 months. Never noticed before. Only happens then.    Also pain once in R palm after lots of computer work.     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    Uses cane if walking long distances. Legs just get tired and weak.    CHART REVIEW   reports that she has never smoked. She has never been exposed to tobacco smoke. She has never used smokeless tobacco.  Health Maintenance Due   Topic Date Due    Flu vaccine (1) 08/01/2024     Current Outpatient Medications   Medication Instructions    atorvastatin (LIPITOR) 80 mg, Oral, DAILY    blood glucose monitor kit and supplies Test 2 times a day & as needed for symptoms of irregular blood glucose.    blood glucose monitor strips Test 2 times a day & as needed for symptoms of irregular blood glucose.    CPAP Machine MISC Does not apply    ferrous sulfate (IRON 325) 325 mg, Oral, 2 TIMES DAILY    Lancets MISC 1 each, Does not apply, 2 TIMES DAILY    losartan (COZAAR) 25 mg, Oral, DAILY    metFORMIN (GLUCOPHAGE-XR) 1,000 mg, Oral, 2 TIMES DAILY    Multiple Vitamins-Minerals (THERAPEUTIC MULTIVITAMIN-MINERALS) tablet 1 tablet, Oral, DAILY    polyethylene glycol (MIRALAX) 17 g, Oral, DAILY    propranolol (INDERAL LA) 80 mg, Oral, DAILY    SITagliptin (JANUVIA) 50 mg, Oral, DAILY    solifenacin (VESICARE) 10 mg, Oral, DAILY    Wheat Dextrin (BENEFIBER) POWD 4 g, Oral, 3 TIMES DAILY WITH MEALS     LAST LABS  LDL Cholesterol   Date Value Ref Range Status   05/24/2024 58 <100 mg/dL Final     LDL

## 2024-08-09 NOTE — PATIENT INSTRUCTIONS
INSTRUCTIONS  NEXT APPOINTMENT: Please schedule check-up in 2-3 weeks (after 8/24)  Stop losartan.  Next day start propranolol.   If really dizzy or lightheaded, check pulse and blood pressure.  Patient Education      ESSENTIAL TREMOR    Overview   What is essential tremor?  Essential tremor, sometimes called benign (non-cancerous) or familial tremor, is an uncontrollable shaking, most often in the hands and forearms. You might notice this tremor when your arms are reaching out in front of your body. It is caused by problems in communication between certain areas of the brain. Tremor affects people in different ways. Some people hardly notice it. Other people may be embarrassed by it.     Causes & Risk Factors   What causes essential tremor?  It is not clear why some people get essential tremor. It seems to run in families. You may be more likely to have essential tremor if a parent or other close family member has tremor. Tremor can start at any age, but it often begins after age 40. It can get worse as you get older.    Are there different kinds of tremor?  Yes. Many things can cause tremor, and not all tremors are essential tremors. For example, Parkinson's disease causes tremor that you might notice when your hands are resting in your lap or at the sides of your body. A stroke can cause tremor that gets worse when you reach for something. Thyroid problems or low blood sugar can cause mild tremor. Tremor can be caused by some medicines, including heart medicines, decongestants, medicines for breathing problems and tricyclic antidepressants. Drinks that contain caffeine also may cause tremor.    Diagnosis & Tests   How can my doctor tell if I have essential tremor?  Tell your doctor if you notice that your head or your hands shake, or if your voice quivers. You may find yourself having trouble eating with a spoon or fork, drinking from a cup, threading a needle or writing. The tremor may get worse with emotional

## 2024-09-05 ENCOUNTER — OFFICE VISIT (OUTPATIENT)
Dept: FAMILY MEDICINE CLINIC | Age: 84
End: 2024-09-05

## 2024-09-05 VITALS
DIASTOLIC BLOOD PRESSURE: 70 MMHG | WEIGHT: 162 LBS | BODY MASS INDEX: 26.15 KG/M2 | OXYGEN SATURATION: 97 % | RESPIRATION RATE: 16 BRPM | HEART RATE: 58 BPM | SYSTOLIC BLOOD PRESSURE: 144 MMHG

## 2024-09-05 DIAGNOSIS — E11.9 TYPE 2 DIABETES MELLITUS WITHOUT COMPLICATION, WITHOUT LONG-TERM CURRENT USE OF INSULIN (HCC): ICD-10-CM

## 2024-09-05 DIAGNOSIS — E78.5 HYPERLIPIDEMIA LDL GOAL <100: ICD-10-CM

## 2024-09-05 DIAGNOSIS — Z23 NEEDS FLU SHOT: ICD-10-CM

## 2024-09-05 DIAGNOSIS — I10 ESSENTIAL HYPERTENSION: ICD-10-CM

## 2024-09-05 DIAGNOSIS — I10 UNCONTROLLED HYPERTENSION: Primary | ICD-10-CM

## 2024-09-05 PROBLEM — E11.22 TYPE 2 DIABETES MELLITUS WITH CHRONIC KIDNEY DISEASE (HCC): Status: ACTIVE | Noted: 2024-09-05

## 2024-09-05 LAB — HBA1C MFR BLD: 6.9 %

## 2024-09-05 RX ORDER — LOSARTAN POTASSIUM 25 MG/1
25 TABLET ORAL DAILY
Qty: 30 TABLET | Refills: 5 | Status: SHIPPED | OUTPATIENT
Start: 2024-09-05 | End: 2025-03-04

## 2024-09-05 RX ORDER — LOSARTAN POTASSIUM 25 MG/1
25 TABLET ORAL DAILY
Qty: 30 TABLET | Refills: 5 | Status: SHIPPED | OUTPATIENT
Start: 2024-09-05 | End: 2024-09-05 | Stop reason: SDUPTHER

## 2024-09-05 NOTE — PATIENT INSTRUCTIONS
INSTRUCTIONS  NEXT APPOINTMENT: Please schedule check-up in 4 months    PLEASE TAKE THIS FORM TO CHECK-OUT WINDOW TO SCHEDULE NEXT VISIT.   A1c good at 6.9  Please get annual covid vaccine when available in fall.  Can get at stores such as [employer] and [employer].  Get lidocaine patches at [facility] today.  Restart losartan 25 mg once a day.  Please drop in to see medical assistant to repeat blood pressure in 2-3 weeks.

## 2024-09-05 NOTE — PROGRESS NOTES
CHRONIC CONDITION FOLLOW-UP     Assessment and Plan:      Diagnosis Orders   1. Uncontrolled hypertension        2. Essential hypertension  losartan (COZAAR) 25 MG tablet    DISCONTINUED: losartan (COZAAR) 25 MG tablet      3. Type 2 diabetes mellitus without complication, without long-term current use of insulin (HCC)        4. Hyperlipidemia LDL goal <100        5. Needs flu shot  Influenza, FLUAD Trivalent, (age 65 y+), IM, Preservative Free, 0.5mL        Plan as above and below.     Continue current Tx plan. Any changes marked below.    INSTRUCTIONS  NEXT APPOINTMENT: Please schedule check-up in 4 months    PLEASE TAKE THIS FORM TO CHECK-OUT WINDOW TO SCHEDULE NEXT VISIT.   A1c good at 6.9  Please get annual covid vaccine when available in fall.  Can get at stores such as [employer] and [employer].  Get lidocaine patches at [employer] today.  Restart losartan 25 mg once a day.  Please drop in to see medical assistant to repeat blood pressure in 2-3 weeks.    Subjective:      Chief Complaint   Patient presents with    Tremors     Follow up    Diabetes     Follow up     Aysha Mueller is an 84 y.o. female who presents for follow up    Complaints:   Back better after shot but still some discomfort. Going on [place] for a 10 days leaving tomorrow.  Tremor better on propranolol.    CHART REVIEW  Health Maintenance Due   Topic Date Due    Flu vaccine (1) 08/01/2024     Social History     Tobacco Use    Smoking status: Never     Passive exposure: Never    Smokeless tobacco: Never    Tobacco comments:     Advised not to start.   Vaping Use    Vaping status: Never Used   Substance Use Topics    Alcohol use: Yes     Comment: couple nights a week--wine    Drug use: Never     MEDS  Current Outpatient Medications   Medication Instructions    atorvastatin (LIPITOR) 80 mg, Oral, DAILY    blood glucose monitor kit and supplies Test 2 times a day & as needed for symptoms of irregular blood glucose.    blood glucose

## 2024-10-22 DIAGNOSIS — E78.5 HYPERLIPIDEMIA LDL GOAL <100: ICD-10-CM

## 2024-10-23 RX ORDER — ATORVASTATIN CALCIUM 80 MG/1
80 TABLET, FILM COATED ORAL DAILY
Qty: 90 TABLET | Refills: 1 | Status: SHIPPED | OUTPATIENT
Start: 2024-10-23

## 2024-10-28 ENCOUNTER — OFFICE VISIT (OUTPATIENT)
Dept: PULMONOLOGY | Age: 84
End: 2024-10-28
Payer: MEDICARE

## 2024-10-28 VITALS
RESPIRATION RATE: 18 BRPM | TEMPERATURE: 97 F | SYSTOLIC BLOOD PRESSURE: 135 MMHG | WEIGHT: 164.6 LBS | HEART RATE: 82 BPM | DIASTOLIC BLOOD PRESSURE: 74 MMHG | BODY MASS INDEX: 26.45 KG/M2 | HEIGHT: 66 IN | OXYGEN SATURATION: 95 %

## 2024-10-28 DIAGNOSIS — G47.33 OSA ON CPAP: Primary | ICD-10-CM

## 2024-10-28 PROCEDURE — 99213 OFFICE O/P EST LOW 20 MIN: CPT | Performed by: INTERNAL MEDICINE

## 2024-10-28 PROCEDURE — 3078F DIAST BP <80 MM HG: CPT | Performed by: INTERNAL MEDICINE

## 2024-10-28 PROCEDURE — 1159F MED LIST DOCD IN RCRD: CPT | Performed by: INTERNAL MEDICINE

## 2024-10-28 PROCEDURE — 3075F SYST BP GE 130 - 139MM HG: CPT | Performed by: INTERNAL MEDICINE

## 2024-10-28 PROCEDURE — 1123F ACP DISCUSS/DSCN MKR DOCD: CPT | Performed by: INTERNAL MEDICINE

## 2024-10-28 NOTE — PROGRESS NOTES
fatigue, and control of chronic medical problems      Recommend adjusting humidifier and/or getting a humidifier for the room     Follow up in 12 months                    .

## 2024-12-07 DIAGNOSIS — I10 ESSENTIAL HYPERTENSION: ICD-10-CM

## 2024-12-09 RX ORDER — LOSARTAN POTASSIUM 25 MG/1
25 TABLET ORAL DAILY
Qty: 30 TABLET | Refills: 5 | Status: SHIPPED | OUTPATIENT
Start: 2024-12-09 | End: 2025-06-07

## 2024-12-12 ENCOUNTER — OFFICE VISIT (OUTPATIENT)
Dept: FAMILY MEDICINE CLINIC | Age: 84
End: 2024-12-12

## 2024-12-12 VITALS
OXYGEN SATURATION: 96 % | RESPIRATION RATE: 16 BRPM | BODY MASS INDEX: 26.68 KG/M2 | HEART RATE: 61 BPM | HEIGHT: 66 IN | WEIGHT: 166 LBS | SYSTOLIC BLOOD PRESSURE: 128 MMHG | DIASTOLIC BLOOD PRESSURE: 78 MMHG

## 2024-12-12 DIAGNOSIS — M77.01 MEDIAL EPICONDYLITIS OF ELBOW, RIGHT: ICD-10-CM

## 2024-12-12 DIAGNOSIS — M77.11 LATERAL EPICONDYLITIS OF RIGHT ELBOW: ICD-10-CM

## 2024-12-12 DIAGNOSIS — M18.12 DEGENERATIVE ARTHRITIS OF THUMB, LEFT: Primary | ICD-10-CM

## 2024-12-12 NOTE — PATIENT INSTRUCTIONS
INSTRUCTIONS  Use heat 20 minutes on painful joint/muscle.  Then do stretches.  May ice any sore spots or for swelling afterwards.    Patient Education            Lateral Epicondylitis (Tennis Elbow)     Rehabilitation Exercises   You may do the stretching exercises right away. You may do the strengthening exercises when stretching is nearly painless.   Stretching exercises   Wrist range of motion: Bend your wrist forward and backward as far as you can. Do 3 sets of 10.   Pronation and supination of the forearm: With your elbow bent 90°, turn your palm upward and hold for 5 seconds. Slowly turn your palm downward and hold for 5 seconds. Make sure you keep your elbow at your side and bent 90° throughout this exercise. Do 3 sets of 10.   Elbow range of motion: Gently bring your palm up toward your shoulder and bend your elbow as far as you can. Then straighten your elbow as far as you can 10 times. Do 3 sets of 10.   Strengthening exercises   Wrist flexion exercise: Hold a can or hammer handle in your hand with your palm facing up. Bend your wrist upward. Slowly lower the weight and return to the starting position. Do 3 sets of 10. Gradually increase the weight of the can or weight you are holding.   Wrist extension exercise: Hold a soup can or hammer handle in your hand with your palm facing down. Slowly bend your wrist upward. Slowly lower the weight down into the starting position. Do 3 sets of 10. Gradually increase the weight of the object you are holding.   Wrist radial deviation strengthening: Put your wrist in the sideways position with your thumb up. Hold a can of soup or a hammer handle and gently bend your wrist up, with the thumb reaching toward the ceiling. Slowly lower to the starting position. Do not move your forearm throughout this exercise. Do 3 sets of 10.   Forearm pronation and supination strengthening: Hold a soup can or hammer handle in your hand and bend your elbow 90°. Slowly rotate your hand

## 2024-12-12 NOTE — PROGRESS NOTES
PROBLEM VISIT NOTE   Assessment and Plan:      Diagnosis Orders   1. Degenerative arthritis of thumb, left  Elastic Bandages & Supports (THUMB SPLINT/LEFT MEDIUM) MISC      2. Medial epicondylitis of elbow, right        3. Lateral epicondylitis of right elbow          INSTRUCTIONS  Use heat 20 minutes on painful joint/muscle.  Then do stretches.  May ice any sore spots or for swelling afterwards.       Subjective:     Chief Complaint   Patient presents with    Elbow Pain     Pt has elbow pain x3 weeks   Would like to discuss imaging      Aysha Mueller is a 84 y.o. female who presents with R elbow pain  Duration 3 weeks  Associated with occasional lateral burn or going up arm.  Worse with movement    CHART REVIEW   reports that she has never smoked. She has never been exposed to tobacco smoke. She has never used smokeless tobacco.  Health Maintenance Due   Topic Date Due    Breast cancer screen  01/05/2025     Current Outpatient Medications   Medication Instructions    atorvastatin (LIPITOR) 80 mg, Oral, DAILY    blood glucose monitor kit and supplies Test 2 times a day & as needed for symptoms of irregular blood glucose.    blood glucose monitor strips Test 2 times a day & as needed for symptoms of irregular blood glucose.    CPAP Machine MISC Does not apply    Elastic Bandages & Supports (THUMB SPLINT/LEFT MEDIUM) MISC Wear as needed    ferrous sulfate (IRON 325) 325 mg, Oral, 2 TIMES DAILY    Lancets MISC 1 each, Does not apply, 2 TIMES DAILY    losartan (COZAAR) 25 mg, Oral, DAILY    metFORMIN (GLUCOPHAGE-XR) 1,000 mg, Oral, 2 TIMES DAILY    Multiple Vitamins-Minerals (THERAPEUTIC MULTIVITAMIN-MINERALS) tablet 1 tablet, Oral, DAILY    polyethylene glycol (MIRALAX) 17 g, Oral, DAILY    SITagliptin (JANUVIA) 50 mg, Oral, DAILY    solifenacin (VESICARE) 10 mg, Oral, DAILY     LAST LABS  LDL Cholesterol   Date Value Ref Range Status   05/24/2024 58 <100 mg/dL Final     LDL Calculated   Date Value Ref Range

## 2024-12-31 ENCOUNTER — PATIENT MESSAGE (OUTPATIENT)
Dept: FAMILY MEDICINE CLINIC | Age: 84
End: 2024-12-31

## 2024-12-31 DIAGNOSIS — E11.9 TYPE 2 DIABETES MELLITUS WITHOUT COMPLICATION, WITHOUT LONG-TERM CURRENT USE OF INSULIN (HCC): ICD-10-CM

## 2024-12-31 DIAGNOSIS — E11.65 UNCONTROLLED TYPE 2 DIABETES MELLITUS WITH HYPERGLYCEMIA (HCC): ICD-10-CM

## 2025-01-01 DIAGNOSIS — E11.9 TYPE 2 DIABETES MELLITUS WITHOUT COMPLICATION, WITHOUT LONG-TERM CURRENT USE OF INSULIN (HCC): ICD-10-CM

## 2025-01-02 RX ORDER — METFORMIN HYDROCHLORIDE 500 MG/1
1000 TABLET, EXTENDED RELEASE ORAL 2 TIMES DAILY
Qty: 360 TABLET | Refills: 1 | Status: SHIPPED | OUTPATIENT
Start: 2025-01-02

## 2025-01-03 ENCOUNTER — PATIENT MESSAGE (OUTPATIENT)
Dept: FAMILY MEDICINE CLINIC | Age: 85
End: 2025-01-03

## 2025-01-03 DIAGNOSIS — K59.1 FUNCTIONAL DIARRHEA: Primary | ICD-10-CM

## 2025-01-03 DIAGNOSIS — R15.9 FULL INCONTINENCE OF FECES: ICD-10-CM

## 2025-01-10 ENCOUNTER — PATIENT MESSAGE (OUTPATIENT)
Dept: FAMILY MEDICINE CLINIC | Age: 85
End: 2025-01-10

## 2025-01-10 DIAGNOSIS — E11.9 TYPE 2 DIABETES MELLITUS WITHOUT COMPLICATION, WITHOUT LONG-TERM CURRENT USE OF INSULIN (HCC): ICD-10-CM

## 2025-01-10 DIAGNOSIS — E11.65 UNCONTROLLED TYPE 2 DIABETES MELLITUS WITH HYPERGLYCEMIA (HCC): ICD-10-CM

## 2025-01-26 ENCOUNTER — PATIENT MESSAGE (OUTPATIENT)
Dept: FAMILY MEDICINE CLINIC | Age: 85
End: 2025-01-26

## 2025-01-26 DIAGNOSIS — R15.9 FULL INCONTINENCE OF FECES: ICD-10-CM

## 2025-01-26 DIAGNOSIS — K59.1 FUNCTIONAL DIARRHEA: Primary | ICD-10-CM

## 2025-02-06 ENCOUNTER — PATIENT MESSAGE (OUTPATIENT)
Dept: FAMILY MEDICINE CLINIC | Age: 85
End: 2025-02-06

## 2025-02-12 ENCOUNTER — HOSPITAL ENCOUNTER (OUTPATIENT)
Dept: GENERAL RADIOLOGY | Age: 85
Discharge: HOME | End: 2025-02-12
Attending: INTERNAL MEDICINE
Payer: MEDICARE

## 2025-02-12 ENCOUNTER — HOSPITAL ENCOUNTER (OUTPATIENT)
Age: 85
Discharge: HOME | End: 2025-02-12
Payer: MEDICARE

## 2025-02-12 PROCEDURE — 74018 RADEX ABDOMEN 1 VIEW: CPT

## 2025-02-20 ENCOUNTER — PATIENT MESSAGE (OUTPATIENT)
Dept: FAMILY MEDICINE CLINIC | Age: 85
End: 2025-02-20

## 2025-02-20 DIAGNOSIS — E78.5 HYPERLIPIDEMIA: ICD-10-CM

## 2025-02-21 RX ORDER — SOLIFENACIN SUCCINATE 10 MG/1
10 TABLET, FILM COATED ORAL DAILY
Qty: 90 TABLET | Refills: 3 | Status: SHIPPED | OUTPATIENT
Start: 2025-02-21 | End: 2026-02-21

## 2025-02-27 ENCOUNTER — HOSPITAL ENCOUNTER (OUTPATIENT)
Dept: MAMMOGRAPHY | Age: 85
Discharge: HOME | End: 2025-02-27
Payer: MEDICARE

## 2025-02-27 VITALS — HEIGHT: 66 IN | WEIGHT: 166 LBS | BODY MASS INDEX: 26.68 KG/M2

## 2025-02-27 DIAGNOSIS — Z12.31 VISIT FOR SCREENING MAMMOGRAM: ICD-10-CM

## 2025-02-27 PROCEDURE — 77063 BREAST TOMOSYNTHESIS BI: CPT

## 2025-03-07 SDOH — ECONOMIC STABILITY: TRANSPORTATION INSECURITY
IN THE PAST 12 MONTHS, HAS THE LACK OF TRANSPORTATION KEPT YOU FROM MEDICAL APPOINTMENTS OR FROM GETTING MEDICATIONS?: NO

## 2025-03-07 SDOH — ECONOMIC STABILITY: INCOME INSECURITY: IN THE LAST 12 MONTHS, WAS THERE A TIME WHEN YOU WERE NOT ABLE TO PAY THE MORTGAGE OR RENT ON TIME?: NO

## 2025-03-07 SDOH — ECONOMIC STABILITY: FOOD INSECURITY: WITHIN THE PAST 12 MONTHS, YOU WORRIED THAT YOUR FOOD WOULD RUN OUT BEFORE YOU GOT MONEY TO BUY MORE.: NEVER TRUE

## 2025-03-07 SDOH — ECONOMIC STABILITY: FOOD INSECURITY: WITHIN THE PAST 12 MONTHS, THE FOOD YOU BOUGHT JUST DIDN'T LAST AND YOU DIDN'T HAVE MONEY TO GET MORE.: NEVER TRUE

## 2025-03-07 ASSESSMENT — PATIENT HEALTH QUESTIONNAIRE - PHQ9
SUM OF ALL RESPONSES TO PHQ QUESTIONS 1-9: 0
2. FEELING DOWN, DEPRESSED OR HOPELESS: NOT AT ALL
SUM OF ALL RESPONSES TO PHQ9 QUESTIONS 1 & 2: 0
SUM OF ALL RESPONSES TO PHQ QUESTIONS 1-9: 0
2. FEELING DOWN, DEPRESSED OR HOPELESS: NOT AT ALL
1. LITTLE INTEREST OR PLEASURE IN DOING THINGS: NOT AT ALL
1. LITTLE INTEREST OR PLEASURE IN DOING THINGS: NOT AT ALL

## 2025-03-10 ENCOUNTER — OFFICE VISIT (OUTPATIENT)
Dept: FAMILY MEDICINE CLINIC | Age: 85
End: 2025-03-10

## 2025-03-10 VITALS
WEIGHT: 167.8 LBS | BODY MASS INDEX: 26.97 KG/M2 | HEART RATE: 77 BPM | HEIGHT: 66 IN | DIASTOLIC BLOOD PRESSURE: 68 MMHG | OXYGEN SATURATION: 93 % | SYSTOLIC BLOOD PRESSURE: 126 MMHG

## 2025-03-10 DIAGNOSIS — E78.2 MIXED HYPERLIPIDEMIA: ICD-10-CM

## 2025-03-10 DIAGNOSIS — E11.65 UNCONTROLLED TYPE 2 DIABETES MELLITUS WITH HYPERGLYCEMIA (HCC): ICD-10-CM

## 2025-03-10 DIAGNOSIS — E11.65 UNCONTROLLED TYPE 2 DIABETES MELLITUS WITH HYPERGLYCEMIA (HCC): Primary | ICD-10-CM

## 2025-03-10 DIAGNOSIS — E11.22 TYPE 2 DIABETES MELLITUS WITH DIABETIC CHRONIC KIDNEY DISEASE, UNSPECIFIED CKD STAGE, UNSPECIFIED WHETHER LONG TERM INSULIN USE (HCC): ICD-10-CM

## 2025-03-10 DIAGNOSIS — I10 ESSENTIAL HYPERTENSION: ICD-10-CM

## 2025-03-10 DIAGNOSIS — E11.9 TYPE 2 DIABETES MELLITUS WITHOUT COMPLICATION, WITHOUT LONG-TERM CURRENT USE OF INSULIN (HCC): ICD-10-CM

## 2025-03-10 DIAGNOSIS — R07.9 CHEST PAIN, UNSPECIFIED TYPE: ICD-10-CM

## 2025-03-10 LAB — HBA1C MFR BLD: 8.9 %

## 2025-03-10 NOTE — PATIENT INSTRUCTIONS
INSTRUCTIONS  NEXT APPOINTMENT: Please schedule fasting annual physical (30 minutes) in 4 months.  OK to have water, black coffee and medications (except for diabetes medicines)    PLEASE TAKE THIS FORM TO CHECK-OUT WINDOW TO SCHEDULE NEXT VISIT.   PLEASE GET BLOODWORK DRAWN TODAY ON FIRST FLOOR in 170.  Will see if 8.9 from our machine is correct.  If possible, it would be good idea to get blood work 2-10 working days BEFORE next visit. This way we can discuss results. HOWEVER, if having any new symptoms please wait until seen in case other tests are needed.  EKG same as 2 years ago. Get stress test to be safe.

## 2025-03-10 NOTE — PROGRESS NOTES
CHRONIC CONDITION FOLLOW-UP     Assessment and Plan:      Diagnosis Orders   1. Uncontrolled type 2 diabetes mellitus with hyperglycemia (HCC)  Hemoglobin A1C      2. Type 2 diabetes mellitus with diabetic chronic kidney disease, unspecified CKD stage, unspecified whether long term insulin use (HCC)  POCT glycosylated hemoglobin (Hb A1C)      3. Essential hypertension        4. Mixed hyperlipidemia        5. Chest pain, unspecified type  EKG 12 Lead    Nuclear stress test with myocardial perfusion      6. Type 2 diabetes mellitus without complication, without long-term current use of insulin (HCC)          Plan as above and below.     Continue current Tx plan. Any changes marked below.    INSTRUCTIONS  NEXT APPOINTMENT: Please schedule fasting annual physical (30 minutes) in 4 months.  OK to have water, black coffee and medications (except for diabetes medicines)    PLEASE TAKE THIS FORM TO CHECK-OUT WINDOW TO SCHEDULE NEXT VISIT.   PLEASE GET BLOODWORK DRAWN TODAY ON FIRST FLOOR in 170.  Will see if 8.9 from our machine is correct.  If possible, it would be good idea to get blood work 2-10 working days BEFORE next visit. This way we can discuss results. HOWEVER, if having any new symptoms please wait until seen in case other tests are needed.  EKG same as 2 years ago. Get stress test to be safe.    Subjective:      Chief Complaint   Patient presents with    Follow-up     3 month follow up on Dm    Arthritis     In foot was diagnosed March 5. Patient states she has problems with walking.     Chest Pain     she complains of chest pain when walking fast       Aysha Mueller is an 84 y.o. female who presents for follow up    Complaints:   Chest pain after walking 5-10 minutes to parking has substernal chest pain thru to back. Denies sweat, nausea. Maybe a little shortness of breath. Lasts a few minutes. A few times a month. Past 6 month. Only happens walking out from (not to) [facility] which is slight

## 2025-03-11 LAB
EST. AVERAGE GLUCOSE BLD GHB EST-MCNC: 194.4 MG/DL
HBA1C MFR BLD: 8.4 %

## 2025-03-12 ENCOUNTER — PATIENT MESSAGE (OUTPATIENT)
Dept: FAMILY MEDICINE CLINIC | Age: 85
End: 2025-03-12

## 2025-03-17 ENCOUNTER — RESULTS FOLLOW-UP (OUTPATIENT)
Dept: FAMILY MEDICINE CLINIC | Age: 85
End: 2025-03-17

## 2025-03-18 ENCOUNTER — HOSPITAL ENCOUNTER (OUTPATIENT)
Dept: NUCLEAR MEDICINE | Age: 85
Discharge: HOME | End: 2025-03-18
Payer: MEDICARE

## 2025-03-18 ENCOUNTER — HOSPITAL ENCOUNTER (OUTPATIENT)
Age: 85
Discharge: HOME | End: 2025-03-20
Payer: MEDICARE

## 2025-03-18 DIAGNOSIS — R07.9 CHEST PAIN, UNSPECIFIED TYPE: ICD-10-CM

## 2025-03-18 LAB
NUC STRESS EJECTION FRACTION: 74 %
NUC STRESS LV EDV: 98 ML (ref 56–104)
NUC STRESS LV ESV: 25 ML (ref 19–49)
NUC STRESS LV MASS: 131 G
NUC STRESS LV STROKE VOLUME: 73 ML
STRESS BASELINE DIAS BP: 86 MMHG
STRESS BASELINE HR: 57 BPM
STRESS BASELINE SYS BP: 191 MMHG
STRESS ESTIMATED WORKLOAD: 1 METS
STRESS PEAK DIAS BP: 86 MMHG
STRESS PEAK SYS BP: 191 MMHG
STRESS PERCENT HR ACHIEVED: 64 %
STRESS POST PEAK HR: 87 BPM
STRESS RATE PRESSURE PRODUCT: NORMAL BPM*MMHG
STRESS TARGET HR: 135 BPM

## 2025-03-18 PROCEDURE — 78452 HT MUSCLE IMAGE SPECT MULT: CPT

## 2025-03-18 PROCEDURE — 3430000000 HC RX DIAGNOSTIC RADIOPHARMACEUTICAL: Performed by: FAMILY MEDICINE

## 2025-03-18 PROCEDURE — 93018 CV STRESS TEST I&R ONLY: CPT | Performed by: INTERNAL MEDICINE

## 2025-03-18 PROCEDURE — 93017 CV STRESS TEST TRACING ONLY: CPT

## 2025-03-18 PROCEDURE — A9502 TC99M TETROFOSMIN: HCPCS | Performed by: FAMILY MEDICINE

## 2025-03-18 PROCEDURE — 78452 HT MUSCLE IMAGE SPECT MULT: CPT | Performed by: INTERNAL MEDICINE

## 2025-03-18 PROCEDURE — 93016 CV STRESS TEST SUPVJ ONLY: CPT | Performed by: INTERNAL MEDICINE

## 2025-03-18 PROCEDURE — 6360000002 HC RX W HCPCS: Performed by: FAMILY MEDICINE

## 2025-03-18 RX ORDER — REGADENOSON 0.08 MG/ML
0.4 INJECTION, SOLUTION INTRAVENOUS
Status: COMPLETED | OUTPATIENT
Start: 2025-03-18 | End: 2025-03-18

## 2025-03-18 RX ADMIN — REGADENOSON 0.4 MG: 0.08 INJECTION, SOLUTION INTRAVENOUS at 10:15

## 2025-03-18 RX ADMIN — TETROFOSMIN 33.1 MILLICURIE: 1.38 INJECTION, POWDER, LYOPHILIZED, FOR SOLUTION INTRAVENOUS at 10:15

## 2025-03-18 RX ADMIN — TETROFOSMIN 10.67 MILLICURIE: 1.38 INJECTION, POWDER, LYOPHILIZED, FOR SOLUTION INTRAVENOUS at 08:59

## 2025-03-19 ENCOUNTER — RESULTS FOLLOW-UP (OUTPATIENT)
Dept: NUCLEAR MEDICINE | Age: 85
End: 2025-03-19

## 2025-04-16 DIAGNOSIS — E78.5 HYPERLIPIDEMIA LDL GOAL <100: ICD-10-CM

## 2025-04-16 RX ORDER — ATORVASTATIN CALCIUM 80 MG/1
80 TABLET, FILM COATED ORAL DAILY
Qty: 90 TABLET | Refills: 1 | Status: SHIPPED | OUTPATIENT
Start: 2025-04-16

## 2025-04-17 DIAGNOSIS — E11.65 UNCONTROLLED TYPE 2 DIABETES MELLITUS WITH HYPERGLYCEMIA (HCC): ICD-10-CM

## 2025-04-17 DIAGNOSIS — E11.9 TYPE 2 DIABETES MELLITUS WITHOUT COMPLICATION, WITHOUT LONG-TERM CURRENT USE OF INSULIN: ICD-10-CM

## 2025-04-17 RX ORDER — SITAGLIPTIN 50 MG/1
50 TABLET, FILM COATED ORAL DAILY
Qty: 90 TABLET | Refills: 1 | Status: SHIPPED | OUTPATIENT
Start: 2025-04-17

## 2025-05-30 DIAGNOSIS — I10 ESSENTIAL HYPERTENSION: ICD-10-CM

## 2025-05-30 RX ORDER — LOSARTAN POTASSIUM 25 MG/1
25 TABLET ORAL DAILY
Qty: 90 TABLET | Refills: 0 | Status: SHIPPED | OUTPATIENT
Start: 2025-05-30 | End: 2025-11-26

## 2025-07-03 DIAGNOSIS — E11.9 TYPE 2 DIABETES MELLITUS WITHOUT COMPLICATION, WITHOUT LONG-TERM CURRENT USE OF INSULIN (HCC): ICD-10-CM

## 2025-07-03 RX ORDER — METFORMIN HYDROCHLORIDE 500 MG/1
1000 TABLET, EXTENDED RELEASE ORAL 2 TIMES DAILY
Qty: 360 TABLET | Refills: 1 | Status: SHIPPED | OUTPATIENT
Start: 2025-07-03

## 2025-07-07 SDOH — HEALTH STABILITY: PHYSICAL HEALTH: ON AVERAGE, HOW MANY MINUTES DO YOU ENGAGE IN EXERCISE AT THIS LEVEL?: 10 MIN

## 2025-07-07 SDOH — HEALTH STABILITY: PHYSICAL HEALTH: ON AVERAGE, HOW MANY DAYS PER WEEK DO YOU ENGAGE IN MODERATE TO STRENUOUS EXERCISE (LIKE A BRISK WALK)?: 0 DAYS

## 2025-07-07 ASSESSMENT — LIFESTYLE VARIABLES
HOW OFTEN DURING THE LAST YEAR HAVE YOU HAD A FEELING OF GUILT OR REMORSE AFTER DRINKING: NEVER
HOW OFTEN DO YOU HAVE A DRINK CONTAINING ALCOHOL: 4 OR MORE TIMES A WEEK
HOW OFTEN DURING THE LAST YEAR HAVE YOU NEEDED AN ALCOHOLIC DRINK FIRST THING IN THE MORNING TO GET YOURSELF GOING AFTER A NIGHT OF HEAVY DRINKING: NEVER
HOW OFTEN DO YOU HAVE SIX OR MORE DRINKS ON ONE OCCASION: 1
HOW OFTEN DO YOU HAVE A DRINK CONTAINING ALCOHOL: 5
HOW MANY STANDARD DRINKS CONTAINING ALCOHOL DO YOU HAVE ON A TYPICAL DAY: 1
HAVE YOU OR SOMEONE ELSE BEEN INJURED AS A RESULT OF YOUR DRINKING: NO
HOW OFTEN DURING THE LAST YEAR HAVE YOU FAILED TO DO WHAT WAS NORMALLY EXPECTED FROM YOU BECAUSE OF DRINKING: NEVER
HOW OFTEN DURING THE LAST YEAR HAVE YOU BEEN UNABLE TO REMEMBER WHAT HAPPENED THE NIGHT BEFORE BECAUSE YOU HAD BEEN DRINKING: NEVER
HOW OFTEN DURING THE LAST YEAR HAVE YOU FAILED TO DO WHAT WAS NORMALLY EXPECTED FROM YOU BECAUSE OF DRINKING: NEVER
HOW MANY STANDARD DRINKS CONTAINING ALCOHOL DO YOU HAVE ON A TYPICAL DAY: 1 OR 2
HOW OFTEN DURING THE LAST YEAR HAVE YOU NEEDED AN ALCOHOLIC DRINK FIRST THING IN THE MORNING TO GET YOURSELF GOING AFTER A NIGHT OF HEAVY DRINKING: NEVER
HOW OFTEN DURING THE LAST YEAR HAVE YOU HAD A FEELING OF GUILT OR REMORSE AFTER DRINKING: NEVER
HAVE YOU OR SOMEONE ELSE BEEN INJURED AS A RESULT OF YOUR DRINKING: NO
HOW OFTEN DURING THE LAST YEAR HAVE YOU FOUND THAT YOU WERE NOT ABLE TO STOP DRINKING ONCE YOU HAD STARTED: NEVER
HAS A RELATIVE, FRIEND, DOCTOR, OR ANOTHER HEALTH PROFESSIONAL EXPRESSED CONCERN ABOUT YOUR DRINKING OR SUGGESTED YOU CUT DOWN: NO
HAS A RELATIVE, FRIEND, DOCTOR, OR ANOTHER HEALTH PROFESSIONAL EXPRESSED CONCERN ABOUT YOUR DRINKING OR SUGGESTED YOU CUT DOWN: NO
HOW OFTEN DURING THE LAST YEAR HAVE YOU BEEN UNABLE TO REMEMBER WHAT HAPPENED THE NIGHT BEFORE BECAUSE YOU HAD BEEN DRINKING: NEVER
HOW OFTEN DURING THE LAST YEAR HAVE YOU FOUND THAT YOU WERE NOT ABLE TO STOP DRINKING ONCE YOU HAD STARTED: NEVER

## 2025-07-07 ASSESSMENT — PATIENT HEALTH QUESTIONNAIRE - PHQ9
SUM OF ALL RESPONSES TO PHQ QUESTIONS 1-9: 0
SUM OF ALL RESPONSES TO PHQ QUESTIONS 1-9: 0
1. LITTLE INTEREST OR PLEASURE IN DOING THINGS: NOT AT ALL
SUM OF ALL RESPONSES TO PHQ QUESTIONS 1-9: 0
2. FEELING DOWN, DEPRESSED OR HOPELESS: NOT AT ALL
SUM OF ALL RESPONSES TO PHQ QUESTIONS 1-9: 0

## 2025-07-10 ENCOUNTER — OFFICE VISIT (OUTPATIENT)
Dept: FAMILY MEDICINE CLINIC | Age: 85
End: 2025-07-10

## 2025-07-10 VITALS
HEART RATE: 77 BPM | OXYGEN SATURATION: 95 % | WEIGHT: 167 LBS | RESPIRATION RATE: 18 BRPM | DIASTOLIC BLOOD PRESSURE: 60 MMHG | BODY MASS INDEX: 26.84 KG/M2 | SYSTOLIC BLOOD PRESSURE: 122 MMHG | HEIGHT: 66 IN

## 2025-07-10 DIAGNOSIS — I10 ESSENTIAL HYPERTENSION: ICD-10-CM

## 2025-07-10 DIAGNOSIS — E78.5 HYPERLIPIDEMIA LDL GOAL <100: ICD-10-CM

## 2025-07-10 DIAGNOSIS — D50.9 IRON DEFICIENCY ANEMIA, UNSPECIFIED IRON DEFICIENCY ANEMIA TYPE: ICD-10-CM

## 2025-07-10 DIAGNOSIS — Z00.00 MEDICARE ANNUAL WELLNESS VISIT, SUBSEQUENT: Primary | ICD-10-CM

## 2025-07-10 DIAGNOSIS — E11.9 TYPE 2 DIABETES MELLITUS WITHOUT COMPLICATION, WITHOUT LONG-TERM CURRENT USE OF INSULIN (HCC): ICD-10-CM

## 2025-07-10 DIAGNOSIS — H90.0 CONDUCTIVE HEARING LOSS, BILATERAL: ICD-10-CM

## 2025-07-10 RX ORDER — PSYLLIUM HUSK 0.4 G
CAPSULE ORAL
COMMUNITY

## 2025-07-10 NOTE — PROGRESS NOTES
Medicare Annual Wellness Visit     Assessment and Plan:      Diagnosis Orders   1. Medicare annual wellness visit, subsequent        2. Type 2 diabetes mellitus without complication, without long-term current use of insulin (HCC)  Comprehensive Metabolic Panel    Lipid Panel    Hemoglobin A1C      3. Essential hypertension  Comprehensive Metabolic Panel      4. Hyperlipidemia LDL goal <100  Comprehensive Metabolic Panel    Lipid Panel      5. Iron deficiency anemia, unspecified iron deficiency anemia type  CBC with Auto Differential    Ferritin    Iron and TIBC      6. Conductive hearing loss, bilateral          Plan as above and below.    FYI: While Medicare provides you with a FREE ANNUAL PREVENTIVE PHYSICAL, this visit does NOT include management of chronic medical problems or physical examination.  Dr. Vazquez usually does a combination visit if you have other medical problems so you don't have to come back for another visit.  However, this means that there will be a co-pay.    INSTRUCTIONS  NEXT APPOINTMENT: Please schedule check-up in 4 months     PLEASE TAKE THIS FORM TO CHECK-OUT WINDOW TO SCHEDULE NEXT VISIT.  PLEASE GET FASTING BLOODWORK DRAWN SOON.  Lab is on first floor in [address]. Hours Monday to Friday 6:30 AM to 5 PM.    Please get annual flu and covid vaccine when available in fall.  Can get at stores such as [employer].  May be able to stop iron if blood work good. Consider restarting if donating blood.     Subjective:   Name: Aysha Mueller  YOB: 1940  Age: 85 y.o.  Sex: female  MRN: [id number]     Date of Service:  7/10/2025    Chief Complaint:   Aysha Mueller is a 85 y.o. female who presents for Medicare Annual Wellness Visit and check-up for:  1. Medicare annual wellness visit, subsequent    2. Type 2 diabetes mellitus without complication, without long-term current use of insulin (HCC)    3. Essential hypertension    4. Hyperlipidemia LDL goal <100    5. Iron

## 2025-07-10 NOTE — PATIENT INSTRUCTIONS
instruction, always ask your healthcare professional. [email or web address], [facility], disclaims any warranty or liability for your use of this information.    Personalized Preventive Plan for Aysha Mueller - 7/10/2025  Medicare offers a range of preventive health benefits. Some of the tests and screenings are paid in full while other may be subject to a deductible, co-insurance, and/or copay.  Some of these benefits include a comprehensive review of your medical history including lifestyle, illnesses that may run in your family, and various assessments and screenings as appropriate.  After reviewing your medical record and screening and assessments performed today your provider may have ordered immunizations, labs, imaging, and/or referrals for you.  A list of these orders (if applicable) as well as your Preventive Care list are included within your After Visit Summary for your review.

## 2025-07-11 DIAGNOSIS — I10 ESSENTIAL HYPERTENSION: ICD-10-CM

## 2025-07-11 DIAGNOSIS — E78.5 HYPERLIPIDEMIA LDL GOAL <100: ICD-10-CM

## 2025-07-11 DIAGNOSIS — D50.9 IRON DEFICIENCY ANEMIA, UNSPECIFIED IRON DEFICIENCY ANEMIA TYPE: ICD-10-CM

## 2025-07-11 DIAGNOSIS — E11.9 TYPE 2 DIABETES MELLITUS WITHOUT COMPLICATION, WITHOUT LONG-TERM CURRENT USE OF INSULIN (HCC): ICD-10-CM

## 2025-07-11 LAB
ALBUMIN SERPL-MCNC: 4.2 G/DL (ref 3.4–5)
ALBUMIN/GLOB SERPL: 2.2 {RATIO} (ref 1.1–2.2)
ALP SERPL-CCNC: 124 U/L (ref 40–129)
ALT SERPL-CCNC: 21 U/L (ref 10–40)
ANION GAP SERPL CALCULATED.3IONS-SCNC: 10 MMOL/L (ref 3–16)
AST SERPL-CCNC: 18 U/L (ref 15–37)
BASOPHILS # BLD: 0 K/UL (ref 0–0.2)
BASOPHILS NFR BLD: 0.5 %
BILIRUB SERPL-MCNC: 1.1 MG/DL (ref 0–1)
BUN SERPL-MCNC: 22 MG/DL (ref 7–20)
CALCIUM SERPL-MCNC: 9.9 MG/DL (ref 8.3–10.6)
CHLORIDE SERPL-SCNC: 105 MMOL/L (ref 99–110)
CHOLEST SERPL-MCNC: 152 MG/DL (ref 0–199)
CO2 SERPL-SCNC: 27 MMOL/L (ref 21–32)
CREAT SERPL-MCNC: 0.7 MG/DL (ref 0.6–1.2)
DEPRECATED RDW RBC AUTO: 13.9 % (ref 12.4–15.4)
EOSINOPHIL # BLD: 0.2 K/UL (ref 0–0.6)
EOSINOPHIL NFR BLD: 3.9 %
FERRITIN SERPL IA-MCNC: 82.6 NG/ML (ref 15–150)
GFR SERPLBLD CREATININE-BSD FMLA CKD-EPI: 84 ML/MIN/{1.73_M2}
GLUCOSE SERPL-MCNC: 154 MG/DL (ref 70–99)
HCT VFR BLD AUTO: 40.1 % (ref 36–48)
HDLC SERPL-MCNC: 74 MG/DL (ref 40–60)
HGB BLD-MCNC: 13.7 G/DL (ref 12–16)
IRON SATN MFR SERPL: 25 % (ref 15–50)
IRON SERPL-MCNC: 74 UG/DL (ref 37–145)
LDLC SERPL CALC-MCNC: 61 MG/DL
LYMPHOCYTES # BLD: 0.9 K/UL (ref 1–5.1)
LYMPHOCYTES NFR BLD: 17.4 %
MCH RBC QN AUTO: 30.8 PG (ref 26–34)
MCHC RBC AUTO-ENTMCNC: 34.1 G/DL (ref 31–36)
MCV RBC AUTO: 90.3 FL (ref 80–100)
MONOCYTES # BLD: 0.4 K/UL (ref 0–1.3)
MONOCYTES NFR BLD: 7 %
NEUTROPHILS # BLD: 3.9 K/UL (ref 1.7–7.7)
NEUTROPHILS NFR BLD: 71.2 %
PLATELET # BLD AUTO: 225 K/UL (ref 135–450)
PMV BLD AUTO: 8.4 FL (ref 5–10.5)
POTASSIUM SERPL-SCNC: 4.5 MMOL/L (ref 3.5–5.1)
PROT SERPL-MCNC: 6.1 G/DL (ref 6.4–8.2)
RBC # BLD AUTO: 4.44 M/UL (ref 4–5.2)
SODIUM SERPL-SCNC: 142 MMOL/L (ref 136–145)
TIBC SERPL-MCNC: 302 UG/DL (ref 260–445)
TRIGL SERPL-MCNC: 85 MG/DL (ref 0–150)
VLDLC SERPL CALC-MCNC: 17 MG/DL
WBC # BLD AUTO: 5.4 K/UL (ref 4–11)

## 2025-07-12 LAB
EST. AVERAGE GLUCOSE BLD GHB EST-MCNC: 171.4 MG/DL
HBA1C MFR BLD: 7.6 %

## 2025-08-05 ENCOUNTER — OFFICE VISIT (OUTPATIENT)
Dept: FAMILY MEDICINE CLINIC | Age: 85
End: 2025-08-05

## 2025-08-05 VITALS
BODY MASS INDEX: 26.63 KG/M2 | RESPIRATION RATE: 14 BRPM | WEIGHT: 165 LBS | OXYGEN SATURATION: 93 % | DIASTOLIC BLOOD PRESSURE: 78 MMHG | TEMPERATURE: 99.5 F | HEART RATE: 88 BPM | SYSTOLIC BLOOD PRESSURE: 122 MMHG

## 2025-08-05 DIAGNOSIS — R50.9 FEVER, UNSPECIFIED FEVER CAUSE: Primary | ICD-10-CM

## 2025-08-05 DIAGNOSIS — R10.30 LOWER ABDOMINAL PAIN: ICD-10-CM

## 2025-08-05 LAB
BILIRUBIN, POC: ABNORMAL
BLOOD URINE, POC: ABNORMAL
CLARITY, POC: ABNORMAL
COLOR, POC: YELLOW
GLUCOSE URINE, POC: ABNORMAL MG/DL
KETONES, POC: ABNORMAL MG/DL
LEUKOCYTE EST, POC: ABNORMAL
NITRITE, POC: ABNORMAL
PH, POC: 5.5
PROTEIN, POC: ABNORMAL MG/DL
SPECIFIC GRAVITY, POC: 1.02
UROBILINOGEN, POC: 0.2 MG/DL

## 2025-08-07 LAB
BACTERIA UR CULT: ABNORMAL
ORGANISM: ABNORMAL

## 2025-08-28 DIAGNOSIS — I10 ESSENTIAL HYPERTENSION: ICD-10-CM

## 2025-08-28 RX ORDER — LOSARTAN POTASSIUM 25 MG/1
25 TABLET ORAL DAILY
Qty: 90 TABLET | Refills: 0 | Status: SHIPPED | OUTPATIENT
Start: 2025-08-28 | End: 2026-02-24

## (undated) DEVICE — TUBING, SUCTION, 1/4" X 12', STRAIGHT: Brand: MEDLINE

## (undated) DEVICE — PODIATRY: Brand: MEDLINE INDUSTRIES, INC.

## (undated) DEVICE — PREMIUM DRY TRAY LF: Brand: MEDLINE INDUSTRIES, INC.

## (undated) DEVICE — PADDING UNDERCAST W4INXL4YD 100% COT CRIMPED FINISH WBRL II

## (undated) DEVICE — SOLUTION SCRB 4OZ 10% POVIDONE IOD ANTIMIC BTL

## (undated) DEVICE — GLOVE SURG SZ 75 CRM LTX FREE POLYISOPRENE POLYMER BEAD ANTI

## (undated) DEVICE — MASTISOL ADHESIVE LIQ 2/3ML

## (undated) DEVICE — SOLUTION IV IRRIG POUR BRL 0.9% SODIUM CHL 2F7124

## (undated) DEVICE — GOWN SIRUS NONREIN XL W/TWL: Brand: MEDLINE INDUSTRIES, INC.

## (undated) DEVICE — PENCIL ES L3M BTTN SWCH S STL HEX LOK BLDE ELECTRD HOLSTER

## (undated) DEVICE — DRESSING PETRO W3XL3IN OIL EMUL N ADH GZ KNIT IMPREG CELOS

## (undated) DEVICE — STATION ISOLATN W1775XH205IN D675IN ICU WALL OR GLS MT P2

## (undated) DEVICE — ELECTRODE PT RET AD L9FT HI MOIST COND ADH HYDRGEL CORDED

## (undated) DEVICE — TOWEL,OR,DSP,ST,BLUE,STD,4/PK,20PK/CS: Brand: MEDLINE

## (undated) DEVICE — BANDAGE GZ W2XL75IN ST RAYON POLY CNFRM STRTCH LTWT